# Patient Record
Sex: FEMALE | Race: WHITE | NOT HISPANIC OR LATINO | Employment: FULL TIME | URBAN - NONMETROPOLITAN AREA
[De-identification: names, ages, dates, MRNs, and addresses within clinical notes are randomized per-mention and may not be internally consistent; named-entity substitution may affect disease eponyms.]

---

## 2017-02-08 ENCOUNTER — COMMUNICATION - GICH (OUTPATIENT)
Dept: FAMILY MEDICINE | Facility: OTHER | Age: 37
End: 2017-02-08

## 2017-02-08 DIAGNOSIS — Z30.9 ENCOUNTER FOR CONTRACEPTIVE MANAGEMENT: ICD-10-CM

## 2017-03-05 ENCOUNTER — COMMUNICATION - GICH (OUTPATIENT)
Dept: FAMILY MEDICINE | Facility: OTHER | Age: 37
End: 2017-03-05

## 2017-05-22 ENCOUNTER — OFFICE VISIT - GICH (OUTPATIENT)
Dept: FAMILY MEDICINE | Facility: OTHER | Age: 37
End: 2017-05-22

## 2017-05-22 ENCOUNTER — HISTORY (OUTPATIENT)
Dept: FAMILY MEDICINE | Facility: OTHER | Age: 37
End: 2017-05-22

## 2017-05-22 DIAGNOSIS — L71.0 PERIORAL DERMATITIS: ICD-10-CM

## 2017-05-22 DIAGNOSIS — Z30.09 ENCOUNTER FOR OTHER GENERAL COUNSELING AND ADVICE ON CONTRACEPTION: ICD-10-CM

## 2017-05-22 DIAGNOSIS — L81.1 CHLOASMA: ICD-10-CM

## 2017-06-05 ENCOUNTER — AMBULATORY - GICH (OUTPATIENT)
Dept: SCHEDULING | Facility: OTHER | Age: 37
End: 2017-06-05

## 2017-06-08 ENCOUNTER — AMBULATORY - GICH (OUTPATIENT)
Dept: SCHEDULING | Facility: OTHER | Age: 37
End: 2017-06-08

## 2018-01-03 NOTE — TELEPHONE ENCOUNTER
Patient Information     Patient Name MRN Christine Patel [id number] Female 1980      Telephone Encounter by Erika Bales RN at 3/6/2017  2:16 PM     Author:  Erika Bales RN Service:  (none) Author Type:  (none)     Filed:  3/6/2017  2:32 PM Encounter Date:  3/5/2017 Status:  Signed     :  Erika Bales RN (NURS- Registered Nurse)            Hormones    Office visit in the past 12 months or per provider note.    Last visit with OCHOA MACIAS was on: 2016 in [facility]  Next visit with OCHOA MACIAS is on: No future appointment listed with this provider  Next visit with Family Practice is on: No future appointment listed in this department    Max refill for 12 months from last office visit or per provider note.    Patient's PCP is Austin Kerr MD. Last office visit: 2013.    Patient is due for medication management appointment. Limited refill provided at this time and letter sent for reminder to patient. Prescription refilled per RN Medication Refill Policy.................... Erika Bales ....................  3/6/2017   2:17 PM

## 2018-01-03 NOTE — TELEPHONE ENCOUNTER
Patient Information     Patient Name MRN Christine Patel [id number] Female 1980      Telephone Encounter by Andreina Dunn RN at 2017  9:31 AM     Author:  Andreina Dunn RN Service:  (none) Author Type:  NURS- Registered Nurse     Filed:  2017  9:33 AM Encounter Date:  2017 Status:  Signed     :  Andreina Dunn RN (NURS- Registered Nurse)            Refill request inappropriate. Too soon.Filled 3/29/16 84 x 3 refills. Pharmacy alerted.Unable to complete prescription refill per RN Medication Refill Policy.................... Andreina Dunn RN ....................  2017   9:33 AM    2016  Ordered by: OCHOA MACIAS  Medication:norgestrel-ethinyl estradiol, 0.3-30 mg-mcg, (LO-OVRAL) 0.3-30              mg-mcg tablet    Qty:84 tablet   Ref:3  Start:3/29/2016   End:              Route:Oral                Class:eRx    Sig:Take 1 tablet by mouth once daily.    Pharmacy:[facility] [id number] [place] [address]

## 2018-01-05 NOTE — NURSING NOTE
Patient Information     Patient Name MRN Sex Christine Cheung [id number] Female 1980      Nursing Note by Ventura Santos LPN at 2017  8:45 AM     Author:  Ventura Santos LPN  Service:  (none) Author Type:  NURS- Licensed Practical Nurse     Filed:  2017  8:48 AM  Encounter Date:  2017 Status:  Addendum     :  Ventura Santos LPN (NURS- Licensed Practical Nurse)        Related Notes: Original Note by Ventura Santos LPN (NURS- Licensed Practical Nurse) filed at 2017  8:45 AM            Patient presents to the clinic for questions and options regarding birth control. Patient has been having issues on breaking out and wants to talk about that as well.  Ventura Santos LPN ..............2017 8:45 AM

## 2018-01-05 NOTE — PROGRESS NOTES
Patient Information     Patient Name MRN Christine Patel [id number] Female 1980      Progress Notes by Baylee Lyle MD at 2017  8:45 AM     Author:  Baylee Lyle MD Service:  (none) Author Type:  Physician     Filed:  2017 12:56 PM Encounter Date:  2017 Status:  Signed     :  Baylee Lyle MD (Physician)            SUBJECTIVE:    Christine Moncada is a 37 y.o. female who presents for birth control discussion.  Since her last baby was born, she has a lot of skin issues.  Has red, blothy, itchy area.  Started near the left side of her mouth.  Gets bumpy and itchy, then gets peely.  She also has some in the left nasolabial fold area and now some right under her right lateral lower eyelid.  Also has some redness near her nose.  Mom has rosacea and wonders if this is the cause of this area.  This seems different from the other rash.  They don't seem like pimples.  These are just bumps and are painful and irritated.      Since her last son was born about a year and a half ago, she has also noted some hyperpigmentation of the area of her upper lip.  She has done some reading and knows that this is called melasma. She is wondering if the oral contraceptives she takes have been contributing to this.  She didn't notice this during her pregnancy, only in the months following.    She has had a difficult time tolerating oral contraceptives.  She has done well with Depo-Provera in the past. However, they are thinking they want to have one more child. She did have trouble restarting her periods after having been on Depo-Provera for a prolonged period of time in the past. She is currently on low overall. She had been on a higher dosage oral birth control pill the past.  She is now on lo ovral. In the past, she had problems feeling nauseous with the higher dose of birth-control pills. Now, she's been developing the same feeling of nausea in the mornings with the lo ovral as  well.    HPI  I personally reviewed medications/allergies/history listed below:     Allergies      Allergen   Reactions     Amoxicillin  Rash     Pollen Extracts  Runny Nose     Sneezy, itchy eyes    ,   Family History       Problem   Relation Age of Onset     Other  Mother       due to brother's large size     ,   Current Outpatient Prescriptions on File Prior to Visit       Medication  Sig Dispense Refill     ACETAMINOPHEN (TYLENOL EX STR RAPID RELEASE ORAL) Take 650 mg by mouth.       ibuprofen (ADVIL; MOTRIN) 600 mg tablet Take 1 tablet by mouth every 6 hours if needed for Pain (This medication is first choice for mild pain.). Maximum of 3200 mg in 24 hours.  0     No current facility-administered medications on file prior to visit.    ,   Past Medical History:     Diagnosis  Date     Depression     remote hx      Hx of migraines     abdominal, quiescent      Labral tear of the left hip 2014      (normal spontaneous vaginal delivery)     , VAVD x2      SCOLIOSIS, MILD     in childhood     ,   Patient Active Problem List     Diagnosis  Code     CONTRACEPTIVE MANAGEMENT Z30.09     OTHER MALAISE AND FATIGUE R53.81, R53.83     DEPRESSION, HX OF Z86.59     Dyspareunia N94.1     Labral tear of the left hip M25.9     Positive GBS test B95.1     Normal labor O80, Z37.9    and   Past Surgical History:      Procedure  Laterality Date     left hip labral repair       Social History     Social History        Marital status:       Spouse name: Reyes     Number of children:  2     Years of education:  N/A     Occupational History        [employer]     Social History Main Topics         Smoking status:   Passive Smoke Exposure - Never Smoker     Smokeless tobacco:   Never Used      Comment: In childhood home      Alcohol use   No     Drug use:   No     Sexual activity:   Yes     Partners:  Male     Other Topics  Concern     Not on file      Social History Narrative      ". in August 2005.  Masters Degree from the [employer].  .  Her  his a .    They have 2 sons.     - Reyes    Son - Balwinder    Son - Manpreet            REVIEW OF SYSTEMS:  Review of Systems   All other systems reviewed and are negative.      OBJECTIVE:  /80  Pulse 68  Ht 1.651 m (5' 5\")  Wt 74.4 kg (164 lb)  LMP 04/22/2017  Breastfeeding? No  BMI 27.29 kg/m2    EXAM:   Physical Exam   Constitutional: She is well-developed, well-nourished, and in no distress.   Eyes: Pupils are equal, round, and reactive to light.   Skin:   Result reddened appearance of her upper cheeks with some small papules within this area. There are other clusters of papules around her mouth, chin, nasal labial area. There is also a smaller cluster underneath the lateral side of her right eye. There are also some scattered erythematous papules on her forehead. There are no pustules within knees.    Hyperpigmentation of the area above her upper lip.   Psychiatric: Affect normal.   PHQ Depression Screen  Date of PHQ exam: 05/22/17  Over the last 2 weeks, how often have you been bothered by any of the following problems?  1. Little interest or pleasure in doing things: 0 - Not at all  2. Feeling down, depressed, or hopeless: 0 - Not at all                                           ASSESSMENT/PLAN:    ICD-10-CM    1. Perioral dermatitis L71.0 metroNIDAZOLE (METROCREAM) 0.75 % cream      AMB CONSULT TO DERMATOLOGY   2. Melasma L81.1 AMB CONSULT TO DERMATOLOGY   3. CONTRACEPTIVE MANAGEMENT Z30.09 levonorgestrel-ethinyl estrad, 0.1mg-20mcg, (AVIANE) 0.1-20 mg-mcg tablet        Plan:    1.  I think she has 2 things going on here. I think she does have some rosacea developing over her cheeks. She feels that this is different than the other rash she has. I think the other rash is probably perioral dermatitis. She states that she has tried metronidazole " cream in the past which she didn't really think was all that effective. We discussed that usually the next step would be an oral antibiotic, which she does not want to do at this time. We'll have her try the metronidazole 0.75% cream again at this time. Also will refer to dermatology so that if the metronidazole cream is not effective they might be able to discuss other options for treatment.  2. We'll try switching oral antibiotic to a lower estrogen dosage to see if this will help reduce the melasma she has at all. She already is trying to avoid sun exposure. Further discussion with dermatology as well.  3. As above, will decrease the dosage of her oral contraceptive and switched to Aviane.  Follow-up as needed.  Baylee Lyle MD

## 2018-01-27 VITALS
WEIGHT: 164 LBS | HEIGHT: 65 IN | DIASTOLIC BLOOD PRESSURE: 80 MMHG | HEART RATE: 68 BPM | BODY MASS INDEX: 27.32 KG/M2 | SYSTOLIC BLOOD PRESSURE: 122 MMHG

## 2018-01-31 ENCOUNTER — DOCUMENTATION ONLY (OUTPATIENT)
Dept: FAMILY MEDICINE | Facility: OTHER | Age: 38
End: 2018-01-31

## 2018-01-31 PROBLEM — Z86.59 PERSONAL HISTORY OF OTHER MENTAL DISORDER: Status: ACTIVE | Noted: 2018-01-31

## 2018-01-31 RX ORDER — ACETAMINOPHEN 500 MG
650 TABLET ORAL
COMMUNITY
End: 2018-06-05

## 2018-01-31 RX ORDER — LEVONORGESTREL/ETHIN.ESTRADIOL 0.1-0.02MG
1 TABLET ORAL DAILY
COMMUNITY
Start: 2017-05-22 | End: 2018-03-07

## 2018-01-31 RX ORDER — IBUPROFEN 600 MG/1
600 TABLET, FILM COATED ORAL EVERY 6 HOURS PRN
COMMUNITY
Start: 2016-02-08 | End: 2018-03-07

## 2018-03-07 ENCOUNTER — OFFICE VISIT (OUTPATIENT)
Dept: FAMILY MEDICINE | Facility: OTHER | Age: 38
End: 2018-03-07
Attending: FAMILY MEDICINE
Payer: COMMERCIAL

## 2018-03-07 VITALS
HEART RATE: 72 BPM | SYSTOLIC BLOOD PRESSURE: 100 MMHG | WEIGHT: 157.8 LBS | DIASTOLIC BLOOD PRESSURE: 70 MMHG | TEMPERATURE: 97.2 F | BODY MASS INDEX: 26.26 KG/M2

## 2018-03-07 DIAGNOSIS — N91.2 AMENORRHEA: Primary | ICD-10-CM

## 2018-03-07 LAB
B-HCG SERPL-ACNC: NORMAL IU/L
HCG UR QL: POSITIVE

## 2018-03-07 PROCEDURE — 81025 URINE PREGNANCY TEST: CPT | Performed by: NURSE PRACTITIONER

## 2018-03-07 PROCEDURE — 99213 OFFICE O/P EST LOW 20 MIN: CPT | Performed by: NURSE PRACTITIONER

## 2018-03-07 PROCEDURE — 84702 CHORIONIC GONADOTROPIN TEST: CPT | Performed by: NURSE PRACTITIONER

## 2018-03-07 PROCEDURE — 36415 COLL VENOUS BLD VENIPUNCTURE: CPT | Performed by: NURSE PRACTITIONER

## 2018-03-07 ASSESSMENT — PAIN SCALES - GENERAL: PAINLEVEL: NO PAIN (0)

## 2018-03-07 NOTE — NURSING NOTE
Patient is here today and would like a pregnancy test, she thinks she may be about 5 weeks along.Tessy Laurent LPN......................3/7/2018 3:53 PM

## 2018-03-07 NOTE — MR AVS SNAPSHOT
"              After Visit Summary   3/7/2018    Damari Moncada    MRN: [id number]           Patient Information     Date Of Birth          1980        Visit Information        Provider Department      3/7/2018 3:45 PM Loulou Nelson APRN CNP [facility]        Today's Diagnoses     Amenorrhea    -  1       Follow-ups after your visit        Additional Services     OB/GYN REFERRAL       Dr Trinh                  Future tests that were ordered for you today     Open Future Orders        Priority Expected Expires Ordered    US Pelvic Complete with Transvaginal Routine  3/7/2019 3/7/2018    OB/GYN REFERRAL Routine  3/29/2018 3/7/2018            Who to contact     If you have questions or need follow up information about today's clinic visit or your schedule please contact [facility] directly at [phone].  Normal or non-critical lab and imaging results will be communicated to you by Orthogemhart, letter or phone within 4 business days after the clinic has received the results. If you do not hear from us within 7 days, please contact the clinic through Orthogemhart or phone. If you have a critical or abnormal lab result, we will notify you by phone as soon as possible.  Submit refill requests through Leapfactor or call your pharmacy and they will forward the refill request to us. Please allow 3 business days for your refill to be completed.          Additional Information About Your Visit        MyChart Information     Leapfactor lets you send messages to your doctor, view your test results, renew your prescriptions, schedule appointments and more. To sign up, go to [email or web address] . Click on \"Log in\" on the left side of the screen, which will take you to the Welcome page. Then click on \"Sign up Now\" on the right side of the page.     You will be asked to enter the access code listed below, as well as some personal information. Please follow the directions to create your " username and password.     Your access code is: [email or web address]  Expires: 2018  4:58 PM     Your access code will  in 90 days. If you need help or a new code, please call your [place] clinic or [phone].        Care EveryWhere ID     This is your Care EveryWhere ID. This could be used by other organizations to access your [place] medical records  [id number]        Your Vitals Were     Pulse Temperature Last Period Breastfeeding? BMI (Body Mass Index)       72 97.2  F (36.2  C) (Temporal) 2018 (LMP Unknown) No 26.26 kg/m2        Blood Pressure from Last 3 Encounters:   18 100/70   17 122/80   16 110/76    Weight from Last 3 Encounters:   18 71.6 kg (157 lb 12.8 oz)   17 74.4 kg (164 lb)   16 86.1 kg (189 lb 12.8 oz)              We Performed the Following     HCG Qual, Urine - CSC and Range ([id number])     HCG quantitative pregnancy          Today's Medication Changes          These changes are accurate as of 3/7/18  4:58 PM.  If you have any questions, ask your nurse or doctor.               Start taking these medicines.        Dose/Directions    Prenatal Vitamins 0.8 MG Tabs   Used for:  Amenorrhea   Started by:  Loulou Nelson APRN CNP        1 tab daily   Quantity:  90 tablet   Refills:  3            Where to get your medicines      Some of these will need a paper prescription and others can be bought over the counter.  Ask your nurse if you have questions.     Bring a paper prescription for each of these medications     Prenatal Vitamins 0.8 MG Tabs                Primary Care Provider Office Phone # Fax #    Baylee Fadia Lyle [phone] [address] [facility] [address]        Equal Access to Services     [facility] EMMA : Fabian medelo Sopaulo, waaxda luqadaha, qaybta kaalmada ghadada, gaurang carbajal. So [facility] [phone].    ATENCIÓN: Si habla español, tiene a judge disposición  servicios gratuitos de asistencia lingüística. Cory snider [phone].    We comply with applicable federal civil rights laws and [place] laws. We do not discriminate on the basis of race, color, national origin, age, disability, sex, sexual orientation, or gender identity.            Thank you!     Thank you for choosing [facility]  for your care. Our goal is always to provide you with excellent care. Hearing back from our patients is one way we can continue to improve our services. Please take a few minutes to complete the written survey that you may receive in the mail after your visit with us. Thank you!             Your Updated Medication List - Protect others around you: Learn how to safely use, store and throw away your medicines at www.disposemymeds.org.          This list is accurate as of 3/7/18  4:58 PM.  Always use your most recent med list.                   Brand Name Dispense Instructions for use Diagnosis    acetaminophen 500 MG tablet    TYLENOL     Take 650 mg by mouth        metroNIDAZOLE 0.75 % cream    METROCREAM          Prenatal Vitamins 0.8 MG Tabs     90 tablet    1 tab daily    Amenorrhea

## 2018-03-08 ASSESSMENT — PATIENT HEALTH QUESTIONNAIRE - PHQ9: SUM OF ALL RESPONSES TO PHQ QUESTIONS 1-9: 0

## 2018-03-08 NOTE — PROGRESS NOTES
"SUBJECTIVE:   Damari Moncada is a 38 year old female presenting with a chief complaint of   Chief Complaint   Patient presents with     Pregnancy Test     would like a pregnancy test   Patient had a positive home pregnancy test--- has been taking oral contraceptives and reports has never missed a dose--  Reports previous pregnancy on oral contraceptives.  Feels had a\" breakthrough period\" before 18 which was her last menstrual period date, reports has had previous monthly menstrual periods on oral contraceptive.  Feels she may be about 5 or 6 weeks duration but not really sure.  Wants to see Dr. Trinh for pregnancy, had complications with her last delivery.  Reports overall she has been feeling well, denies any nausea, vomiting, fevers or congestion  No tobacco use--wants to start prenatal vitamin            Review of Systems   Genitourinary: Positive for menstrual problem.   All other systems reviewed and are negative.      Past Medical History:   Diagnosis Date     Encounter for full-term uncomplicated delivery     , VAVD x2     Joint disorder     2014     Major depressive disorder, single episode     remote hx     Other idiopathic scoliosis, site unspecified     in childhood     Personal history of other diseases of the nervous system and sense organs     2001,abdominal, quiescent     Family History   Problem Relation Age of Onset     Other - See Comments Mother       due to brother's large size     Current Outpatient Prescriptions   Medication Sig Dispense Refill     Prenatal Multivit-Min-Fe-FA (PRENATAL VITAMINS) 0.8 MG TABS 1 tab daily 90 tablet 3     acetaminophen (TYLENOL) 500 MG tablet Take 650 mg by mouth       metroNIDAZOLE (METROCREAM) 0.75 % cream        Social History   Substance Use Topics     Smoking status: Passive Smoke Exposure - Never Smoker     Smokeless tobacco: Never Used      Comment: Quit smoking: In childhood home     Alcohol use No       OBJECTIVE  /70  " Pulse 72  Temp 97.2  F (36.2  C) (Temporal)  Wt 157 lb 12.8 oz (71.6 kg)  LMP 01/28/2018 (LMP Unknown)  Breastfeeding? No  BMI 26.26 kg/m2    Physical Exam   Constitutional: She is oriented to person, place, and time. She appears well-developed and well-nourished.   Cardiovascular: Normal rate.    Pulmonary/Chest: Effort normal.   Musculoskeletal: Normal range of motion.   Neurological: She is alert and oriented to person, place, and time.   Skin: Skin is warm and dry.   Psychiatric: She has a normal mood and affect. Her behavior is normal. Judgment and thought content normal.   Nursing note and vitals reviewed.      Labs:  Results for orders placed or performed in visit on 03/07/18 (from the past 24 hour(s))   HCG Qual, Urine - CSC and Range ([id number])   Result Value Ref Range    HCG Qual Urine Positive (A) NEG^Negative   HCG quantitative pregnancy   Result Value Ref Range    HCG Quantitative Serum 68518 IU/L           ASSESSMENT:      ICD-10-CM    1. Amenorrhea N91.2 HCG Qual, Urine - CSC and Range ([id number])     HCG quantitative pregnancy     US Pelvic Complete with Transvaginal     OB/GYN REFERRAL     Prenatal Multivit-Min-Fe-FA (PRENATAL VITAMINS) 0.8 MG TABS   Possibly 5 weeks gestation ?    Pelvic ultrasound scheduled    PLAN: Prenatal consultation with Dr. Trinh after ultrasound and labs completed      Followup: Ultrasound and gynecology consultation as scheduled

## 2018-03-19 ENCOUNTER — HOSPITAL ENCOUNTER (OUTPATIENT)
Dept: ULTRASOUND IMAGING | Facility: OTHER | Age: 38
Discharge: HOME | End: 2018-03-19
Attending: NURSE PRACTITIONER | Admitting: NURSE PRACTITIONER
Payer: COMMERCIAL

## 2018-03-19 DIAGNOSIS — N91.2 AMENORRHEA: ICD-10-CM

## 2018-03-19 PROCEDURE — 76801 OB US < 14 WKS SINGLE FETUS: CPT

## 2018-04-06 ENCOUNTER — PRENATAL OFFICE VISIT (OUTPATIENT)
Dept: OBGYN | Facility: OTHER | Age: 38
End: 2018-04-06
Attending: OBSTETRICS & GYNECOLOGY
Payer: COMMERCIAL

## 2018-04-06 VITALS
SYSTOLIC BLOOD PRESSURE: 112 MMHG | BODY MASS INDEX: 26.65 KG/M2 | DIASTOLIC BLOOD PRESSURE: 76 MMHG | HEIGHT: 66 IN | WEIGHT: 165.8 LBS | HEART RATE: 86 BPM

## 2018-04-06 DIAGNOSIS — I49.9 CARDIAC ARRHYTHMIA, UNSPECIFIED CARDIAC ARRHYTHMIA TYPE: ICD-10-CM

## 2018-04-06 DIAGNOSIS — Z34.91 NORMAL PREGNANCY IN FIRST TRIMESTER: Primary | ICD-10-CM

## 2018-04-06 DIAGNOSIS — O09.521 ELDERLY MULTIGRAVIDA IN FIRST TRIMESTER: ICD-10-CM

## 2018-04-06 LAB
ALBUMIN UR-MCNC: NEGATIVE MG/DL
ANION GAP SERPL CALCULATED.3IONS-SCNC: 7 MMOL/L (ref 3–14)
APPEARANCE UR: CLEAR
BASOPHILS # BLD AUTO: 0 10E9/L (ref 0–0.2)
BASOPHILS NFR BLD AUTO: 0.4 %
BILIRUB UR QL STRIP: NEGATIVE
BUN SERPL-MCNC: 15 MG/DL (ref 7–25)
C TRACH DNA SPEC QL PROBE+SIG AMP: NOT DETECTED
CALCIUM SERPL-MCNC: 9 MG/DL (ref 8.6–10.3)
CHLORIDE SERPL-SCNC: 101 MMOL/L (ref 98–107)
CO2 SERPL-SCNC: 26 MMOL/L (ref 21–31)
COLOR UR AUTO: YELLOW
CREAT SERPL-MCNC: 0.68 MG/DL (ref 0.6–1.2)
DIFFERENTIAL METHOD BLD: NORMAL
EOSINOPHIL # BLD AUTO: 0 10E9/L (ref 0–0.7)
EOSINOPHIL NFR BLD AUTO: 0.5 %
ERYTHROCYTE [DISTWIDTH] IN BLOOD BY AUTOMATED COUNT: 12.7 % (ref 10–15)
GFR SERPL CREATININE-BSD FRML MDRD: >90 ML/MIN/1.7M2
GLUCOSE SERPL-MCNC: 74 MG/DL (ref 70–105)
GLUCOSE UR STRIP-MCNC: NEGATIVE MG/DL
HCT VFR BLD AUTO: 37.4 % (ref 35–47)
HGB BLD-MCNC: 12.6 G/DL (ref 11.7–15.7)
HGB UR QL STRIP: NEGATIVE
IMM GRANULOCYTES # BLD: 0 10E9/L (ref 0–0.4)
IMM GRANULOCYTES NFR BLD: 0.2 %
KETONES UR STRIP-MCNC: ABNORMAL MG/DL
LEUKOCYTE ESTERASE UR QL STRIP: NEGATIVE
LYMPHOCYTES # BLD AUTO: 2.1 10E9/L (ref 0.8–5.3)
LYMPHOCYTES NFR BLD AUTO: 25.1 %
MAGNESIUM SERPL-MCNC: 2 MG/DL (ref 1.9–2.7)
MCH RBC QN AUTO: 29.6 PG (ref 26.5–33)
MCHC RBC AUTO-ENTMCNC: 33.7 G/DL (ref 31.5–36.5)
MCV RBC AUTO: 88 FL (ref 78–100)
MONOCYTES # BLD AUTO: 0.5 10E9/L (ref 0–1.3)
MONOCYTES NFR BLD AUTO: 6.5 %
N GONORRHOEA DNA SPEC QL PROBE+SIG AMP: NOT DETECTED
NEUTROPHILS # BLD AUTO: 5.5 10E9/L (ref 1.6–8.3)
NEUTROPHILS NFR BLD AUTO: 67.3 %
NITRATE UR QL: NEGATIVE
PH UR STRIP: 6.5 PH (ref 5–7)
PLATELET # BLD AUTO: 302 10E9/L (ref 150–450)
POTASSIUM SERPL-SCNC: 3.5 MMOL/L (ref 3.5–5.1)
RBC # BLD AUTO: 4.25 10E12/L (ref 3.8–5.2)
SODIUM SERPL-SCNC: 134 MMOL/L (ref 134–144)
SOURCE: ABNORMAL
SP GR UR STRIP: 1.01 (ref 1–1.03)
SPECIMEN SOURCE: NORMAL
TSH SERPL DL<=0.05 MIU/L-ACNC: 0.6 IU/ML (ref 0.34–5.6)
UROBILINOGEN UR STRIP-ACNC: 0.2 EU/DL (ref 0.2–1)
WBC # BLD AUTO: 8.2 10E9/L (ref 4–11)

## 2018-04-06 PROCEDURE — 81003 URINALYSIS AUTO W/O SCOPE: CPT | Performed by: OBSTETRICS & GYNECOLOGY

## 2018-04-06 PROCEDURE — 36415 COLL VENOUS BLD VENIPUNCTURE: CPT | Performed by: OBSTETRICS & GYNECOLOGY

## 2018-04-06 PROCEDURE — 83735 ASSAY OF MAGNESIUM: CPT | Performed by: OBSTETRICS & GYNECOLOGY

## 2018-04-06 PROCEDURE — 85025 COMPLETE CBC W/AUTO DIFF WBC: CPT | Performed by: OBSTETRICS & GYNECOLOGY

## 2018-04-06 PROCEDURE — 99207 ZZC OB VISIT-NO CHARGE - GICH ONLY: CPT | Performed by: OBSTETRICS & GYNECOLOGY

## 2018-04-06 PROCEDURE — 87389 HIV-1 AG W/HIV-1&-2 AB AG IA: CPT | Performed by: OBSTETRICS & GYNECOLOGY

## 2018-04-06 PROCEDURE — 93000 ELECTROCARDIOGRAM COMPLETE: CPT | Performed by: INTERNAL MEDICINE

## 2018-04-06 PROCEDURE — 86901 BLOOD TYPING SEROLOGIC RH(D): CPT | Performed by: OBSTETRICS & GYNECOLOGY

## 2018-04-06 PROCEDURE — 86762 RUBELLA ANTIBODY: CPT | Performed by: OBSTETRICS & GYNECOLOGY

## 2018-04-06 PROCEDURE — 86900 BLOOD TYPING SEROLOGIC ABO: CPT | Performed by: OBSTETRICS & GYNECOLOGY

## 2018-04-06 PROCEDURE — 87591 N.GONORRHOEAE DNA AMP PROB: CPT | Performed by: OBSTETRICS & GYNECOLOGY

## 2018-04-06 PROCEDURE — 87086 URINE CULTURE/COLONY COUNT: CPT | Performed by: OBSTETRICS & GYNECOLOGY

## 2018-04-06 PROCEDURE — 80048 BASIC METABOLIC PNL TOTAL CA: CPT | Performed by: OBSTETRICS & GYNECOLOGY

## 2018-04-06 PROCEDURE — 86850 RBC ANTIBODY SCREEN: CPT | Performed by: OBSTETRICS & GYNECOLOGY

## 2018-04-06 PROCEDURE — 86780 TREPONEMA PALLIDUM: CPT | Performed by: OBSTETRICS & GYNECOLOGY

## 2018-04-06 PROCEDURE — 87491 CHLMYD TRACH DNA AMP PROBE: CPT | Performed by: OBSTETRICS & GYNECOLOGY

## 2018-04-06 PROCEDURE — 87340 HEPATITIS B SURFACE AG IA: CPT | Performed by: OBSTETRICS & GYNECOLOGY

## 2018-04-06 PROCEDURE — 84443 ASSAY THYROID STIM HORMONE: CPT | Performed by: OBSTETRICS & GYNECOLOGY

## 2018-04-06 ASSESSMENT — PAIN SCALES - GENERAL: PAINLEVEL: NO PAIN (0)

## 2018-04-06 NOTE — PROGRESS NOTES
CC: New OB visit  HPI:  Damari Moncada is  at Unknown based on No LMP recorded (lmp unknown). Patient is pregnant./first trimester US 3/19/18 at 8 weeks 0 days with EDC of 10/29/18.  She notes issues of nausea, sore breasts, fatigue. She exercises regularly. She has history of labrum surgery in her hip without hardware. She has history of a seemingly benign arrhythmia.    Obstetric History       T2      L2     SAB0   TAB0   Ectopic0   Multiple0   Live Births0       # Outcome Date GA Lbr Yao/2nd Weight Sex Delivery Anes PTL Lv   1 Current                 STI: (denies HSV, Hep C, Hep B, HIV, Syphilis, Chlamydia, Gonorrhea)  Last pap smear: Normal and up to date.  Chickenpox history: has had the chicken pox.  Past Medical History:   Diagnosis Date     Encounter for full-term uncomplicated delivery     , VAVD x2     Joint disorder     2014     Major depressive disorder, single episode     remote hx     Other idiopathic scoliosis, site unspecified     in childhood     Personal history of other diseases of the nervous system and sense organs     ,abdominal, quiescent      has a past surgical history that includes other surgical history.    Social History   Substance Use Topics     Smoking status: Passive Smoke Exposure - Never Smoker     Smokeless tobacco: Never Used      Comment: Quit smoking: In childhood home     Alcohol use No     Family History   Problem Relation Age of Onset     Other - See Comments Mother       due to brother's large size         Current Outpatient Prescriptions   Medication     Prenatal Multivit-Min-Fe-FA (PRENATAL VITAMINS) 0.8 MG TABS     acetaminophen (TYLENOL) 500 MG tablet     metroNIDAZOLE (METROCREAM) 0.75 % cream     No current facility-administered medications for this visit.      Allergies   Allergen Reactions     Pollen Extract      Other reaction(s): Runny Nose  Sneezy, itchy eyes     Amoxicillin Rash     Immunization History  "  Administered Date(s) Administered     W4s3-19 Novel Flu 11/05/2009           REVIEW OF SYSTEMS  General: negative  ENT: negative  Resp: No shortness of breath, dyspnea on exertion, cough, or hemoptysis  GI: nausea  : negative  Musculoskeletal: negative  Psychiatric: feeling anxious, hormonal  Hematologic: negative, postpartum hemorrhage    EXAM: /76 (BP Location: Right arm, Patient Position: Chair, Cuff Size: Adult Regular)  Pulse 86  Ht 1.676 m (5' 6\")  Wt 75.2 kg (165 lb 12.8 oz)  LMP  (LMP Unknown)  Breastfeeding? No  BMI 26.76 kg/m2  Gen: NAD  CV: rhythm is irregularly irregular, slow rate, about 60  Resp: CTA Bilaterally  Breasts: normal without suspicious masses, skin changes or axillary nodes.  Abdomen: NT, ND  Pelvic exam: normal vagina and vulva, normal cervix without lesions or tenderness, exam chaperoned by nurse. G/C collected.  Extremities: No TTP, no deformity  Neuro: CN II-XII intact grossly, moves all extremities  Psych: normal affect and mentation.  Bedside US shows a viable IUP at 10w4d with normal heart rate, fluid, normal ovaries and uterus.    I/P    (O09.521) Elderly multigravida in first trimester  Comment:   Plan: CBC with platelets differential- OB PANEL, ABO         and Rh- OB PANEL, Anti Treponema- OB PANEL,         Antibody screen red cell- OB PANEL, Hepatitis B        surface antigen OB PANEL, Rubella Antibody IgG         Quantitative- OB PANEL, Urine Culture Aerobic         Bacterial- OB PANEL, *UA reflex to Microscopic-        OB PANEL, GC/Chlamydia by PCR - HI,GH, HIV         Antigen Antibody Combo            (I49.9) Cardiac arrhythmia, unspecified cardiac arrhythmia type  Comment:   Plan: EKG 12-lead, tracing only, TSH GH, Basic         metabolic panel  (Ca, Cl, CO2, Creat, Gluc, K,         Na, BUN)          Discussed safety, nutrition, screening for cystic fibrosis, spina bifida, spinal muscular atrophy, quad screen, cffDNA screening as appropriate.  F/U scheduled, " discussed call schedule rotation with [facility] and Dr. Sanchez, general surgery.  Return visit in 1 month, pending EKG.    Pregnancy risk factors include: AMA, History of PPH, vacuum delivery x 2, cardiac dysrhythmia.    Bertrand Johnson MD FACOG  4:06 PM 4/6/2018

## 2018-04-06 NOTE — MR AVS SNAPSHOT
"              After Visit Summary   4/6/2018    Damari Moncada    MRN: [id number]           Patient Information     Date Of Birth          1980        Visit Information        Provider Department      4/6/2018 3:45 PM Bertrand Johnson MD [facility]        Today's Diagnoses     Normal pregnancy in first trimester    -  1    Elderly multigravida in first trimester        Cardiac arrhythmia, unspecified cardiac arrhythmia type          Care Instructions    Unisom at night, vitamin B6 25 mg three times a day for nausea and sleep.  Prenatal vitamins, extra vitamin D.            Follow-ups after your visit        Follow-up notes from your care team     Return in about 4 weeks (around 5/4/2018).      Who to contact     If you have questions or need follow up information about today's clinic visit or your schedule please contact [facility] directly at [phone].  Normal or non-critical lab and imaging results will be communicated to you by Simplisthart, letter or phone within 4 business days after the clinic has received the results. If you do not hear from us within 7 days, please contact the clinic through Simplisthart or phone. If you have a critical or abnormal lab result, we will notify you by phone as soon as possible.  Submit refill requests through [employer] or call your pharmacy and they will forward the refill request to us. Please allow 3 business days for your refill to be completed.          Additional Information About Your Visit        MyChart Information     [employer] lets you send messages to your doctor, view your test results, renew your prescriptions, schedule appointments and more. To sign up, go to [email or web address] . Click on \"Log in\" on the left side of the screen, which will take you to the Welcome page. Then click on \"Sign up Now\" on the right side of the page.     You will be asked to enter the access code listed below, as well as some personal " "information. Please follow the directions to create your username and password.     Your access code is: [email or web address]  Expires: 2018  5:58 PM     Your access code will  in 90 days. If you need help or a new code, please call your [place] clinic or [phone].        Care EveryWhere ID     This is your Care EveryWhere ID. This could be used by other organizations to access your [place] medical records  [id number]        Your Vitals Were     Pulse Height Last Period Breastfeeding? BMI (Body Mass Index)       86 1.676 m (5' 6\") (LMP Unknown) No 26.76 kg/m2        Blood Pressure from Last 3 Encounters:   18 112/76   18 100/70   17 122/80    Weight from Last 3 Encounters:   18 75.2 kg (165 lb 12.8 oz)   18 71.6 kg (157 lb 12.8 oz)   17 74.4 kg (164 lb)              We Performed the Following     *UA reflex to Microscopic- OB PANEL     ABO and Rh- OB PANEL     Anti Treponema- OB PANEL     Antibody screen red cell- OB PANEL     Basic metabolic panel  (Ca, Cl, CO2, Creat, Gluc, K, Na, BUN)     CBC with platelets differential- OB PANEL     EKG 12-lead, tracing only     GC/Chlamydia by PCR - HI,GH     Hepatitis B surface antigen OB PANEL     HIV Antigen Antibody Combo     Magnesium     Rubella Antibody IgG Quantitative- OB PANEL     TSH GH     Urine Culture Aerobic Bacterial- OB PANEL        Primary Care Provider Office Phone # Fax #    Sxcy Fadia Lyle [phone]       [address] [facility] [address]        Equal Access to Services     [facility] : Hadii milton Warner, waaxda luqadaha, qaybta kaalgaurang greene. So [facility] [phone].    ATENCIÓN: Si habla español, tiene a judge disposición servicios gratuitos de asistencia lingüística. Llame al [phone].    We comply with applicable federal civil rights laws and [place] laws. We do not discriminate on the basis of race, color, " national origin, age, disability, sex, sexual orientation, or gender identity.            Thank you!     Thank you for choosing [facility] AND \[facility]\""  for your care. Our goal is always to provide you with excellent care. Hearing back from our patients is one way we can continue to improve our services. Please take a few minutes to complete the written survey that you may receive in the mail after your visit with us. Thank you!             Your Updated Medication List - Protect others around you: Learn how to safely use, store and throw away your medicines at www.disposemymeds.org.          This list is accurate as of 4/6/18  5:02 PM.  Always use your most recent med list.                   Brand Name Dispense Instructions for use Diagnosis    acetaminophen 500 MG tablet    TYLENOL     Take 650 mg by mouth        metroNIDAZOLE 0.75 % cream    METROCREAM          Prenatal Vitamins 0.8 MG Tabs     90 tablet    1 tab daily    Amenorrhea

## 2018-04-06 NOTE — PATIENT INSTRUCTIONS
Unisom at night, vitamin B6 25 mg three times a day for nausea and sleep.  Prenatal vitamins, extra vitamin D.

## 2018-04-07 LAB
ABO + RH BLD: NORMAL
ABO + RH BLD: NORMAL
BLD GP AB SCN SERPL QL: NORMAL
SPECIMEN EXP DATE BLD: NORMAL

## 2018-04-08 LAB
BACTERIA SPEC CULT: NORMAL
SPECIMEN SOURCE: NORMAL

## 2018-04-10 ENCOUNTER — TELEPHONE (OUTPATIENT)
Dept: OBGYN | Facility: OTHER | Age: 38
End: 2018-04-10

## 2018-04-10 LAB
HBV SURFACE AG SERPL QL IA: NONREACTIVE
HIV 1+2 AB+HIV1 P24 AG SERPL QL IA: NONREACTIVE
RUBV IGG SERPL IA-ACNC: 83 IU/ML
T PALLIDUM IGG+IGM SER QL: NEGATIVE

## 2018-05-08 ENCOUNTER — PRENATAL OFFICE VISIT (OUTPATIENT)
Dept: OBGYN | Facility: OTHER | Age: 38
End: 2018-05-08
Attending: OBSTETRICS & GYNECOLOGY
Payer: COMMERCIAL

## 2018-05-08 VITALS
SYSTOLIC BLOOD PRESSURE: 110 MMHG | DIASTOLIC BLOOD PRESSURE: 80 MMHG | HEART RATE: 80 BPM | BODY MASS INDEX: 27.43 KG/M2 | HEIGHT: 66 IN | WEIGHT: 170.7 LBS

## 2018-05-08 DIAGNOSIS — O09.522 ELDERLY MULTIGRAVIDA IN SECOND TRIMESTER: Primary | ICD-10-CM

## 2018-05-08 PROCEDURE — 99207 ZZC OB VISIT-NO CHARGE - GICH ONLY: CPT | Performed by: OBSTETRICS & GYNECOLOGY

## 2018-05-08 ASSESSMENT — PAIN SCALES - GENERAL: PAINLEVEL: NO PAIN (0)

## 2018-05-08 ASSESSMENT — ANXIETY QUESTIONNAIRES
2. NOT BEING ABLE TO STOP OR CONTROL WORRYING: NOT AT ALL
1. FEELING NERVOUS, ANXIOUS, OR ON EDGE: NOT AT ALL

## 2018-05-08 NOTE — MR AVS SNAPSHOT
After Visit Summary   5/8/2018    Christine Moncada    MRN: [id number]           Patient Information     Date Of Birth          1980        Visit Information        Provider Department      5/8/2018 3:45 PM Bertrand Johnson MD [facility]        Today's Diagnoses     Elderly multigravida in second trimester    -  1       Follow-ups after your visit        Your next 10 appointments already scheduled     Jun 05, 2018  1:00 PM CDT   (Arrive by 12:45 PM)   US OB > 14 WEEKS COMPLETE SINGLE with GHUS2   [facility]    [address] [facility] [id number] [phone]           Please bring a list of your medicines (including vitamins, minerals and over-the-counter drugs). Also, tell your doctor about any allergies you may have. Wear comfortable clothes and leave your valuables at home.  If you re less than 20 weeks drink four 8-ounce glasses of fluid an hour before your exam. If you need to empty your bladder before your exam, try to release only a little urine. Then, drink another glass of fluid.  You may have up to two family members in the exam room. If you bring a small child, an adult must be there to care for him or her.  Please call the Imaging Department at your exam site with any questions.            Jun 05, 2018  2:00 PM CDT   ESTABLISHED PRENATAL with Bertrand Johnson MD   [facility]    [address] [facility] [id number] [phone]              Future tests that were ordered for you today     Open Future Orders        Priority Expected Expires Ordered    US OB > 14 Weeks Complete Single Routine  5/8/2019 5/8/2018            Who to contact     If you have questions or need follow up information about today's clinic visit or your schedule please contact [facility] directly at [phone].  Normal or non-critical  "lab and imaging results will be communicated to you by MyChart, letter or phone within 4 business days after the clinic has received the results. If you do not hear from us within 7 days, please contact the clinic through [employer] or phone. If you have a critical or abnormal lab result, we will notify you by phone as soon as possible.  Submit refill requests through [employer] or call your pharmacy and they will forward the refill request to us. Please allow 3 business days for your refill to be completed.          Additional Information About Your Visit        [employer] Information     [employer] lets you send messages to your doctor, view your test results, renew your prescriptions, schedule appointments and more. To sign up, go to [email or web address] . Click on \"Log in\" on the left side of the screen, which will take you to the Welcome page. Then click on \"Sign up Now\" on the right side of the page.     You will be asked to enter the access code listed below, as well as some personal information. Please follow the directions to create your username and password.     Your access code is: [email or web address]  Expires: 2018  5:58 PM     Your access code will  in 90 days. If you need help or a new code, please call your [place] clinic or [phone].        Care EveryWhere ID     This is your Care EveryWhere ID. This could be used by other organizations to access your [place] medical records  [id number]        Your Vitals Were     Pulse Height Last Period BMI (Body Mass Index)          80 1.676 m (5' 6\") (LMP Unknown) 27.55 kg/m2         Blood Pressure from Last 3 Encounters:   18 110/80   18 112/76   18 100/70    Weight from Last 3 Encounters:   18 77.4 kg (170 lb 11.2 oz)   18 75.2 kg (165 lb 12.8 oz)   18 71.6 kg (157 lb 12.8 oz)               Primary Care Provider Office Phone # Fax #    Baylee Fadia Lyle [phone]       [address]  [place] [facility] [place] [address]        Equal Access to Services     [facility] : Hadii aad ku hadjosephsoha Ravinderali, wadonstaci dudleynellyha, lillyramses chiangraegaurang aviles. So [facility] [phone].    ATENCIÓN: Si habla español, tiene a judge disposición servicios gratuitos de asistencia lingüística. Llame al [phone].    We comply with applicable federal civil rights laws and [place] laws. We do not discriminate on the basis of race, color, national origin, age, disability, sex, sexual orientation, or gender identity.            Thank you!     Thank you for choosing [facility] AND [facility]  for your care. Our goal is always to provide you with excellent care. Hearing back from our patients is one way we can continue to improve our services. Please take a few minutes to complete the written survey that you may receive in the mail after your visit with us. Thank you!             Your Updated Medication List - Protect others around you: Learn how to safely use, store and throw away your medicines at www.disposemymeds.org.          This list is accurate as of 5/8/18  4:29 PM.  Always use your most recent med list.                   Brand Name Dispense Instructions for use Diagnosis    acetaminophen 500 MG tablet    TYLENOL     Take 650 mg by mouth        metroNIDAZOLE 0.75 % cream    METROCREAM          Prenatal Vitamins 0.8 MG Tabs     90 tablet    1 tab daily    Amenorrhea

## 2018-05-08 NOTE — PROGRESS NOTES
"  CC: Recheck OB visit at 15w1d    HPI: Christine Moncada presents for a routine OB visit now at 15w1d  She has nausea but is gaining weight.  Denies cramping, bleeding.    Obstetric History       T2      L2     SAB1   TAB0   Ectopic0   Multiple0   Live Births2       # Outcome Date GA Lbr Yao/2nd Weight Sex Delivery Anes PTL Lv   4 Current            3 SAB            2 Term     M Vag-Vacuum   ANGELI   1 Term     M Vag-Vacuum   ANGELI      Complications: Excessive Vaginal Bleeding        Current Outpatient Prescriptions   Medication     acetaminophen (TYLENOL) 500 MG tablet     metroNIDAZOLE (METROCREAM) 0.75 % cream     Prenatal Multivit-Min-Fe-FA (PRENATAL VITAMINS) 0.8 MG TABS     No current facility-administered medications for this visit.      O: /80 (BP Location: Right arm, Patient Position: Sitting, Cuff Size: Adult Large)  Pulse 80  Ht 1.676 m (5' 6\")  Wt 77.4 kg (170 lb 11.2 oz)  LMP  (LMP Unknown)  BMI 27.55 kg/m2  Body mass index is 27.55 kg/(m^2).  See OB flow sheet  EXAM:  NAD      Results for orders placed or performed in visit on 18   CBC with platelets differential- OB PANEL   Result Value Ref Range    WBC 8.2 4.0 - 11.0 10e9/L    RBC Count 4.25 3.8 - 5.2 10e12/L    Hemoglobin 12.6 11.7 - 15.7 g/dL    Hematocrit 37.4 35.0 - 47.0 %    MCV 88 78 - 100 fl    MCH 29.6 26.5 - 33.0 pg    MCHC 33.7 31.5 - 36.5 g/dL    RDW 12.7 10.0 - 15.0 %    Platelet Count 302 150 - 450 10e9/L    Diff Method Automated Method     % Neutrophils 67.3 %    % Lymphocytes 25.1 %    % Monocytes 6.5 %    % Eosinophils 0.5 %    % Basophils 0.4 %    % Immature Granulocytes 0.2 %    Absolute Neutrophil 5.5 1.6 - 8.3 10e9/L    Absolute Lymphocytes 2.1 0.8 - 5.3 10e9/L    Absolute Monocytes 0.5 0.0 - 1.3 10e9/L    Absolute Eosinophils 0.0 0.0 - 0.7 10e9/L    Absolute Basophils 0.0 0.0 - 0.2 10e9/L    Abs Immature Granulocytes 0.0 0 - 0.4 10e9/L   Anti Treponema- OB PANEL   Result Value Ref Range    Treponema " pallidum Antibody Negative NEG^Negative   Hepatitis B surface antigen OB PANEL   Result Value Ref Range    Hep B Surface Agn Nonreactive NR^Nonreactive   Rubella Antibody IgG Quantitative- OB PANEL   Result Value Ref Range    Rubella Antibody IgG Quantitative 83 IU/mL   *UA reflex to Microscopic- OB PANEL   Result Value Ref Range    Color Urine Yellow     Appearance Urine Clear     Glucose Urine Negative NEG^Negative mg/dL    Bilirubin Urine Negative NEG^Negative    Ketones Urine Trace (A) NEG^Negative mg/dL    Specific Gravity Urine 1.015 1.003 - 1.035    Blood Urine Negative NEG^Negative    pH Urine 6.5 5.0 - 7.0 pH    Protein Albumin Urine Negative NEG^Negative mg/dL    Urobilinogen Urine 0.2 0.2 - 1.0 EU/dL    Nitrite Urine Negative NEG^Negative    Leukocyte Esterase Urine Negative NEG^Negative    Source Midstream Urine    HIV Antigen Antibody Combo   Result Value Ref Range    HIV Antigen Antibody Combo Nonreactive NR^Nonreactive       TSH GH   Result Value Ref Range    Thyrotropin 0.60 0.34 - 5.60 IU/mL   Basic metabolic panel  (Ca, Cl, CO2, Creat, Gluc, K, Na, BUN)   Result Value Ref Range    Sodium 134 134 - 144 mmol/L    Potassium 3.5 3.5 - 5.1 mmol/L    Chloride 101 98 - 107 mmol/L    Carbon Dioxide 26 21 - 31 mmol/L    Anion Gap 7 3 - 14 mmol/L    Glucose 74 70 - 105 mg/dL    Urea Nitrogen 15 7 - 25 mg/dL    Creatinine 0.68 0.60 - 1.20 mg/dL    GFR Estimate >90 >60 mL/min/1.7m2    GFR Estimate If Black >90 >60 mL/min/1.7m2    Calcium 9.0 8.6 - 10.3 mg/dL   Magnesium   Result Value Ref Range    Magnesium 2.0 1.9 - 2.7 mg/dL   EKG 12-lead, tracing only    Narrative    Normal EKG with a rate of 75.  Compared to previous tracing dated 4/18/2006, bradycardia has resolved.  QTc prolongation has normalized.  Edin Vang MD   ABO and Rh- OB PANEL   Result Value Ref Range    ABO O     RH(D) Pos     Specimen Expires 04/09/2018    Antibody screen red cell- OB PANEL   Result Value Ref Range    Antibody Screen Neg     Urine Culture Aerobic Bacterial- OB PANEL   Result Value Ref Range    Specimen Description Midstream Urine     Culture Micro       10,000 to 50,000 colonies/mL  mixed urogenital libia  No further identification or sensitivity done     GC/Chlamydia by PCR - HI,GH   Result Value Ref Range    Specimen Source Cervical     Neisseria gonorrhoreae PCR Not Detected NDET^Not Detected    Chlamydia Trachomatis PCR Not Detected NDET^Not Detected       A/P: (O09.522) Elderly multigravida in second trimester  (primary encounter diagnosis)  Comment:   Plan: Declines screening for Down's Syndrome    Recheck in 4 weeks    Problem List:   Pregnancy risk factors include: AMA, History of PPH, vacuum delivery x 2, cardiac dysrhythmia.    Bertrand Johnson MD FACOG  3:56 PM 5/8/2018

## 2018-05-08 NOTE — NURSING NOTE
Patient presents to the clinic for her OB visit. She is 15w1d.  Ventura Santos ..............5/8/2018 3:54 PM

## 2018-06-05 ENCOUNTER — PRENATAL OFFICE VISIT (OUTPATIENT)
Dept: OBGYN | Facility: OTHER | Age: 38
End: 2018-06-05
Attending: OBSTETRICS & GYNECOLOGY
Payer: COMMERCIAL

## 2018-06-05 ENCOUNTER — HOSPITAL ENCOUNTER (OUTPATIENT)
Dept: ULTRASOUND IMAGING | Facility: OTHER | Age: 38
Discharge: HOME | End: 2018-06-05
Attending: OBSTETRICS & GYNECOLOGY | Admitting: OBSTETRICS & GYNECOLOGY
Payer: COMMERCIAL

## 2018-06-05 VITALS
BODY MASS INDEX: 28.12 KG/M2 | WEIGHT: 174.2 LBS | SYSTOLIC BLOOD PRESSURE: 106 MMHG | HEART RATE: 88 BPM | DIASTOLIC BLOOD PRESSURE: 68 MMHG

## 2018-06-05 DIAGNOSIS — O09.522 ELDERLY MULTIGRAVIDA IN SECOND TRIMESTER: ICD-10-CM

## 2018-06-05 DIAGNOSIS — Z3A.19 19 WEEKS GESTATION OF PREGNANCY: Primary | ICD-10-CM

## 2018-06-05 PROCEDURE — 76805 OB US >/= 14 WKS SNGL FETUS: CPT

## 2018-06-05 PROCEDURE — 99207 ZZC OB VISIT-NO CHARGE - GICH ONLY: CPT | Performed by: OBSTETRICS & GYNECOLOGY

## 2018-06-05 ASSESSMENT — PAIN SCALES - GENERAL: PAINLEVEL: NO PAIN (0)

## 2018-06-05 NOTE — MR AVS SNAPSHOT
"              After Visit Summary   6/5/2018    Christine Moncada    MRN: [id number]           Patient Information     Date Of Birth          1980        Visit Information        Provider Department      6/5/2018 2:00 PM Bertrand Johnson MD [facility]         Follow-ups after your visit        Your next 10 appointments already scheduled     Jul 03, 2018 10:45 AM CDT   ESTABLISHED PRENATAL with Bertrand Johnson MD   [facility] ([facility]    [address]  [place] [address] [phone]              Who to contact     If you have questions or need follow up information about today's clinic visit or your schedule please contact [facility] directly at [phone].  Normal or non-critical lab and imaging results will be communicated to you by OmniV[employer]art, letter or phone within 4 business days after the clinic has received the results. If you do not hear from us within 7 days, please contact the clinic through MyChart or phone. If you have a critical or abnormal lab result, we will notify you by phone as soon as possible.  Submit refill requests through [employer] or call your pharmacy and they will forward the refill request to us. Please allow 3 business days for your refill to be completed.          Additional Information About Your Visit        MyChart Information     [employer] lets you send messages to your doctor, view your test results, renew your prescriptions, schedule appointments and more. To sign up, go to [email or web address] . Click on \"Log in\" on the left side of the screen, which will take you to the Welcome page. Then click on \"Sign up Now\" on the right side of the page.     You will be asked to enter the access code listed below, as well as some personal information. Please follow the directions to create your username and password.     Your access code is: [email or web address]  Expires: 6/5/2018  5:58 PM   "   Your access code will  in 90 days. If you need help or a new code, please call your [place] clinic or [phone].        Care EveryWhere ID     This is your Care EveryWhere ID. This could be used by other organizations to access your [place] medical records  [id number]        Your Vitals Were     Pulse Last Period Breastfeeding? BMI (Body Mass Index)          88 (LMP Unknown) No 28.12 kg/m2         Blood Pressure from Last 3 Encounters:   18 106/68   18 110/80   18 112/76    Weight from Last 3 Encounters:   18 79 kg (174 lb 3.2 oz)   18 77.4 kg (170 lb 11.2 oz)   18 75.2 kg (165 lb 12.8 oz)              Today, you had the following     No orders found for display         Today's Medication Changes          These changes are accurate as of 18  2:39 PM.  If you have any questions, ask your nurse or doctor.               Stop taking these medicines if you haven't already. Please contact your care team if you have questions.     acetaminophen 500 MG tablet   Commonly known as:  TYLENOL   Stopped by:  Bertrand Johnson MD           metroNIDAZOLE 0.75 % cream   Commonly known as:  METROCREAM   Stopped by:  Bertrand Johnson MD                    Primary Care Provider Office Phone # Fax #    Baylee Fadia MD [place] [phone]       [address] [facility] [address]        Equal Access to Services     [facility]: Hadii milton medelo Timmy, waaxda luqadaha, qaybta kaalmada marivel, gaurang carbajal. So [facility] [phone].    ATENCIÓN: Si habla español, tiene a judge disposición servicios gratuitos de asistencia lingüística. Llame al [phone].    We comply with applicable federal civil rights laws and [place] laws. We do not discriminate on the basis of race, color, national origin, age, disability, sex, sexual orientation, or gender identity.            Thank you!     Thank you for choosing [facility]  AND HOSPITAL  for your care. Our goal is always to provide you with excellent care. Hearing back from our patients is one way we can continue to improve our services. Please take a few minutes to complete the written survey that you may receive in the mail after your visit with us. Thank you!             Your Updated Medication List - Protect others around you: Learn how to safely use, store and throw away your medicines at www.disposemymeds.org.          This list is accurate as of 6/5/18  2:39 PM.  Always use your most recent med list.                   Brand Name Dispense Instructions for use Diagnosis    Prenatal Vitamins 0.8 MG Tabs     90 tablet    1 tab daily    Amenorrhea

## 2018-06-05 NOTE — PROGRESS NOTES
CC: Recheck OB visit at 19w1d    HPI: Christine Moncada presents for a routine OB visit now at 19w1d  She has no concerns. Denies cramping, bleeding, normal fetal movement    Obstetric History       T2      L2     SAB1   TAB0   Ectopic0   Multiple0   Live Births2       # Outcome Date GA Lbr Yao/2nd Weight Sex Delivery Anes PTL Lv   4 Current            3 SAB            2 Term     M Vag-Vacuum   ANGELI   1 Term     M Vag-Vacuum   ANGELI      Complications: Excessive Vaginal Bleeding        Current Outpatient Prescriptions   Medication     Prenatal Multivit-Min-Fe-FA (PRENATAL VITAMINS) 0.8 MG TABS     No current facility-administered medications for this visit.          O: /68 (BP Location: Right arm)  Pulse 88  Wt 79 kg (174 lb 3.2 oz)  LMP  (LMP Unknown)  Breastfeeding? No  BMI 28.12 kg/m2  Body mass index is 28.12 kg/(m^2).  See OB flow sheet  EXAM:  NAD      Results for orders placed or performed in visit on 18   CBC with platelets differential- OB PANEL   Result Value Ref Range    WBC 8.2 4.0 - 11.0 10e9/L    RBC Count 4.25 3.8 - 5.2 10e12/L    Hemoglobin 12.6 11.7 - 15.7 g/dL    Hematocrit 37.4 35.0 - 47.0 %    MCV 88 78 - 100 fl    MCH 29.6 26.5 - 33.0 pg    MCHC 33.7 31.5 - 36.5 g/dL    RDW 12.7 10.0 - 15.0 %    Platelet Count 302 150 - 450 10e9/L    Diff Method Automated Method     % Neutrophils 67.3 %    % Lymphocytes 25.1 %    % Monocytes 6.5 %    % Eosinophils 0.5 %    % Basophils 0.4 %    % Immature Granulocytes 0.2 %    Absolute Neutrophil 5.5 1.6 - 8.3 10e9/L    Absolute Lymphocytes 2.1 0.8 - 5.3 10e9/L    Absolute Monocytes 0.5 0.0 - 1.3 10e9/L    Absolute Eosinophils 0.0 0.0 - 0.7 10e9/L    Absolute Basophils 0.0 0.0 - 0.2 10e9/L    Abs Immature Granulocytes 0.0 0 - 0.4 10e9/L   Anti Treponema- OB PANEL   Result Value Ref Range    Treponema pallidum Antibody Negative NEG^Negative   Hepatitis B surface antigen OB PANEL   Result Value Ref Range    Hep B Surface Agn  Nonreactive NR^Nonreactive   Rubella Antibody IgG Quantitative- OB PANEL   Result Value Ref Range    Rubella Antibody IgG Quantitative 83 IU/mL   *UA reflex to Microscopic- OB PANEL   Result Value Ref Range    Color Urine Yellow     Appearance Urine Clear     Glucose Urine Negative NEG^Negative mg/dL    Bilirubin Urine Negative NEG^Negative    Ketones Urine Trace (A) NEG^Negative mg/dL    Specific Gravity Urine 1.015 1.003 - 1.035    Blood Urine Negative NEG^Negative    pH Urine 6.5 5.0 - 7.0 pH    Protein Albumin Urine Negative NEG^Negative mg/dL    Urobilinogen Urine 0.2 0.2 - 1.0 EU/dL    Nitrite Urine Negative NEG^Negative    Leukocyte Esterase Urine Negative NEG^Negative    Source Midstream Urine    HIV Antigen Antibody Combo   Result Value Ref Range    HIV Antigen Antibody Combo Nonreactive NR^Nonreactive       TSH GH   Result Value Ref Range    Thyrotropin 0.60 0.34 - 5.60 IU/mL   Basic metabolic panel  (Ca, Cl, CO2, Creat, Gluc, K, Na, BUN)   Result Value Ref Range    Sodium 134 134 - 144 mmol/L    Potassium 3.5 3.5 - 5.1 mmol/L    Chloride 101 98 - 107 mmol/L    Carbon Dioxide 26 21 - 31 mmol/L    Anion Gap 7 3 - 14 mmol/L    Glucose 74 70 - 105 mg/dL    Urea Nitrogen 15 7 - 25 mg/dL    Creatinine 0.68 0.60 - 1.20 mg/dL    GFR Estimate >90 >60 mL/min/1.7m2    GFR Estimate If Black >90 >60 mL/min/1.7m2    Calcium 9.0 8.6 - 10.3 mg/dL   Magnesium   Result Value Ref Range    Magnesium 2.0 1.9 - 2.7 mg/dL   EKG 12-lead, tracing only    Narrative    Normal EKG with a rate of 75.  Compared to previous tracing dated 4/18/2006, bradycardia has resolved.  QTc prolongation has normalized.  Edin Vang MD   ABO and Rh- OB PANEL   Result Value Ref Range    ABO O     RH(D) Pos     Specimen Expires 04/09/2018    Antibody screen red cell- OB PANEL   Result Value Ref Range    Antibody Screen Neg    Urine Culture Aerobic Bacterial- OB PANEL   Result Value Ref Range    Specimen Description Midstream Urine     Culture  Micro       10,000 to 50,000 colonies/mL  mixed urogenital libia  No further identification or sensitivity done     GC/Chlamydia by PCR - HI,GH   Result Value Ref Range    Specimen Source Cervical     Neisseria gonorrhoreae PCR Not Detected NDET^Not Detected    Chlamydia Trachomatis PCR Not Detected NDET^Not Detected       A/P: (Z3A.19) 19 weeks gestation of pregnancy  (primary encounter diagnosis)  Comment:   Plan:         Recheck in 4 weeks    Problem List:     AMA- declines aneuploidy testing.  History of PPH, vacuum delivery x 2,   cardiac dysrhythmia- stable    Bertrand Johnson MD FACOG  2:24 PM 6/5/2018

## 2018-07-03 ENCOUNTER — PRENATAL OFFICE VISIT (OUTPATIENT)
Dept: OBGYN | Facility: OTHER | Age: 38
End: 2018-07-03
Attending: OBSTETRICS & GYNECOLOGY
Payer: COMMERCIAL

## 2018-07-03 VITALS
SYSTOLIC BLOOD PRESSURE: 112 MMHG | HEART RATE: 82 BPM | BODY MASS INDEX: 28.91 KG/M2 | DIASTOLIC BLOOD PRESSURE: 78 MMHG | WEIGHT: 179.1 LBS

## 2018-07-03 DIAGNOSIS — Z3A.23 23 WEEKS GESTATION OF PREGNANCY: Primary | ICD-10-CM

## 2018-07-03 DIAGNOSIS — O26.842 UTERINE SIZE-DATE DISCREPANCY IN SECOND TRIMESTER: ICD-10-CM

## 2018-07-03 PROCEDURE — 99207 ZZC OB VISIT-NO CHARGE - GICH ONLY: CPT | Performed by: OBSTETRICS & GYNECOLOGY

## 2018-07-03 ASSESSMENT — PAIN SCALES - GENERAL: PAINLEVEL: NO PAIN (0)

## 2018-07-03 NOTE — PROGRESS NOTES
CC: Recheck OB visit at 23w1d    HPI: Christine Moncada presents for a routine OB visit now at 23w1d  She has no concerns. Denies cramping, bleeding, normal fetal movement    Obstetric History       T2      L2     SAB1   TAB0   Ectopic0   Multiple0   Live Births2       # Outcome Date GA Lbr Yao/2nd Weight Sex Delivery Anes PTL Lv   4 Current            3 SAB            2 Term     M Vag-Vacuum   ANGELI   1 Term     M Vag-Vacuum   ANGELI      Complications: Excessive Vaginal Bleeding        Current Outpatient Prescriptions   Medication     Prenatal Multivit-Min-Fe-FA (PRENATAL VITAMINS) 0.8 MG TABS     No current facility-administered medications for this visit.          O: /78 (BP Location: Right arm)  Pulse 82  Wt 81.2 kg (179 lb 1.6 oz)  LMP  (LMP Unknown)  BMI 28.91 kg/m2  Body mass index is 28.91 kg/(m^2).  See OB flow sheet  EXAM:  NAD      Results for orders placed or performed during the hospital encounter of 18   US OB > 14 Weeks Complete Single    Narrative    OB ULTRASOUND REPORT     Clinical:  38 years  pregnant female  at 20 weeks gestation, anatomy  screening evaluation.    Gestation:  1    Presentation: Breech    Lie:  Oblique    Cardiac Activity:  144 bpm    Placenta: Posterior    Previa:  No Previa    Cervix:  3.3 cm in length    JENNY:  11.0 cm    Measurements:    BPD:  18 weeks 6 days    HC:  19 weeks 3 days    AC:  20 weeks 2 days    FL:  19 weeks 6 days    Estimated Fetal Weight:  322 grams    US age:  19 weeks 5 days    Gestational Age by LMP:  19 weeks 1 days    US EDC (Current Study):  10/25/2018   Estimated fetal weight is at the 57th percentile.        Structural Survey:    Head:  Unremarkable    Spine:  Unremarkable    Stomach:  Unremarkable    Kidneys:  Unremarkable    Bladder:  Unremarkable    3 Vessel Cord:  Unremarkable    Cord Insertion:  Unremarkable    4 Chamber Heart:  Unremarkable    Ant. Abd Wall:  Unremarkable    Diaphragm:  Unremarkable             Impression    Impression: Single living intrauterine gestation with appropriate  fetal growth. No anatomic abnormalities are identified.    MALINDA MURILLO MD       A/P: AMA    Recheck in 4 weeks      Problem List:   AMA- declines aneuploidy testing. (32 weeks NST's)  History of PPH, vacuum delivery x 2,   cardiac dysrhythmia- stable  Size>dates    Bertrand Johnson MD FACOG  10:44 AM 7/3/2018

## 2018-07-03 NOTE — MR AVS SNAPSHOT
After Visit Summary   7/3/2018    Christine Moncada    MRN: [id number]           Patient Information     Date Of Birth          1980        Visit Information        Provider Department      7/3/2018 10:45 AM Bertrand Johnson MD [facility]        Today's Diagnoses     23 weeks gestation of pregnancy    -  1    Uterine size-date discrepancy in second trimester           Follow-ups after your visit        Follow-up notes from your care team     Return in about 4 weeks (around 7/31/2018).      Your next 10 appointments already scheduled     Jul 31, 2018  8:15 AM CDT   (Arrive by 8:00 AM)   US OB LIMITED ONE OR MORE FETUSES with GHUS1   [facility] ([facility]    [address]  [place] [id number] [phone]           Please bring a list of your medicines (including vitamins, minerals and over-the-counter drugs). Also, tell your doctor about any allergies you may have. Wear comfortable clothes and leave your valuables at home.  If you re less than 20 weeks drink four 8-ounce glasses of fluid an hour before your exam. If you need to empty your bladder before your exam, try to release only a little urine. Then, drink another glass of fluid.  You may have up to two family members in the exam room. If you bring a small child, an adult must be there to care for him or her.  Please call the Imaging Department at your exam site with any questions.            Jul 31, 2018  9:15 AM CDT   ESTABLISHED PRENATAL with Bertrand Johnson MD   [facility] ([facility])    [address] [facility] [id number] [phone]              Future tests that were ordered for you today     Open Future Orders        Priority Expected Expires Ordered    Glucose tolerance, gest screen, 1 hour Routine 7/31/2018 7/3/2019 7/3/2018    Hemoglobin Routine  7/3/2019 7/3/2018    Treponema Abs w Reflex to  "RPR and Titer Routine  7/3/2019 7/3/2018    US OB Limited One Or More Fetuses Routine 2018 7/3/2019 7/3/2018            Who to contact     If you have questions or need follow up information about today's clinic visit or your schedule please contact [facility] directly at [phone].  Normal or non-critical lab and imaging results will be communicated to you by [employer], letter or phone within 4 business days after the clinic has received the results. If you do not hear from us within 7 days, please contact the clinic through [employer] or phone. If you have a critical or abnormal lab result, we will notify you by phone as soon as possible.  Submit refill requests through [employer] or call your pharmacy and they will forward the refill request to us. Please allow 3 business days for your refill to be completed.          Additional Information About Your Visit        Pump [employer] Information     [employer] lets you send messages to your doctor, view your test results, renew your prescriptions, schedule appointments and more. To sign up, go to [email or web address] . Click on \"Log in\" on the left side of the screen, which will take you to the Welcome page. Then click on \"Sign up Now\" on the right side of the page.     You will be asked to enter the access code listed below, as well as some personal information. Please follow the directions to create your username and password.     Your access code is: [email or web address]  Expires: 10/1/2018 10:59 AM     Your access code will  in 90 days. If you need help or a new code, please call your [place] clinic or [phone].        Care EveryWhere ID     This is your Care EveryWhere ID. This could be used by other organizations to access your [place] medical records  [id number]        Your Vitals Were     Pulse Last Period BMI (Body Mass Index)             82 (LMP Unknown) 28.91 kg/m2          Blood Pressure from Last 3 Encounters:   18 112/78 "   06/05/18 106/68   05/08/18 110/80    Weight from Last 3 Encounters:   07/03/18 81.2 kg (179 lb 1.6 oz)   06/05/18 79 kg (174 lb 3.2 oz)   05/08/18 77.4 kg (170 lb 11.2 oz)               Primary Care Provider Office Phone # Fax #    Baylee Fadia Lyle [phone]       [address] [facility] [address]        Equal Access to Services     JOSELO CARTER : Hadii aad ku hadasho Soomaali, waaxda luqadaha, qaybta kaalmada adeegyada, waxblanca cardona hayabdoulaye childs . So [facility] [phone].    ATENCIÓN: Si habla español, tiene a judge disposición servicios gratuitos de asistencia lingüística. Llame al [phone].    We comply with applicable federal civil rights laws and [place] laws. We do not discriminate on the basis of race, color, national origin, age, disability, sex, sexual orientation, or gender identity.            Thank you!     Thank you for choosing [facility]  for your care. Our goal is always to provide you with excellent care. Hearing back from our patients is one way we can continue to improve our services. Please take a few minutes to complete the written survey that you may receive in the mail after your visit with us. Thank you!             Your Updated Medication List - Protect others around you: Learn how to safely use, store and throw away your medicines at www.disposemymeds.org.          This list is accurate as of 7/3/18 11:06 AM.  Always use your most recent med list.                   Brand Name Dispense Instructions for use Diagnosis    Prenatal Vitamins 0.8 MG Tabs     90 tablet    1 tab daily    Amenorrhea

## 2018-07-23 NOTE — PROGRESS NOTES
Patient Information     Patient Name  Christine Moncada MRN  [id number] Sex  Female   1980      Letter by Baylee Lyle MD at      Author:  Baylee Lyle MD Service:  (none) Author Type:  (none)    Filed:   Encounter Date:  3/5/2017 Status:  (Other)           Christine Moncada  [address]  [facility] [address]          2017    Dear Ms. Moncada:    A LIMITED refill of CRYSELLE 0.3-30 mg-mcg tablet has been called into your pharmacy.    Additional refills require an annual physical appointment with Austin Kerr MD. Please call the clinic at [phone] to schedule your appointment.    Thank you,    The Refill Nurse  [facility]

## 2018-07-31 ENCOUNTER — PRENATAL OFFICE VISIT (OUTPATIENT)
Dept: OBGYN | Facility: OTHER | Age: 38
End: 2018-07-31
Attending: OBSTETRICS & GYNECOLOGY
Payer: COMMERCIAL

## 2018-07-31 ENCOUNTER — HOSPITAL ENCOUNTER (OUTPATIENT)
Dept: ULTRASOUND IMAGING | Facility: OTHER | Age: 38
Discharge: HOME | End: 2018-07-31
Attending: OBSTETRICS & GYNECOLOGY | Admitting: OBSTETRICS & GYNECOLOGY
Payer: COMMERCIAL

## 2018-07-31 VITALS
DIASTOLIC BLOOD PRESSURE: 72 MMHG | SYSTOLIC BLOOD PRESSURE: 112 MMHG | BODY MASS INDEX: 29.86 KG/M2 | HEART RATE: 88 BPM | WEIGHT: 185 LBS

## 2018-07-31 DIAGNOSIS — R73.02 GLUCOSE INTOLERANCE (IMPAIRED GLUCOSE TOLERANCE): Primary | ICD-10-CM

## 2018-07-31 DIAGNOSIS — O09.522 ELDERLY MULTIGRAVIDA IN SECOND TRIMESTER: ICD-10-CM

## 2018-07-31 DIAGNOSIS — Z3A.27 27 WEEKS GESTATION OF PREGNANCY: Primary | ICD-10-CM

## 2018-07-31 DIAGNOSIS — R73.02 GLUCOSE INTOLERANCE (IMPAIRED GLUCOSE TOLERANCE): ICD-10-CM

## 2018-07-31 DIAGNOSIS — O26.842 UTERINE SIZE-DATE DISCREPANCY IN SECOND TRIMESTER: ICD-10-CM

## 2018-07-31 LAB
GLUCOSE 1H P 50 G GLC PO SERPL-MCNC: 158 MG/DL (ref 60–129)
HGB BLD-MCNC: 12.5 G/DL (ref 11.7–15.7)

## 2018-07-31 PROCEDURE — 76816 OB US FOLLOW-UP PER FETUS: CPT

## 2018-07-31 PROCEDURE — 99207 ZZC OB VISIT-NO CHARGE - GICH ONLY: CPT | Mod: 25 | Performed by: OBSTETRICS & GYNECOLOGY

## 2018-07-31 PROCEDURE — 85018 HEMOGLOBIN: CPT | Performed by: OBSTETRICS & GYNECOLOGY

## 2018-07-31 PROCEDURE — 86780 TREPONEMA PALLIDUM: CPT | Performed by: OBSTETRICS & GYNECOLOGY

## 2018-07-31 PROCEDURE — 82950 GLUCOSE TEST: CPT | Performed by: OBSTETRICS & GYNECOLOGY

## 2018-07-31 PROCEDURE — 90471 IMMUNIZATION ADMIN: CPT | Performed by: OBSTETRICS & GYNECOLOGY

## 2018-07-31 PROCEDURE — 90715 TDAP VACCINE 7 YRS/> IM: CPT | Performed by: OBSTETRICS & GYNECOLOGY

## 2018-07-31 PROCEDURE — 36415 COLL VENOUS BLD VENIPUNCTURE: CPT | Performed by: OBSTETRICS & GYNECOLOGY

## 2018-07-31 ASSESSMENT — PAIN SCALES - GENERAL: PAINLEVEL: NO PAIN (0)

## 2018-07-31 NOTE — MR AVS SNAPSHOT
"              After Visit Summary   7/31/2018    Christine Moncada    MRN: [id number]           Patient Information     Date Of Birth          1980        Visit Information        Provider Department      7/31/2018 9:15 AM Bertrand Johnson MD [facility]        Today's Diagnoses     27 weeks gestation of pregnancy    -  1    Elderly multigravida in second trimester           Follow-ups after your visit        Future tests that were ordered for you today     Open Future Orders        Priority Expected Expires Ordered    Treponema Ab w Reflex to RPR and Titer Routine  7/31/2019 7/31/2018    Hemoglobin Routine  7/31/2019 7/31/2018    Glucose tolerance, gest screen, 1 hour Routine  7/31/2019 7/31/2018    US OB Single Follow Up Repeat Routine 7/31/2018 7/3/2019 7/3/2018            Who to contact     If you have questions or need follow up information about today's clinic visit or your schedule please contact [facility] directly at [phone].  Normal or non-critical lab and imaging results will be communicated to you by [employer], letter or phone within 4 business days after the clinic has received the results. If you do not hear from us within 7 days, please contact the clinic through [employer] or phone. If you have a critical or abnormal lab result, we will notify you by phone as soon as possible.  Submit refill requests through [employer]IRSIS or call your pharmacy and they will forward the refill request to us. Please allow 3 business days for your refill to be completed.          Additional Information About Your Visit        [email or web address] Information     AIRSIS lets you send messages to your doctor, view your test results, renew your prescriptions, schedule appointments and more. To sign up, go to [email or web address] . Click on \"Log in\" on the left side of the screen, which will take you to the Welcome page. Then click on \"Sign up Now\" on the right side of the page.     You will " be asked to enter the access code listed below, as well as some personal information. Please follow the directions to create your username and password.     Your access code is: [email or web address]  Expires: 10/1/2018 10:59 AM     Your access code will  in 90 days. If you need help or a new code, please call your [place] clinic or [phone].        Care EveryWhere ID     This is your Care EveryWhere ID. This could be used by other organizations to access your [place] medical records  [id number]        Your Vitals Were     Pulse Last Period BMI (Body Mass Index)             88 (LMP Unknown) 29.86 kg/m2          Blood Pressure from Last 3 Encounters:   18 112/72   18 112/78   18 106/68    Weight from Last 3 Encounters:   18 83.9 kg (185 lb)   18 81.2 kg (179 lb 1.6 oz)   18 79 kg (174 lb 3.2 oz)              We Performed the Following     TDAP VACCINE (BOOSTRIX)        Primary Care Provider Office Phone # Fax #    Baylee Fadia Lyle [phone] [address] [facility] [address]        Equal Access to Services     JOSELO [facility] : Hadii aad ku hadasho Sonickali, waaxda luqadaha, qaybta kaalmada adeegyada, gaurang childs . So [facility] [phone].    ATENCIÓN: Si habla español, tiene a judge disposición servicios gratuitos de asistencia lingüística. Llame al [phone].    We comply with applicable federal civil rights laws and [place] laws. We do not discriminate on the basis of race, color, national origin, age, disability, sex, sexual orientation, or gender identity.            Thank you!     Thank you for choosing [facility] AND [facility]  for your care. Our goal is always to provide you with excellent care. Hearing back from our patients is one way we can continue to improve our services. Please take a few minutes to complete the written survey that you may receive in the mail after your visit with us.  Thank you!             Your Updated Medication List - Protect others around you: Learn how to safely use, store and throw away your medicines at www.disposemymeds.org.          This list is accurate as of 7/31/18  9:37 AM.  Always use your most recent med list.                   Brand Name Dispense Instructions for use Diagnosis    Prenatal Vitamins 0.8 MG Tabs     90 tablet    1 tab daily    Amenorrhea

## 2018-07-31 NOTE — NURSING NOTE
Given TDap immunization.  See immunization record.  Given GlucoCrush 50 gram po 7-31-18 at 0919.  Pt escorted to lab by this RN for one hour follow up GTT to be drawn.  Jennifer Evans RN on 7/31/2018 at 9:29 AM

## 2018-07-31 NOTE — NURSING NOTE
Patient presents for routine OB care currently at 27w1d. Patient was offered the breastfeeding booklet,[employer] flyer, Operational Delivery consent for review. 2 Babystep coupons given.     Dulce Maria Wakefield LPN............. 7/31/2018 8:52 AM

## 2018-07-31 NOTE — PROGRESS NOTES
CC: Recheck OB visit at 27w1d    HPI: Christine Moncada presents for a routine OB visit now at 27w1d  She has no concerns. Denies cramping, bleeding, normal fetal movement    Obstetric History       T2      L2     SAB1   TAB0   Ectopic0   Multiple0   Live Births2       # Outcome Date GA Lbr Yao/2nd Weight Sex Delivery Anes PTL Lv   4 Current            3 SAB            2 Term     M Vag-Vacuum   ANGELI   1 Term     M Vag-Vacuum   ANGELI      Complications: Excessive Vaginal Bleeding        Current Outpatient Prescriptions   Medication     Prenatal Multivit-Min-Fe-FA (PRENATAL VITAMINS) 0.8 MG TABS     No current facility-administered medications for this visit.          O: /72 (BP Location: Right arm)  Pulse 88  Wt 83.9 kg (185 lb)  LMP  (LMP Unknown)  BMI 29.86 kg/m2  Body mass index is 29.86 kg/(m^2).  See OB flow sheet  EXAM:  NAD      Results for orders placed or performed during the hospital encounter of 18   US OB Single Follow Up Repeat    Narrative    OB ULTRASOUND REPORT     Clinical: , ; Uterine size-date discrepancy in second trimester.  Gestation:  1  Presentation: Cephalic  Lie:  Longitudinal  Cardiac Activity:  Regular  BPM:  136  Movement:  Yes  Placenta: Posterior  ndGndrndanddndend:nd nd2nd Previa:  No Previa  Cervix:  4.7 cm in length  Amniotic Fluid: Normal  JENNY:  18.1 cm    Measurements:    BPD:  27 weeks 3 days  HC:  27 weeks 5 days  AC:  29 weeks 3 days  FL:  27 weeks 6 days    Estimated Fetal Weight:  1235 grams  HC/AC:  1.01  US age (current):  28 weeks 1 days  Gestational age:  27 weeks 1 days  US EDC (preferred):  10/29/   %WT for EGA (preferred dating):  77 %    Mild prominence of the renal pelvices is within normal limits for  gestational age, measuring 5 mm on the right and 4 mm on the left.      Impression    IMPRESSION: Single viable intrauterine pregnancy demonstrating  appropriate interval growth.    LAUREN CHINCHILLA MD       A/P:  27w1d gestation  GCT, tdap, et al  today  O pos BT    Recheck in 4 weeks    Problem List:   AMA- declines aneuploidy testing. (32 weeks NST's)  History of PPH, vacuum delivery x 2,   cardiac dysrhythmia- stable  Size>dates    Bertrand Johnson MD FACOG  9:04 AM 7/31/2018

## 2018-08-01 LAB — T PALLIDUM AB SER QL: NONREACTIVE

## 2018-08-07 DIAGNOSIS — R73.02 GLUCOSE INTOLERANCE (IMPAIRED GLUCOSE TOLERANCE): ICD-10-CM

## 2018-08-07 LAB
GLUCOSE 1H P 100 G GLC PO SERPL-MCNC: 172 MG/DL (ref 60–179)
GLUCOSE 2H P 100 G GLC PO SERPL-MCNC: 103 MG/DL (ref 60–154)
GLUCOSE 3H P 100 G GLC PO SERPL-MCNC: 49 MG/DL (ref 60–139)
GLUCOSE P FAST SERPL-MCNC: 88 MG/DL (ref 60–94)

## 2018-08-07 PROCEDURE — 82952 GTT-ADDED SAMPLES: CPT | Performed by: OBSTETRICS & GYNECOLOGY

## 2018-08-07 PROCEDURE — 36415 COLL VENOUS BLD VENIPUNCTURE: CPT | Performed by: OBSTETRICS & GYNECOLOGY

## 2018-08-07 PROCEDURE — 82951 GLUCOSE TOLERANCE TEST (GTT): CPT | Performed by: OBSTETRICS & GYNECOLOGY

## 2018-09-06 ENCOUNTER — PRENATAL OFFICE VISIT (OUTPATIENT)
Dept: OBGYN | Facility: OTHER | Age: 38
End: 2018-09-06
Attending: SURGERY
Payer: COMMERCIAL

## 2018-09-06 VITALS
DIASTOLIC BLOOD PRESSURE: 84 MMHG | WEIGHT: 190.3 LBS | SYSTOLIC BLOOD PRESSURE: 122 MMHG | BODY MASS INDEX: 30.72 KG/M2 | HEART RATE: 80 BPM

## 2018-09-06 DIAGNOSIS — O09.523 ELDERLY MULTIGRAVIDA IN THIRD TRIMESTER: Primary | ICD-10-CM

## 2018-09-06 PROCEDURE — 99207 ZZC OB VISIT-NO CHARGE - GICH ONLY: CPT | Performed by: OBSTETRICS & GYNECOLOGY

## 2018-09-06 ASSESSMENT — PAIN SCALES - GENERAL: PAINLEVEL: NO PAIN (0)

## 2018-09-06 NOTE — PROGRESS NOTES
CC: Recheck OB visit at 32w3d    HPI: Christine Moncada presents for a routine OB visit now at 32w3d  She has no concerns. Denies cramping, bleeding, normal fetal movement    Obstetric History       T2      L2     SAB1   TAB0   Ectopic0   Multiple0   Live Births2       # Outcome Date GA Lbr Yao/2nd Weight Sex Delivery Anes PTL Lv   4 Current            3 SAB            2 Term     M Vag-Vacuum   ANGELI   1 Term     M Vag-Vacuum   ANGELI      Complications: Excessive Vaginal Bleeding        Current Outpatient Prescriptions   Medication     Prenatal Multivit-Min-Fe-FA (PRENATAL VITAMINS) 0.8 MG TABS     No current facility-administered medications for this visit.          O: /84 (BP Location: Right arm)  Pulse 80  Wt 86.3 kg (190 lb 4.8 oz)  LMP  (LMP Unknown)  BMI 30.72 kg/m2  Body mass index is 30.72 kg/(m^2).  See OB flow sheet  EXAM:  NAD  FH 33.5      NST doucmentation:    Indication: AMA  Duration: 7326-9565  32w3d  FHR baseline: 130  Accelerations: y  Decelerations: n  Contractions: y    Category 1, reassuring for GA while not technically reactive    Results for orders placed or performed in visit on 18   Glucose tolerance, gest std 100 gm, 3 hr   Result Value Ref Range    Glucose Fasting 100 Grams 88 60 - 94 mg/dL    Glucose 1 Hour 100 Grams 172 60 - 179 mg/dL    Glucose 2 Hour 100 Grams 103 60 - 154 mg/dL    Glucose 3 Hour 100 Grams 49 (L) 60 - 139 mg/dL       A/P: (O09.523) Elderly multigravida in third trimester  (primary encounter diagnosis)    Recheck in 1 week    Problem List:   AMA- declines aneuploidy testing. (32 weeks NST's)  History of PPH, vacuum delivery x 2,   cardiac dysrhythmia- stable  Size>dates    Bertrand Johnson MD FACOG  3:40 PM 2018

## 2018-09-06 NOTE — MR AVS SNAPSHOT
After Visit Summary   9/6/2018    Christine Moncada    MRN: [id number]           Patient Information     Date Of Birth          1980        Visit Information        Provider Department      9/6/2018 3:30 PM Bertrand Johnson MD [facility]        Today's Diagnoses     Elderly multigravida in third trimester    -  1       Follow-ups after your visit        Your next 10 appointments already scheduled     Sep 14, 2018  2:30 PM CDT   ESTABLISHED PRENATAL with Tonny Sanchez MD   [facility] ([facility])    [address]  [place] [address]   [phone]            Sep 18, 2018  3:30 PM CDT   ESTABLISHED PRENATAL with Bertrand Johnson MD   [facility] ([facility])    [address]  [place] [address]   [phone]            Sep 28, 2018  3:30 PM CDT   ESTABLISHED PRENATAL with Tonny Sanchez MD   [facility] ([facility])    [address]   [place] [address]   [phone]            Oct 02, 2018  3:45 PM CDT   ESTABLISHED PRENATAL with Bertrand Johnson MD   [facility] ([facility])    [address]  [place] [address]   [phone]            Oct 08, 2018  3:15 PM CDT   ESTABLISHED PRENATAL with Bertrand Johnson MD   [facility] ([facility])    [address]   [place] [address]   [phone]            Oct 18, 2018 10:45 AM CDT   ESTABLISHED PRENATAL with Bertrand Johnson MD   [facility] ([facility])    [address]   [place] [address]   [phone]            Oct 23, 2018  3:15 PM CDT   ESTABLISHED PRENATAL with Bertrand Johnson MD   [facility] ([facility])    [address]   [place]  "[id number] [phone]            Oct 29, 2018  2:45 PM CDT   ESTABLISHED PRENATAL with Bertrand Johnson MD   [facility] ([facility])    [address]  [place] [address] [phone]              Who to contact     If you have questions or need follow up information about today's clinic visit or your schedule please contact [facility] directly at [phone].  Normal or non-critical lab and imaging results will be communicated to you by MyChart, letter or phone within 4 business days after the clinic has received the results. If you do not hear from us within 7 days, please contact the clinic through [employer] or phone. If you have a critical or abnormal lab result, we will notify you by phone as soon as possible.  Submit refill requests through [employer] or call your pharmacy and they will forward the refill request to us. Please allow 3 business days for your refill to be completed.          Additional Information About Your Visit        [employer] Information     [employer] lets you send messages to your doctor, view your test results, renew your prescriptions, schedule appointments and more. To sign up, go to [email or web address] . Click on \"Log in\" on the left side of the screen, which will take you to the Welcome page. Then click on \"Sign up Now\" on the right side of the page.     You will be asked to enter the access code listed below, as well as some personal information. Please follow the directions to create your username and password.     Your access code is: [email or web address]  Expires: 2018  3:53 PM     Your access code will  in 90 days. If you need help or a new code, please call your [place] clinic or [phone].        Care EveryWhere ID     This is your Care EveryWhere ID. This could be used by other organizations to access your [place] medical records  [id number]        Your Vitals Were     Pulse Last Period " BMI (Body Mass Index)             80 (LMP Unknown) 30.72 kg/m2          Blood Pressure from Last 3 Encounters:   09/06/18 122/84   07/31/18 112/72   07/03/18 112/78    Weight from Last 3 Encounters:   09/06/18 86.3 kg (190 lb 4.8 oz)   07/31/18 83.9 kg (185 lb)   07/03/18 81.2 kg (179 lb 1.6 oz)              Today, you had the following     No orders found for display       Primary Care Provider Office Phone # Fax #    Baylee Fadia Lyle [phone] [address] [facility] [address]        Equal Access to Services     ISAAC CARTER : Fabian Warner, joseph jones, mati orta, gaurang carbajal. So [facility] [phone].    ATENCIÓN: Si habla español, tiene a judge disposición servicios gratuitos de asistencia lingüística. Llame al [phone].    We comply with applicable federal civil rights laws and [place] laws. We do not discriminate on the basis of race, color, national origin, age, disability, sex, sexual orientation, or gender identity.            Thank you!     Thank you for choosing [facility] AND [facility]  for your care. Our goal is always to provide you with excellent care. Hearing back from our patients is one way we can continue to improve our services. Please take a few minutes to complete the written survey that you may receive in the mail after your visit with us. Thank you!             Your Updated Medication List - Protect others around you: Learn how to safely use, store and throw away your medicines at www.disposemymeds.org.          This list is accurate as of 9/6/18  4:32 PM.  Always use your most recent med list.                   Brand Name Dispense Instructions for use Diagnosis    Prenatal Vitamins 0.8 MG Tabs     90 tablet    1 tab daily    Amenorrhea

## 2018-09-14 ENCOUNTER — PRENATAL OFFICE VISIT (OUTPATIENT)
Dept: OBGYN | Facility: OTHER | Age: 38
End: 2018-09-14
Attending: OBSTETRICS & GYNECOLOGY
Payer: COMMERCIAL

## 2018-09-14 VITALS
HEART RATE: 68 BPM | BODY MASS INDEX: 30.99 KG/M2 | WEIGHT: 192 LBS | SYSTOLIC BLOOD PRESSURE: 118 MMHG | DIASTOLIC BLOOD PRESSURE: 60 MMHG

## 2018-09-14 DIAGNOSIS — Z3A.33 33 WEEKS GESTATION OF PREGNANCY: Primary | ICD-10-CM

## 2018-09-14 DIAGNOSIS — O09.529 AMA (ADVANCED MATERNAL AGE) MULTIGRAVIDA 35+, UNSPECIFIED TRIMESTER: ICD-10-CM

## 2018-09-14 PROCEDURE — 99207 ZZC OB VISIT-NO CHARGE - GICH ONLY: CPT | Performed by: OBSTETRICS & GYNECOLOGY

## 2018-09-14 PROCEDURE — 59025 FETAL NON-STRESS TEST: CPT | Performed by: OBSTETRICS & GYNECOLOGY

## 2018-09-14 ASSESSMENT — PAIN SCALES - GENERAL: PAINLEVEL: NO PAIN (0)

## 2018-09-14 NOTE — NURSING NOTE
"Chief Complaint   Patient presents with     Prenatal Care     33W4D        Stress test started       Initial LMP  (LMP Unknown) Estimated body mass index is 30.72 kg/(m^2) as calculated from the following:    Height as of 5/8/18: 1.676 m (5' 6\").    Weight as of 9/6/18: 86.3 kg (190 lb 4.8 oz).  Medication Reconciliation: complete    Sasha Gustafson LPN  "

## 2018-09-14 NOTE — MR AVS SNAPSHOT
After Visit Summary   9/14/2018    Christine Moncada    MRN: [id number]           Patient Information     Date Of Birth          1980        Visit Information        Provider Department      9/14/2018 2:30 PM Tonny Sanchez MD [facility]        Today's Diagnoses     33 weeks gestation of pregnancy    -  1    AMA (advanced maternal age) multigravida 35+, unspecified trimester           Follow-ups after your visit        Your next 10 appointments already scheduled     Sep 18, 2018  3:30 PM CDT   ESTABLISHED PRENATAL with Bertrand Johnson MD   [facility]    [address]  [place] [address]   [phone]            Sep 28, 2018  3:30 PM CDT   ESTABLISHED PRENATAL with Tonny Sanchez MD   [facility])    [address]  [place] [address]   [phone]            Oct 02, 2018  3:45 PM CDT   ESTABLISHED PRENATAL with Bertrand Johnson MD   [facility]    [address]  [place] [address]   [phone]            Oct 08, 2018  3:15 PM CDT   ESTABLISHED PRENATAL with Bertrand Johnson MD   [facility]    [address]  [place] [address]   [phone]            Oct 18, 2018 10:45 AM CDT   ESTABLISHED PRENATAL with Bertrand Johnson MD   [facility]    [address]  [place] [address]   [phone]            Oct 23, 2018  3:15 PM CDT   ESTABLISHED PRENATAL with Bertrand Johnson MD   [facility] ([facility])    [address]  [place] [address]   [phone]            Oct 29, 2018  2:45 PM CDT   ESTABLISHED PRENATAL with Bertrand Johnson MD   [facility] ([facility])    [address]  [place] [address] [phone]              Who to contact     If you have questions or need follow up information about today's clinic visit or your schedule please contact [facility] directly at [phone].  Normal or non-critical lab and imaging results will be communicated to you by MyChart, letter or phone within 4 business days after the clinic has received the results. If you do not hear from us within 7 days, please contact the clinic through [employer] or phone. If you have a critical or abnormal lab result, we will notify you by phone as soon as possible.  Submit refill requests through [employer] or call your pharmacy and they will forward the refill request to us. Please allow 3 business days for your refill to be completed.          Additional Information About Your Visit        [employer] Information     [employer] lets you send messages to your doctor, view your test results, renew your prescriptions, schedule appointments and more. To sign up, go to [email or web address] . Click on \"Log in\" on the left side of the screen, which will take you to the Welcome page. Then click on \"Sign up Now\" on the right side of the page.     You will be asked to enter the access code listed below, as well as some personal information. Please follow the directions to create your username and password.     Your access code is: [email or web address]  Expires: 2018  3:53 PM     Your access code will  in 90 days. If you need help or a new code, please call your [place] clinic or [phone].        Care EveryWhere ID     This is your Care EveryWhere ID. This could be used by other organizations to access your [place] medical records  [id number]        Your Vitals Were     Pulse Last Period BMI (Body Mass Index)             68 (LMP Unknown) 30.99 kg/m2          Blood Pressure from Last 3 Encounters:   18 118/60   18 122/84 "   07/31/18 112/72    Weight from Last 3 Encounters:   09/14/18 87.1 kg (192 lb)   09/06/18 86.3 kg (190 lb 4.8 oz)   07/31/18 83.9 kg (185 lb)              We Performed the Following     FETAL NON-STRESS TEST     FETAL NON-STRESS TEST        Primary Care Provider Office Phone # Fax #    Baylee Fadia Lyle [phone]       [address] [facility] [address]        Equal Access to Services     JOSELO CARTER : Hadii aad ku hadasho Soomaali, waaxda luqadaha, qaybta kaalmada adeegyada, waxay kashifin hayabdoulaye childs . So [facility] [phone].    ATENCIÓN: Si habla español, tiene a judge disposición servicios gratuitos de asistencia lingüística. Llame al [phone].    We comply with applicable federal civil rights laws and [place] laws. We do not discriminate on the basis of race, color, national origin, age, disability, sex, sexual orientation, or gender identity.            Thank you!     Thank you for choosing [facility]  for your care. Our goal is always to provide you with excellent care. Hearing back from our patients is one way we can continue to improve our services. Please take a few minutes to complete the written survey that you may receive in the mail after your visit with us. Thank you!             Your Updated Medication List - Protect others around you: Learn how to safely use, store and throw away your medicines at www.disposemymeds.org.          This list is accurate as of 9/14/18  2:59 PM.  Always use your most recent med list.                   Brand Name Dispense Instructions for use Diagnosis    Prenatal Vitamins 0.8 MG Tabs     90 tablet    1 tab daily    Amenorrhea

## 2018-09-17 NOTE — PROGRESS NOTES
NST Performed  Indication: Advanced maternal age  Duration of NST (in minutes): 20  Baseline FHR: 140s  Accelerations (Reactive by gestational age criterion): yes  Decelerations: no  Category: I

## 2018-09-18 ENCOUNTER — PRENATAL OFFICE VISIT (OUTPATIENT)
Dept: OBGYN | Facility: OTHER | Age: 38
End: 2018-09-18
Attending: SURGERY
Payer: COMMERCIAL

## 2018-09-18 VITALS
SYSTOLIC BLOOD PRESSURE: 118 MMHG | BODY MASS INDEX: 31.07 KG/M2 | WEIGHT: 192.5 LBS | HEART RATE: 72 BPM | RESPIRATION RATE: 20 BRPM | DIASTOLIC BLOOD PRESSURE: 78 MMHG | TEMPERATURE: 98.1 F

## 2018-09-18 DIAGNOSIS — Z3A.34 34 WEEKS GESTATION OF PREGNANCY: ICD-10-CM

## 2018-09-18 DIAGNOSIS — O09.523 ELDERLY MULTIGRAVIDA IN THIRD TRIMESTER: Primary | ICD-10-CM

## 2018-09-18 PROCEDURE — 99207 ZZC OB VISIT-NO CHARGE - GICH ONLY: CPT | Performed by: OBSTETRICS & GYNECOLOGY

## 2018-09-18 PROCEDURE — 59025 FETAL NON-STRESS TEST: CPT | Performed by: OBSTETRICS & GYNECOLOGY

## 2018-09-18 ASSESSMENT — PAIN SCALES - GENERAL: PAINLEVEL: NO PAIN (0)

## 2018-09-18 NOTE — NURSING NOTE
"Patient presents today for her prenatal check-up. She is currently 34w1d.  Alana Vega LPN  9/18/2018  3:38 PM           Chief Complaint   Patient presents with     Prenatal Care     34w1d       Initial /78 (BP Location: Right arm, Patient Position: Sitting, Cuff Size: Adult Large)  Pulse 72  Temp 98.1  F (36.7  C) (Tympanic)  Resp 20  Wt 87.3 kg (192 lb 8 oz)  LMP  (LMP Unknown)  BMI 31.07 kg/m2 Estimated body mass index is 31.07 kg/(m^2) as calculated from the following:    Height as of 5/8/18: 1.676 m (5' 6\").    Weight as of this encounter: 87.3 kg (192 lb 8 oz).  Medication Reconciliation: complete    Alana Vega LPN    "

## 2018-09-18 NOTE — MR AVS SNAPSHOT
After Visit Summary   9/18/2018    Christine Moncada    MRN: [id number]           Patient Information     Date Of Birth          1980        Visit Information        Provider Department      9/18/2018 3:30 PM Tonny Sanchez MD [facility]        Today's Diagnoses     Elderly multigravida in third trimester    -  1    34 weeks gestation of pregnancy           Follow-ups after your visit        Your next 10 appointments already scheduled     Sep 28, 2018  3:30 PM CDT   [facility]    [address]  [place] [address]   [phone]            Oct 02, 2018  3:45 PM CDT   ESTABLISHED PRENATAL with [facility]   [facility]    [address]  [place] [address]   [phone]            Oct 08, 2018  3:15 PM CDT   ESTABLISHED PRENATAL with [facility] MD   [facility]    [address]  [facility] [address]   [phone]            Oct 18, 2018 10:45 AM CDT   ESTABLISHED PRENATAL with [facility]   [facility]    [address]  [place] [address]   [phone]            Oct 23, 2018  3:15 PM CDT   ESTABLISHED PRENATAL [facility]    [address]  [facility] [address]   [phone]            Oct 29, 2018  2:45 PM CDT   ESTABLISHED PRENATAL with [facility]   [facility]    [address]  [facility] [address]   [phone]              Who to contact     If you have questions or need follow up information about today's clinic visit or your schedule please contact [facility] directly at [phone].  Normal or non-critical lab and imaging results will be communicated to you by MyChart, letter or phone within 4 business days after the clinic has received the results. If you do not hear from us within 7 days, please contact the clinic through [employer] or phone. If you have a critical or abnormal lab result, we will notify you by phone as soon as possible.  Submit refill requests through [employer] or call your pharmacy and they will forward the refill request to us. Please allow 3 business days for your refill to be completed.          Additional Information About Your Visit        [employer] Information     [employer] lets you send messages to your doctor, view your test results, renew your prescriptions, schedule appointments and more. To sign up, go to [email or web address] . Click on \"Log in\" on the left side of the screen, which will take you to the Welcome page. Then click on \"Sign up Now\" on the right side of the page.     You will be asked to enter the access code listed below, as well as some personal information. Please follow the directions to create your username and password.     Your access code is: [email or web address]  Expires: 2018  3:53 PM     Your access code will  in 90 days. If you need help or a new code, please call your [place] clinic or [phone].        Care EveryWhere ID     This is your Care EveryWhere ID. This could be used by other organizations to access your [place] medical records  [id number]        Your Vitals Were     Pulse Temperature Respirations Last Period BMI (Body Mass Index)       72 98.1  F (36.7  C) (Tympanic) 20 (LMP Unknown) 31.07 kg/m2        Blood Pressure from Last 3 Encounters:   18 118/78   18 118/60   18 122/84    Weight from Last 3 Encounters:   18 87.3 kg (192 lb 8 oz)   18 87.1 kg (192 lb)   18 86.3 kg (190 lb 4.8 oz)              We Performed the Following     FETAL " NON-STRESS TEST        Primary Care Provider Office Phone # Fax #    Baylee Fadia Lyle [phone] [address]   [facility] [address]        Equal Access to Services     ISAAC CARTER : Hadii aad ku hadjosephsoha Sonickali, waaxda luqadaha, qaybta kaalmada adealessandrayada, gaurang kashifin hayaamichael umañakarripayton carbajal. So [facility] [phone].    ATENCIÓN: Si habla español, tiene a judge disposición servicios gratuitos de asistencia lingüística. Llame al [phone].    We comply with applicable federal civil rights laws and [place] laws. We do not discriminate on the basis of race, color, national origin, age, disability, sex, sexual orientation, or gender identity.            Thank you!     Thank you for choosing [facility]  for your care. Our goal is always to provide you with excellent care. Hearing back from our patients is one way we can continue to improve our services. Please take a few minutes to complete the written survey that you may receive in the mail after your visit with us. Thank you!             Your Updated Medication List - Protect others around you: Learn how to safely use, store and throw away your medicines at www.disposemymeds.org.          This list is accurate as of 9/18/18  4:27 PM.  Always use your most recent med list.                   Brand Name Dispense Instructions for use Diagnosis    Prenatal Vitamins 0.8 MG Tabs     90 tablet    1 tab daily    Amenorrhea

## 2018-09-18 NOTE — PROGRESS NOTES
Doing well. No concerns.    NST Performed Today:  Indication: AMA  Duration of NST (in minutes): 15  Baseline FHR: 120s  Accelerations (Reactive by gestational age criterion): yes  Decelerations: no  Category: I

## 2018-09-28 ENCOUNTER — PRENATAL OFFICE VISIT (OUTPATIENT)
Dept: OBGYN | Facility: OTHER | Age: 38
End: 2018-09-28
Attending: OBSTETRICS & GYNECOLOGY
Payer: COMMERCIAL

## 2018-09-28 VITALS
WEIGHT: 192 LBS | DIASTOLIC BLOOD PRESSURE: 68 MMHG | HEART RATE: 72 BPM | BODY MASS INDEX: 30.99 KG/M2 | SYSTOLIC BLOOD PRESSURE: 110 MMHG

## 2018-09-28 DIAGNOSIS — Z3A.35 35 WEEKS GESTATION OF PREGNANCY: Primary | ICD-10-CM

## 2018-09-28 PROCEDURE — 59025 FETAL NON-STRESS TEST: CPT | Performed by: OBSTETRICS & GYNECOLOGY

## 2018-09-28 PROCEDURE — 99207 ZZC OB VISIT-NO CHARGE - GICH ONLY: CPT | Performed by: OBSTETRICS & GYNECOLOGY

## 2018-09-28 PROCEDURE — 87081 CULTURE SCREEN ONLY: CPT | Performed by: OBSTETRICS & GYNECOLOGY

## 2018-09-28 ASSESSMENT — PAIN SCALES - GENERAL: PAINLEVEL: NO PAIN (0)

## 2018-09-28 NOTE — MR AVS SNAPSHOT
After Visit Summary   9/28/2018    Christine Moncada    MRN: [id number]           Patient Information     Date Of Birth          1980        Visit Information        Provider Department      9/28/2018 3:30 PM Tonny Sanchez MD [facility]        Today's Diagnoses     35 weeks gestation of pregnancy    -  1       Follow-ups after your visit        Your next 10 appointments already scheduled     Oct 02, 2018  3:45 PM CDT   ESTABLISHED PRENATAL with Bertrand Johnson MD   [facility]    [address]  [place] [address]   [phone]            Oct 08, 2018  3:15 PM CDT   ESTABLISHED PRENATAL with Bertrand Johnson MD   [facility]    [address]  [place] [address]   [phone]            Oct 18, 2018 10:45 AM CDT   ESTABLISHED PRENATAL with Bertrand Johnson MD   [facility]    [address]  [place] [address]   [phone]            Oct 23, 2018  3:15 PM CDT   ESTABLISHED PRENATAL with Bertrand Johnson MD   [facility] ([facility])    [address]  [place] [address]   [phone]            Oct 29, 2018  2:45 PM CDT   ESTABLISHED PRENATAL with Bertrand Johnson MD   [facility]    [address]  [place] [address]   [phone]              Who to contact     If you have questions or need follow up information about today's clinic visit or your schedule please contact [facility] directly at [phone].  Normal or non-critical lab and imaging results will be communicated to you by MyChart, letter or phone within 4 business days after the clinic has received the results. If you do not hear from us within 7 days, please  "contact the clinic through [employer] or phone. If you have a critical or abnormal lab result, we will notify you by phone as soon as possible.  Submit refill requests through [employer] or call your pharmacy and they will forward the refill request to us. Please allow 3 business days for your refill to be completed.          Additional Information About Your Visit        [employer] Information     [employer] lets you send messages to your doctor, view your test results, renew your prescriptions, schedule appointments and more. To sign up, go to [email or web address] . Click on \"Log in\" on the left side of the screen, which will take you to the Welcome page. Then click on \"Sign up Now\" on the right side of the page.     You will be asked to enter the access code listed below, as well as some personal information. Please follow the directions to create your username and password.     Your access code is: [email or web address]  Expires: 2018  3:53 PM     Your access code will  in 90 days. If you need help or a new code, please call your [place] clinic or [phone].        Care EveryWhere ID     This is your Care EveryWhere ID. This could be used by other organizations to access your [place] medical records  [id number]        Your Vitals Were     Pulse Last Period BMI (Body Mass Index)             72 (LMP Unknown) 30.99 kg/m2          Blood Pressure from Last 3 Encounters:   18 110/68   18 118/78   18 118/60    Weight from Last 3 Encounters:   18 87.1 kg (192 lb)   18 87.3 kg (192 lb 8 oz)   18 87.1 kg (192 lb)              We Performed the Following     FETAL NON-STRESS TEST        Primary Care Provider Office Phone # Fax #    Baylee Fadia Lyle [phone] [email or web address] [facility] [address]        Equal Access to Services     ISAAC CARTER AH: Fabian Warner, wahenri jones, mati orta, gaurang richter " krishna childs ah. So [facility] [phone].    ATENCIÓN: Si habla alexis, tiene a judge disposición servicios gratuitos de asistencia lingüística. Cory al [phone].    We comply with applicable federal civil rights laws and [place] laws. We do not discriminate on the basis of race, color, national origin, age, disability, sex, sexual orientation, or gender identity.            Thank you!     Thank you for choosing [facility]  for your care. Our goal is always to provide you with excellent care. Hearing back from our patients is one way we can continue to improve our services. Please take a few minutes to complete the written survey that you may receive in the mail after your visit with us. Thank you!             Your Updated Medication List - Protect others around you: Learn how to safely use, store and throw away your medicines at www.disposemymeds.org.          This list is accurate as of 9/28/18  3:51 PM.  Always use your most recent med list.                   Brand Name Dispense Instructions for use Diagnosis    Prenatal Vitamins 0.8 MG Tabs     90 tablet    1 tab daily    Amenorrhea

## 2018-09-28 NOTE — NURSING NOTE
"Chief Complaint   Patient presents with     Prenatal Care     35W4D      Did have some regular contractions last week but that has passed and is back to feeling normal.     Initial LMP  (LMP Unknown) Estimated body mass index is 31.07 kg/(m^2) as calculated from the following:    Height as of 5/8/18: 1.676 m (5' 6\").    Weight as of 9/18/18: 87.3 kg (192 lb 8 oz).  Medication Reconciliation: complete    Sasha Gustafson LPN  "

## 2018-09-28 NOTE — PROGRESS NOTES
Doing well  Had a few ctx earlier this week - non-reoccurring  GBS obtained (though previously positive)    NST Performed Today:  Indication: AMA  Duration of NST (in minutes): 20  Baseline FHR: 130s  Accelerations (Reactive by gestational age criterion): yes, infant very active during  Decelerations: none  Category: I

## 2018-09-30 LAB
BACTERIA SPEC CULT: NORMAL
SPECIMEN SOURCE: NORMAL

## 2018-10-02 ENCOUNTER — PRENATAL OFFICE VISIT (OUTPATIENT)
Dept: OBGYN | Facility: OTHER | Age: 38
End: 2018-10-02
Attending: SURGERY
Payer: COMMERCIAL

## 2018-10-02 VITALS
BODY MASS INDEX: 31.94 KG/M2 | HEART RATE: 74 BPM | DIASTOLIC BLOOD PRESSURE: 82 MMHG | SYSTOLIC BLOOD PRESSURE: 114 MMHG | WEIGHT: 197.9 LBS

## 2018-10-02 DIAGNOSIS — Z3A.36 36 WEEKS GESTATION OF PREGNANCY: ICD-10-CM

## 2018-10-02 DIAGNOSIS — Z23 FLU VACCINE NEED: Primary | ICD-10-CM

## 2018-10-02 PROCEDURE — 90686 IIV4 VACC NO PRSV 0.5 ML IM: CPT | Performed by: OBSTETRICS & GYNECOLOGY

## 2018-10-02 PROCEDURE — 90471 IMMUNIZATION ADMIN: CPT | Performed by: OBSTETRICS & GYNECOLOGY

## 2018-10-02 PROCEDURE — 99207 ZZC OB VISIT-NO CHARGE - GICH ONLY: CPT | Performed by: OBSTETRICS & GYNECOLOGY

## 2018-10-02 ASSESSMENT — PAIN SCALES - GENERAL: PAINLEVEL: NO PAIN (0)

## 2018-10-02 NOTE — PROGRESS NOTES
CC: Recheck OB visit at 36w1d    HPI: Christine Moncada presents for a routine OB visit now at 36w1d  She has no concerns. Denies cramping, bleeding, normal fetal movement. GBS neg but history of pos. Contractions have subsided.    Obstetric History       T2      L2     SAB1   TAB0   Ectopic0   Multiple0   Live Births2       # Outcome Date GA Lbr Yao/2nd Weight Sex Delivery Anes PTL Lv   4 Current            3 SAB            2 Term     M Vag-Vacuum   ANGELI   1 Term     M Vag-Vacuum   ANGELI      Complications: Excessive Vaginal Bleeding        Current Outpatient Prescriptions   Medication     Prenatal Multivit-Min-Fe-FA (PRENATAL VITAMINS) 0.8 MG TABS     No current facility-administered medications for this visit.          O: /82 (BP Location: Right arm)  Pulse 74  Wt 89.8 kg (197 lb 14.4 oz)  LMP  (LMP Unknown)  BMI 31.94 kg/m2  Body mass index is 31.94 kg/(m^2).  See OB flow sheet  EXAM:  NAD    NST doucmentation:    Indication: AMA  Duration: 0729-9151  36w1d  FHR baseline: 120  Accelerations: y  Decelerations: n  Contractions: y    Category 1        Results for orders placed or performed in visit on 18   Rectal/Vag Group B Strep Culture   Result Value Ref Range    Specimen Description Vaginal Rectal     Culture Micro No Group B Streptococcus isolated        A/P: 36w1d gesation    Problem List:   AMA- declines aneuploidy testing. (32 weeks NST's)  History of PPH, vacuum delivery x 2,   cardiac dysrhythmia- stable  Size>dates, 77th%ile 18  Hx of pos GBS, neg this pregnancy, patient prefers not to treat per CDC guidelines.    Bertrand Johnson MD FACOG  4:11 PM 10/2/2018

## 2018-10-02 NOTE — MR AVS SNAPSHOT
After Visit Summary   10/2/2018    Christine Moncada    MRN: [id number]           Patient Information     Date Of Birth          1980        Visit Information        Provider Department      10/2/2018 3:45 PM Bertrand Johnson MD [facility]        Today's Diagnoses     Flu vaccine need    -  1    36 weeks gestation of pregnancy           Follow-ups after your visit        Your next 10 appointments already scheduled     Oct 08, 2018  3:15 PM CDT   ESTABLISHED PRENATAL with Bertrand Johnson MD   [facility]    [address]  [facility] [address]   [phone]            Oct 18, 2018 10:45 AM CDT   ESTABLISHED PRENATAL with Bertrand Johnson MD   [facility] ([facility])    [address]  [place] [address]   [phone]            Oct 23, 2018  3:15 PM CDT   ESTABLISHED PRENATAL with Bertrand Johnson MD   [facility]    [address]  [facility] [address]   [phone]            Oct 29, 2018  2:45 PM CDT   ESTABLISHED PRENATAL with Bertrand Johnson MD   [facility]    [address]  [facility] [address]   [phone]              Who to contact     If you have questions or need follow up information about today's clinic visit or your schedule please contact [facility] directly at [phone].  Normal or non-critical lab and imaging results will be communicated to you by MyChart, letter or phone within 4 business days after the clinic has received the results. If you do not hear from us within 7 days, please contact the clinic through MyChart or phone. If you have a critical or abnormal lab result, we will notify you by phone as soon as possible.  Submit refill requests through [employer] or call your  "pharmacy and they will forward the refill request to us. Please allow 3 business days for your refill to be completed.          Additional Information About Your Visit        MyChart Information     [employer] lets you send messages to your doctor, view your test results, renew your prescriptions, schedule appointments and more. To sign up, go to [email or web address] . Click on \"Log in\" on the left side of the screen, which will take you to the Welcome page. Then click on \"Sign up Now\" on the right side of the page.     You will be asked to enter the access code listed below, as well as some personal information. Please follow the directions to create your username and password.     Your access code is: [email or web address]  Expires: 2018  3:53 PM     Your access code will  in 90 days. If you need help or a new code, please call your [place] clinic or [phone].        Care EveryWhere ID     This is your Care EveryWhere ID. This could be used by other organizations to access your [place] medical records  [id number]        Your Vitals Were     Pulse Last Period BMI (Body Mass Index)             74 (LMP Unknown) 31.94 kg/m2          Blood Pressure from Last 3 Encounters:   10/02/18 114/82   18 110/68   18 118/78    Weight from Last 3 Encounters:   10/02/18 89.8 kg (197 lb 14.4 oz)   18 87.1 kg (192 lb)   18 87.3 kg (192 lb 8 oz)              We Performed the Following     GH IMM-  HC FLU VAC PRESRV FREE QUAD SPLIT VIR 3+YRS IM        Primary Care Provider Office Phone # Fax #    Baylee Fadia Lyle [phone] [address]  [facility] [address]        Equal Access to Services     ISAAC CARTER : Fabian Warner, joseph jones, qaybta kaalmagaurang aviles. So [facility] [phone].    ATENCIÓN: Si habla español, tiene a judge disposición servicios gratuitos de asistencia lingüística. Llame al " [phone].    We comply with applicable federal civil rights laws and [place] laws. We do not discriminate on the basis of race, color, national origin, age, disability, sex, sexual orientation, or gender identity.            Thank you!     Thank you for choosing [facility] AND [facility]  for your care. Our goal is always to provide you with excellent care. Hearing back from our patients is one way we can continue to improve our services. Please take a few minutes to complete the written survey that you may receive in the mail after your visit with us. Thank you!             Your Updated Medication List - Protect others around you: Learn how to safely use, store and throw away your medicines at www.disposemymeds.org.          This list is accurate as of 10/2/18  4:23 PM.  Always use your most recent med list.                   Brand Name Dispense Instructions for use Diagnosis    Prenatal Vitamins 0.8 MG Tabs     90 tablet    1 tab daily    Amenorrhea

## 2018-10-02 NOTE — NURSING NOTE
"Chief Complaint   Patient presents with     Prenatal Care     36w1d       Initial /82 (BP Location: Right arm)  Pulse 74  Wt 89.8 kg (197 lb 14.4 oz)  LMP  (LMP Unknown)  BMI 31.94 kg/m2 Estimated body mass index is 31.94 kg/(m^2) as calculated from the following:    Height as of 5/8/18: 1.676 m (5' 6\").    Weight as of this encounter: 89.8 kg (197 lb 14.4 oz).  Medication Reconciliation: complete    Jerri Wakefield LPNLRUBIO............. 10/2/2018 3:52 PM     "

## 2018-10-08 ENCOUNTER — PRENATAL OFFICE VISIT (OUTPATIENT)
Dept: OBGYN | Facility: OTHER | Age: 38
End: 2018-10-08
Attending: OBSTETRICS & GYNECOLOGY
Payer: COMMERCIAL

## 2018-10-08 VITALS
SYSTOLIC BLOOD PRESSURE: 110 MMHG | BODY MASS INDEX: 31.73 KG/M2 | DIASTOLIC BLOOD PRESSURE: 76 MMHG | WEIGHT: 196.56 LBS | HEART RATE: 72 BPM

## 2018-10-08 DIAGNOSIS — O09.523 ELDERLY MULTIGRAVIDA IN THIRD TRIMESTER: Primary | ICD-10-CM

## 2018-10-08 PROCEDURE — 59025 FETAL NON-STRESS TEST: CPT | Performed by: OBSTETRICS & GYNECOLOGY

## 2018-10-08 PROCEDURE — 99207 ZZC OB VISIT-NO CHARGE - GICH ONLY: CPT | Performed by: OBSTETRICS & GYNECOLOGY

## 2018-10-08 ASSESSMENT — PAIN SCALES - GENERAL: PAINLEVEL: NO PAIN (0)

## 2018-10-08 NOTE — PROGRESS NOTES
CC: Recheck OB visit at 37w0d    HPI: Christine Moncada presents for a routine OB visit now at 37w0d  She has no concerns. Denies cramping, bleeding, normal fetal movement.     Obstetric History       T2      L2     SAB1   TAB0   Ectopic0   Multiple0   Live Births2       # Outcome Date GA Lbr Yao/2nd Weight Sex Delivery Anes PTL Lv   4 Current            3 SAB            2 Term     M Vag-Vacuum   ANGELI   1 Term     M Vag-Vacuum   ANGELI      Complications: Excessive Vaginal Bleeding        Current Outpatient Prescriptions   Medication     Prenatal Multivit-Min-Fe-FA (PRENATAL VITAMINS) 0.8 MG TABS     No current facility-administered medications for this visit.          O: /76 (BP Location: Right arm, Patient Position: Sitting, Cuff Size: Adult Large)  Pulse 72  Wt 89.2 kg (196 lb 9 oz)  LMP  (LMP Unknown)  BMI 31.73 kg/m2  Body mass index is 31.73 kg/(m^2).  See OB flow sheet  EXAM:  NAD    NST doucmentation:    Indication: (O09.523) Elderly multigravida in third trimester  (primary encounter diagnosis)  Comment:   Plan: FETAL NON-STRESS TEST            Duration: 3651-5069  37w0d  FHR baseline: 125  Accelerations: y  Decelerations: n  Contractions: n    Category 1        Results for orders placed or performed in visit on 18   Rectal/Vag Group B Strep Culture   Result Value Ref Range    Specimen Description Vaginal Rectal     Culture Micro No Group B Streptococcus isolated        A/P: (O09.523) Elderly multigravida in third trimester  (primary encounter diagnosis)  Comment:   Plan: FETAL NON-STRESS TEST              Recheck in 1 week    Problem List:   AMA- declines aneuploidy testing. (32 weeks NST's)  History of PPH, vacuum delivery x 2,   cardiac dysrhythmia- stable  Size>dates, 77th%ile 18  Hx of pos GBS, neg this pregnancy, patient prefers not to treat per CDC guidelines.    Bertrand Johnson MD FACOG  3:39 PM 10/8/2018

## 2018-10-08 NOTE — NURSING NOTE
Patient presents today for her prenatal check-up. She is currently 37w0d.  Alana Vega LPN  10/8/2018  3:19 PM

## 2018-10-08 NOTE — MR AVS SNAPSHOT
After Visit Summary   10/8/2018    Christine Moncada    MRN: [id number]           Patient Information     Date Of Birth          1980        Visit Information        Provider Department      10/8/2018 3:15 PM Bertrand Johnson MD [facility]        Today's Diagnoses     Elderly multigravida in third trimester    -  1       Follow-ups after your visit        Your next 10 appointments already scheduled     Oct 18, 2018 10:45 AM CDT   ESTABLISHED PRENATAL with Bertrand Johnson MD   [facility] ([facility]    [address]  [place] [address]   [phone]            Oct 23, 2018  3:15 PM CDT   ESTABLISHED PRENATAL with Bertrand Johnson MD   [facility] ([facility])    [address]  [place] [address]   [phone]            Oct 29, 2018  2:45 PM CDT   ESTABLISHED PRENATAL with Bertrand Johnson MD   [facility] ([facility])    [address]  [place] [address]   [phone]              Who to contact     If you have questions or need follow up information about today's clinic visit or your schedule please contact [facility] directly at [phone].  Normal or non-critical lab and imaging results will be communicated to you by AOLhart, letter or phone within 4 business days after the clinic has received the results. If you do not hear from us within 7 days, please contact the clinic through PPT Reasearcht or phone. If you have a critical or abnormal lab result, we will notify you by phone as soon as possible.  Submit refill requests through [employer] or call your pharmacy and they will forward the refill request to us. Please allow 3 business days for your refill to be completed.          Additional Information About Your Visit        AOLharAppMyDay Information     [employer] lets you send messages to your  "doctor, view your test results, renew your prescriptions, schedule appointments and more. To sign up, go to [email or web address] . Click on \"Log in\" on the left side of the screen, which will take you to the Welcome page. Then click on \"Sign up Now\" on the right side of the page.     You will be asked to enter the access code listed below, as well as some personal information. Please follow the directions to create your username and password.     Your access code is: [email or web address]  Expires: 2018  3:53 PM     Your access code will  in 90 days. If you need help or a new code, please call your [place] clinic or [phone].        Care EveryWhere ID     This is your Care EveryWhere ID. This could be used by other organizations to access your [place] medical records  [id number]        Your Vitals Were     Pulse Last Period BMI (Body Mass Index)             72 (LMP Unknown) 31.73 kg/m2          Blood Pressure from Last 3 Encounters:   10/08/18 110/76   10/02/18 114/82   18 110/68    Weight from Last 3 Encounters:   10/08/18 89.2 kg (196 lb 9 oz)   10/02/18 89.8 kg (197 lb 14.4 oz)   18 87.1 kg (192 lb)              We Performed the Following     FETAL NON-STRESS TEST        Primary Care Provider Office Phone # Fax #    Baylee Fadia Lyle [phone]       [address] [facility] [address]        Equal Access to Services     [facility] : Hadii milton delvalle Sopaulo, waaxda luqadaha, qaybta kaalmastaci orta, gaurang childs . So [facility] [phone].    ATENCIÓN: Si habla español, tiene a judge disposición servicios gratuitos de asistencia lingüística. Llame al [phone].    We comply with applicable federal civil rights laws and [place] laws. We do not discriminate on the basis of race, color, national origin, age, disability, sex, sexual orientation, or gender identity.            Thank you!     Thank you for choosing [facility] [place]  for your care. Our goal is always to provide you with excellent care. Hearing back from our patients is one way we can continue to improve our services. Please take a few minutes to complete the written survey that you may receive in the mail after your visit with us. Thank you!             Your Updated Medication List - Protect others around you: Learn how to safely use, store and throw away your medicines at www.disposemymeds.org.          This list is accurate as of 10/8/18  3:44 PM.  Always use your most recent med list.                   Brand Name Dispense Instructions for use Diagnosis    Prenatal Vitamins 0.8 MG Tabs     90 tablet    1 tab daily    Amenorrhea

## 2018-10-18 ENCOUNTER — PRENATAL OFFICE VISIT (OUTPATIENT)
Dept: OBGYN | Facility: OTHER | Age: 38
End: 2018-10-18
Attending: OBSTETRICS & GYNECOLOGY
Payer: COMMERCIAL

## 2018-10-18 VITALS
BODY MASS INDEX: 31.65 KG/M2 | WEIGHT: 196.06 LBS | SYSTOLIC BLOOD PRESSURE: 94 MMHG | HEART RATE: 60 BPM | DIASTOLIC BLOOD PRESSURE: 64 MMHG

## 2018-10-18 DIAGNOSIS — O09.523 ELDERLY MULTIGRAVIDA IN THIRD TRIMESTER: Primary | ICD-10-CM

## 2018-10-18 PROCEDURE — 99207 ZZC OB VISIT-NO CHARGE - GICH ONLY: CPT | Performed by: OBSTETRICS & GYNECOLOGY

## 2018-10-18 PROCEDURE — 59025 FETAL NON-STRESS TEST: CPT | Performed by: OBSTETRICS & GYNECOLOGY

## 2018-10-18 ASSESSMENT — PAIN SCALES - GENERAL: PAINLEVEL: NO PAIN (0)

## 2018-10-18 NOTE — MR AVS SNAPSHOT
"              After Visit Summary   10/18/2018    Christine Moncada    MRN: [id number]           Patient Information     Date Of Birth          1980        Visit Information        Provider Department      10/18/2018 10:45 AM Bertrand Johnson MD [facility]        Today's Diagnoses     Elderly multigravida in third trimester    -  1       Follow-ups after your visit        Your next 10 appointments already scheduled     Oct 23, 2018  3:15 PM CDT   ESTABLISHED PRENATAL with Bertrand Johnson MD   [facility]    [address]  [place] [address]   [phone]            Oct 29, 2018  2:45 PM CDT   ESTABLISHED PRENATAL with Bertrand Johnson MD   [facility] ([facility])    [address]  [place] [address] [phone]              Who to contact     If you have questions or need follow up information about today's clinic visit or your schedule please contact [facility] directly at [phone].  Normal or non-critical lab and imaging results will be communicated to you by MalÃ³ Clinichart, letter or phone within 4 business days after the clinic has received the results. If you do not hear from us within 7 days, please contact the clinic through [employer]t or phone. If you have a critical or abnormal lab result, we will notify you by phone as soon as possible.  Submit refill requests through Rif[employer]i or call your pharmacy and they will forward the refill request to us. Please allow 3 business days for your refill to be completed.          Additional Information About Your Visit        MalÃ³ ClinicharAdvanced-Tec Information     Rif[employer]i lets you send messages to your doctor, view your test results, renew your prescriptions, schedule appointments and more. To sign up, go to [email or web address] . Click on \"Log in\" on the left side of the screen, which will take you to the Welcome " "page. Then click on \"Sign up Now\" on the right side of the page.     You will be asked to enter the access code listed below, as well as some personal information. Please follow the directions to create your username and password.     Your access code is: [email or web address]  Expires: 2018  3:53 PM     Your access code will  in 90 days. If you need help or a new code, please call your [place] clinic or [phone].        Care EveryWhere ID     This is your Care EveryWhere ID. This could be used by other organizations to access your [place] medical records  [id number]        Your Vitals Were     Pulse Last Period BMI (Body Mass Index)             60 (LMP Unknown) 31.65 kg/m2          Blood Pressure from Last 3 Encounters:   10/18/18 94/64   10/08/18 110/76   10/02/18 114/82    Weight from Last 3 Encounters:   10/18/18 88.9 kg (196 lb 1 oz)   10/08/18 89.2 kg (196 lb 9 oz)   10/02/18 89.8 kg (197 lb 14.4 oz)              We Performed the Following     FETAL NON-STRESS TEST        Primary Care Provider Office Phone # Fax #    Baylee Fadia MD [place] [phone] [address] [facility] [address]        Equal Access to Services     JOSELO CARTER : Hadii milton rocha hadasho Soomaali, waaxda luqadaha, qaybta kaalmada marivel, gaurang carbajal. So [facility] [phone].    ATENCIÓN: Si habla español, tiene a judge disposición servicios gratuitos de asistencia lingüística. Llame al [phone].    We comply with applicable federal civil rights laws and [place] laws. We do not discriminate on the basis of race, color, national origin, age, disability, sex, sexual orientation, or gender identity.            Thank you!     Thank you for choosing [facility]  for your care. Our goal is always to provide you with excellent care. Hearing back from our patients is one way we can continue to improve our services. Please take a few minutes to complete " the written survey that you may receive in the mail after your visit with us. Thank you!             Your Updated Medication List - Protect others around you: Learn how to safely use, store and throw away your medicines at www.disposemymeds.org.          This list is accurate as of 10/18/18 11:33 AM.  Always use your most recent med list.                   Brand Name Dispense Instructions for use Diagnosis    Prenatal Vitamins 0.8 MG Tabs     90 tablet    1 tab daily    Amenorrhea

## 2018-10-18 NOTE — PROGRESS NOTES
CC: Recheck OB visit at 38w3d    HPI: Christine Moncada presents for a routine OB visit now at 38w3d  She has no concerns. Denies cramping, bleeding, normal fetal movement. Felt presyncopal with sudden position change a few times this week. It passed spontaneously with rest. No concerns today.    Obstetric History       T2      L2     SAB1   TAB0   Ectopic0   Multiple0   Live Births2       # Outcome Date GA Lbr Yao/2nd Weight Sex Delivery Anes PTL Lv   4 Current            3 SAB            2 Term     M Vag-Vacuum   ANGELI   1 Term     M Vag-Vacuum   ANGELI      Complications: Excessive Vaginal Bleeding        Current Outpatient Prescriptions   Medication     Prenatal Multivit-Min-Fe-FA (PRENATAL VITAMINS) 0.8 MG TABS     No current facility-administered medications for this visit.          O: BP 94/64 (BP Location: Right arm, Patient Position: Sitting, Cuff Size: Adult Regular)  Pulse 60  Wt 88.9 kg (196 lb 1 oz)  LMP  (LMP Unknown)  BMI 31.65 kg/m2  Body mass index is 31.65 kg/(m^2).  See OB flow sheet  EXAM:  NAD    NST doucmentation:    Indication: AMA  Duration:0487-2831  38w3d  FHR baseline: 120  Accelerations: y  Decelerations: n  Contractions: n    Category 1    Bertrand Johnson MD FACOG  11:30 AM 10/18/2018         Results for orders placed or performed in visit on 18   Rectal/Vag Group B Strep Culture   Result Value Ref Range    Specimen Description Vaginal Rectal     Culture Micro No Group B Streptococcus isolated        A/P: 38w3d    Recheck in 1 weeks    Problem List:   AMA- declines aneuploidy testing. (32 weeks NST's)  History of PPH, vacuum delivery x 2,   cardiac dysrhythmia- stable  Size>dates, 77th%ile 18  Hx of pos GBS, neg this pregnancy, patient prefers not to treat per CDC guidelines.    Bertrand Johnson MD FACOG  11:30 AM 10/18/2018

## 2018-10-18 NOTE — NURSING NOTE
Patient presents today for her prenatal check-up. She is currently 38w3d.  Alana Vega LPN  10/18/2018  10:53 AM

## 2018-10-23 ENCOUNTER — PRENATAL OFFICE VISIT (OUTPATIENT)
Dept: OBGYN | Facility: OTHER | Age: 38
End: 2018-10-23
Attending: OBSTETRICS & GYNECOLOGY
Payer: COMMERCIAL

## 2018-10-23 VITALS
SYSTOLIC BLOOD PRESSURE: 110 MMHG | DIASTOLIC BLOOD PRESSURE: 74 MMHG | BODY MASS INDEX: 32.14 KG/M2 | WEIGHT: 199.13 LBS | HEART RATE: 68 BPM

## 2018-10-23 DIAGNOSIS — O09.523 ELDERLY MULTIGRAVIDA IN THIRD TRIMESTER: Primary | ICD-10-CM

## 2018-10-23 PROCEDURE — 59025 FETAL NON-STRESS TEST: CPT | Performed by: OBSTETRICS & GYNECOLOGY

## 2018-10-23 PROCEDURE — 99207 ZZC OB VISIT-NO CHARGE - GICH ONLY: CPT | Performed by: OBSTETRICS & GYNECOLOGY

## 2018-10-23 ASSESSMENT — PAIN SCALES - GENERAL: PAINLEVEL: NO PAIN (0)

## 2018-10-23 NOTE — PROGRESS NOTES
CC: Recheck OB visit at 39w1d    HPI: Christine Moncada presents for a routine OB visit now at 39w1d  She has no concerns. Denies cramping, bleeding, normal fetal movement.    Obstetric History       T2      L2     SAB1   TAB0   Ectopic0   Multiple0   Live Births2       # Outcome Date GA Lbr Yao/2nd Weight Sex Delivery Anes PTL Lv   4 Current            3 SAB            2 Term     M Vag-Vacuum   ANGELI   1 Term     M Vag-Vacuum   ANGELI      Complications: Excessive Vaginal Bleeding        Current Outpatient Prescriptions   Medication     Prenatal Multivit-Min-Fe-FA (PRENATAL VITAMINS) 0.8 MG TABS     No current facility-administered medications for this visit.          O: /74 (BP Location: Right arm, Patient Position: Sitting, Cuff Size: Adult Regular)  Pulse 68  Wt 90.3 kg (199 lb 2 oz)  LMP  (LMP Unknown)  BMI 32.14 kg/m2  Body mass index is 32.14 kg/(m^2).  See OB flow sheet  EXAM:  NAD  Declines pelvic exam.    NST doucmentation:    Indication: (O09.523) Elderly multigravida in third trimester  (primary encounter diagnosis)  Comment:   Plan: FETAL NON-STRESS TEST            Duration: 7215-5069  39w1d    FHR baseline: 115 bpm  Accelerations: y  Decelerations: n  Contractions: n    Category 1    Results for orders placed or performed in visit on 18   Rectal/Vag Group B Strep Culture   Result Value Ref Range    Specimen Description Vaginal Rectal     Culture Micro No Group B Streptococcus isolated        A/P: (O09.523) Elderly multigravida in third trimester  (primary encounter diagnosis)  Comment:   Plan: FETAL NON-STRESS TEST              Recheck in 1 week    Discussed induction of labor versus watchful waiting and known risks of fetal demise with advanced maternal age weight in pregnancy.  We will continue  testing until she goes into labor or 41 weeks gestation, at that time she would be willing to undergo induction of labor.    Problem list:  AMA- declines aneuploidy  testing. (32 weeks NST's)  History of PPH, vacuum delivery x 2,   cardiac dysrhythmia- stable  Size>dates, 77th%ile 7/31/18  Hx of pos GBS, neg this pregnancy, patient prefers not to treat per CDC guidelines.    Bertrand Johnson MD FACOG  4:38 PM 10/23/2018

## 2018-10-23 NOTE — MR AVS SNAPSHOT
"              After Visit Summary   10/23/2018    Christine Moncada    MRN: [id number]           Patient Information     Date Of Birth          1980        Visit Information        Provider Department      10/23/2018 3:15 PM Bertrand Johnson MD [facility]        Today's Diagnoses     Elderly multigravida in third trimester    -  1       Follow-ups after your visit        Your next 10 appointments already scheduled     Oct 29, 2018  2:45 PM CDT   ESTABL[facility] PRENATAL with Bertrand Johnson MD   [facility]    [address]  [place] [address] [phone]            Nov 05, 2018  2:30 PM CST   ESTABLISHED PRENATAL with Bertrand Johnson MD   [facility]    [address]  [place] [id number] [phone]              Who to contact     If you have questions or need follow up information about today's clinic visit or your schedule please contact [facility] directly at [phone].  Normal or non-critical lab and imaging results will be communicated to you by Qwalyticshart, letter or phone within 4 business days after the clinic has received the results. If you do not hear from us within 7 days, please contact the clinic through Rock-It Cargot or phone. If you have a critical or abnormal lab result, we will notify you by phone as soon as possible.  Submit refill requests through Findery or call your pharmacy and they will forward the refill request to us. Please allow 3 business days for your refill to be completed.          Additional Information About Your Visit        QwalyticsharBiometryCloud Information     Findery lets you send messages to your doctor, view your test results, renew your prescriptions, schedule appointments and more. To sign up, go to [email or web address] . Click on \"Log in\" on the left side of the screen, which will take you to the Welcome " "page. Then click on \"Sign up Now\" on the right side of the page.     You will be asked to enter the access code listed below, as well as some personal information. Please follow the directions to create your username and password.     Your access code is: [email or web address]  Expires: 2018  3:53 PM     Your access code will  in 90 days. If you need help or a new code, please call your [place] clinic or [phone].        Care EveryWhere ID     This is your Care EveryWhere ID. This could be used by other organizations to access your [place] medical records  [id number]        Your Vitals Were     Pulse Last Period BMI (Body Mass Index)             68 (LMP Unknown) 32.14 kg/m2          Blood Pressure from Last 3 Encounters:   10/23/18 110/74   10/18/18 94/64   10/08/18 110/76    Weight from Last 3 Encounters:   10/23/18 90.3 kg (199 lb 2 oz)   10/18/18 88.9 kg (196 lb 1 oz)   10/08/18 89.2 kg (196 lb 9 oz)              Today, you had the following     No orders found for display       Primary Care Provider Office Phone # Fax #    Baylee Fadia MD [place] [phone]       [address] [facility] [address]        Equal Access to Services     [facility] : Hadii milton rocha hadasho Soomaali, waaxda luqadaha, qaybta kaalmada marivel, gaurang carbajal. So [facility] [phone].    ATENCIÓN: Si habla español, tiene a judge disposición servicios gratuitos de asistencia lingüística. Llame al [phone].    We comply with applicable federal civil rights laws and [place] laws. We do not discriminate on the basis of race, color, national origin, age, disability, sex, sexual orientation, or gender identity.            Thank you!     Thank you for choosing [facility] AND [facility]  for your care. Our goal is always to provide you with excellent care. Hearing back from our patients is one way we can continue to improve our services. Please take a few minutes to " complete the written survey that you may receive in the mail after your visit with us. Thank you!             Your Updated Medication List - Protect others around you: Learn how to safely use, store and throw away your medicines at www.disposemymeds.org.          This list is accurate as of 10/23/18  3:50 PM.  Always use your most recent med list.                   Brand Name Dispense Instructions for use Diagnosis    Prenatal Vitamins 0.8 MG Tabs     90 tablet    1 tab daily    Amenorrhea

## 2018-10-23 NOTE — NURSING NOTE
Patient presents today for her prenatal check-up. She is currently 39w1d.  Alana Vega LPN  10/23/2018  3:21 PM

## 2018-10-29 ENCOUNTER — PRENATAL OFFICE VISIT (OUTPATIENT)
Dept: OBGYN | Facility: OTHER | Age: 38
End: 2018-10-29
Attending: OBSTETRICS & GYNECOLOGY
Payer: COMMERCIAL

## 2018-10-29 VITALS
WEIGHT: 200.13 LBS | HEART RATE: 84 BPM | BODY MASS INDEX: 32.3 KG/M2 | DIASTOLIC BLOOD PRESSURE: 78 MMHG | SYSTOLIC BLOOD PRESSURE: 114 MMHG

## 2018-10-29 DIAGNOSIS — O09.523 ELDERLY MULTIGRAVIDA IN THIRD TRIMESTER: Primary | ICD-10-CM

## 2018-10-29 PROCEDURE — 99207 ZZC OB VISIT-NO CHARGE - GICH ONLY: CPT | Performed by: OBSTETRICS & GYNECOLOGY

## 2018-10-29 PROCEDURE — 59025 FETAL NON-STRESS TEST: CPT | Performed by: OBSTETRICS & GYNECOLOGY

## 2018-10-29 ASSESSMENT — PAIN SCALES - GENERAL: PAINLEVEL: NO PAIN (0)

## 2018-10-29 NOTE — MR AVS SNAPSHOT
"              After Visit Summary   10/29/2018    Christine Moncada    MRN: [id number]           Patient Information     Date Of Birth          1980        Visit Information        Provider Department      10/29/2018 2:45 PM Bertrand Johnson MD [facility]        Today's Diagnoses     Elderly multigravida in third trimester    -  1       Follow-ups after your visit        Your next 10 appointments already scheduled     Nov 05, 2018  2:30 PM CST   ESTABLISHED PRENATAL with Bertrand Johnson MD   [facility]    [address]  [place] [address] [phone]              Who to contact     If you have questions or need follow up information about today's clinic visit or your schedule please contact [facility] directly at [phone].  Normal or non-critical lab and imaging results will be communicated to you by Triplejump Grouphart, letter or phone within 4 business days after the clinic has received the results. If you do not hear from us within 7 days, please contact the clinic through Triplejump Grouphart or phone. If you have a critical or abnormal lab result, we will notify you by phone as soon as possible.  Submit refill requests through [email or web address] or call your pharmacy and they will forward the refill request to us. Please allow 3 business days for your refill to be completed.          Additional Information About Your Visit        MyChart Information     [email or web address] lets you send messages to your doctor, view your test results, renew your prescriptions, schedule appointments and more. To sign up, go to [email or web address] . Click on \"Log in\" on the left side of the screen, which will take you to the Welcome page. Then click on \"Sign up Now\" on the right side of the page.     You will be asked to enter the access code listed below, as well as some personal information. Please follow the directions to create your username and " password.     Your access code is: [email or web address]  Expires: 2018  3:53 PM     Your access code will  in 90 days. If you need help or a new code, please call your [place] clinic or [phone].        Care EveryWhere ID     This is your Care EveryWhere ID. This could be used by other organizations to access your [place] medical records  [id number]        Your Vitals Were     Pulse Last Period BMI (Body Mass Index)             84 (LMP Unknown) 32.3 kg/m2          Blood Pressure from Last 3 Encounters:   10/29/18 114/78   10/23/18 110/74   10/18/18 94/64    Weight from Last 3 Encounters:   10/29/18 90.8 kg (200 lb 2 oz)   10/23/18 90.3 kg (199 lb 2 oz)   10/18/18 88.9 kg (196 lb 1 oz)              We Performed the Following     FETAL NON-STRESS TEST        Primary Care Provider Office Phone # Fax #    Baylee Fadia Lyle [phone]       [address] [facility] [address]        Equal Access to Services     [facility]: Hadii milton ku hadasho Soomaali, waaxda luqadaha, qaybta kaalmada adestacy, gaurang childs . So [facility] [phone].    ATENCIÓN: Si habla español, tiene a judge disposición servicios gratuitos de asistencia lingüística. Llame al [phone].    We comply with applicable federal civil rights laws and [place] laws. We do not discriminate on the basis of race, color, national origin, age, disability, sex, sexual orientation, or gender identity.            Thank you!     Thank you for choosing [facility] AND [facility]  for your care. Our goal is always to provide you with excellent care. Hearing back from our patients is one way we can continue to improve our services. Please take a few minutes to complete the written survey that you may receive in the mail after your visit with us. Thank you!             Your Updated Medication List - Protect others around you: Learn how to safely use, store and throw away your medicines at  www.disposemymeds.org.          This list is accurate as of 10/29/18  5:07 PM.  Always use your most recent med list.                   Brand Name Dispense Instructions for use Diagnosis    Prenatal Vitamins 0.8 MG Tabs     90 tablet    1 tab daily    Amenorrhea

## 2018-10-29 NOTE — PROGRESS NOTES
CC: Recheck OB visit at 40w0d    HPI: Christine Moncada presents for a routine OB visit now at 40w0d  She has no concerns. Denies cramping, bleeding, normal fetal movement.    Obstetric History       T2      L2     SAB1   TAB0   Ectopic0   Multiple0   Live Births2       # Outcome Date GA Lbr Yao/2nd Weight Sex Delivery Anes PTL Lv   4 Current            3 SAB            2 Term     M Vag-Vacuum   ANGELI   1 Term     M Vag-Vacuum   ANGELI      Complications: Excessive Vaginal Bleeding        Current Outpatient Prescriptions   Medication     Prenatal Multivit-Min-Fe-FA (PRENATAL VITAMINS) 0.8 MG TABS     No current facility-administered medications for this visit.          O: /78 (BP Location: Right arm, Patient Position: Sitting, Cuff Size: Adult Regular)  Pulse 84  Wt 90.8 kg (200 lb 2 oz)  LMP  (LMP Unknown)  BMI 32.3 kg/m2  Body mass index is 32.3 kg/(m^2).  See OB flow sheet  EXAM:  NAD    NST doucmentation:    Indication: (O09.523) Elderly multigravida in third trimester  (primary encounter diagnosis)  Comment:   Plan: FETAL NON-STRESS TEST            Duration:30 min  40w0d  FHR baseline:120  Accelerations: y  Decelerations: n  Contractions: n    Category 1    Bedside US shows normal AFV and fetal movement  EFW 8lbs 12 oz on biometry.      Results for orders placed or performed in visit on 18   Rectal/Vag Group B Strep Culture   Result Value Ref Range    Specimen Description Vaginal Rectal     Culture Micro No Group B Streptococcus isolated        A/P: (O09.523) Elderly multigravida in third trimester  (primary encounter diagnosis)  Comment:         Recheck in 1 week  Induction at 41 weeks if no labor.    Problem List:   AMA- declines aneuploidy testing. (32 weeks NST's)  History of PPH, vacuum delivery x 2,   cardiac dysrhythmia- stable  Size>dates, 77th%ile 18  Hx of pos GBS, neg this pregnancy, patient prefers not to treat per CDC guidelines.    Bertrand Johnson MD FACOG  3:11 PM  10/29/2018

## 2018-10-29 NOTE — NURSING NOTE
Patient presents today for her prenatal check-up. She is currently 40w0d..  Alana Vega LPN  10/29/2018  2:35 PM

## 2018-11-05 ENCOUNTER — PRENATAL OFFICE VISIT (OUTPATIENT)
Dept: OBGYN | Facility: OTHER | Age: 38
End: 2018-11-05
Attending: OBSTETRICS & GYNECOLOGY
Payer: COMMERCIAL

## 2018-11-05 VITALS
WEIGHT: 200 LBS | BODY MASS INDEX: 32.28 KG/M2 | HEART RATE: 78 BPM | SYSTOLIC BLOOD PRESSURE: 122 MMHG | DIASTOLIC BLOOD PRESSURE: 80 MMHG

## 2018-11-05 DIAGNOSIS — O09.523 ELDERLY MULTIGRAVIDA IN THIRD TRIMESTER: ICD-10-CM

## 2018-11-05 DIAGNOSIS — O48.0 41 WEEKS GESTATION OF PREGNANCY: Primary | ICD-10-CM

## 2018-11-05 DIAGNOSIS — Z3A.41 41 WEEKS GESTATION OF PREGNANCY: Primary | ICD-10-CM

## 2018-11-05 PROCEDURE — 59025 FETAL NON-STRESS TEST: CPT | Performed by: OBSTETRICS & GYNECOLOGY

## 2018-11-05 PROCEDURE — 99207 ZZC OB VISIT-NO CHARGE - GICH ONLY: CPT | Performed by: OBSTETRICS & GYNECOLOGY

## 2018-11-05 ASSESSMENT — PAIN SCALES - GENERAL: PAINLEVEL: NO PAIN (0)

## 2018-11-05 NOTE — PROGRESS NOTES
CC: Recheck OB visit at 41w0d    HPI: Christine Moncada presents for a routine OB visit now at 41w0d  She has no concerns. Denies cramping, bleeding, normal fetal movement    Obstetric History       T2      L2     SAB1   TAB0   Ectopic0   Multiple0   Live Births2       # Outcome Date GA Lbr Yao/2nd Weight Sex Delivery Anes PTL Lv   4 Current            3 SAB            2 Term     M Vag-Vacuum   ANGELI   1 Term     M Vag-Vacuum   ANGELI      Complications: Excessive Vaginal Bleeding        Current Outpatient Prescriptions   Medication     Prenatal Multivit-Min-Fe-FA (PRENATAL VITAMINS) 0.8 MG TABS     No current facility-administered medications for this visit.          O: /80 (BP Location: Right arm)  Pulse 78  Wt 90.7 kg (200 lb)  LMP  (LMP Unknown)  BMI 32.28 kg/m2  Body mass index is 32.28 kg/(m^2).  See OB flow sheet  EXAM:  NAD  Cx 3-4/80/0  Normal JENNY    NST doucmentation:    Indication: Post term  Duration: 41w0d  FHR baseline: 130  Accelerations: y  Decelerations: n  Contractions: n    Category 1        Results for orders placed or performed in visit on 18   Rectal/Vag Group B Strep Culture   Result Value Ref Range    Specimen Description Vaginal Rectal     Culture Micro No Group B Streptococcus isolated        A/P: 41w0d gestation    Induction of labor this week.    Problem List:   AMA- declines aneuploidy testing. (32 weeks NST's)  History of PPH, vacuum delivery x 2,   cardiac dysrhythmia- stable  Size>dates, 77th%ile 18  Hx of pos GBS, neg this pregnancy, patient prefers not to treat per CDC guidelines.    Bertrand Johnson MD FACOG  4:33 PM 2018

## 2018-11-05 NOTE — MR AVS SNAPSHOT
"              After Visit Summary   2018    Christine Moncada    MRN: [id number]           Patient Information     Date Of Birth          1980        Visit Information        Provider Department      2018 2:30 PM Bertrand Johnson MD [facility]        Today's Diagnoses     41 weeks gestation of pregnancy    -  1    Elderly multigravida in third trimester           Follow-ups after your visit        Who to contact     If you have questions or need follow up information about today's clinic visit or your schedule please contact [facility] [place] [facility] directly at [phone].  Normal or non-critical lab and imaging results will be communicated to you by Rapamycin Holdingshart, letter or phone within 4 business days after the clinic has received the results. If you do not hear from us within 7 days, please contact the clinic through FastPayt or phone. If you have a critical or abnormal lab result, we will notify you by phone as soon as possible.  Submit refill requests through [employer] or call your pharmacy and they will forward the refill request to us. Please allow 3 business days for your refill to be completed.          Additional Information About Your Visit        MyChart Information     [employer] lets you send messages to your doctor, view your test results, renew your prescriptions, schedule appointments and more. To sign up, go to [email or web address] [facility] [email or web address] . Click on \"Log in\" on the left side of the screen, which will take you to the Welcome page. Then click on \"Sign up Now\" on the right side of the page.     You will be asked to enter the access code listed below, as well as some personal information. Please follow the directions to create your username and password.     Your access code is: [email or web address]  Expires: 2018  2:53 PM     Your access code will  in 90 days. If you need help or a new code, please call your [place] clinic or [phone].        Care EveryWhere ID  "    This is your Care EveryWhere ID. This could be used by other organizations to access your [place] medical records  [id number]        Your Vitals Were     Pulse Last Period BMI (Body Mass Index)             78 (LMP Unknown) 32.28 kg/m2          Blood Pressure from Last 3 Encounters:   11/05/18 122/80   10/29/18 114/78   10/23/18 110/74    Weight from Last 3 Encounters:   11/05/18 90.7 kg (200 lb)   10/29/18 90.8 kg (200 lb 2 oz)   10/23/18 90.3 kg (199 lb 2 oz)              We Performed the Following     FETAL NON-STRESS TEST        Primary Care Provider Office Phone # Fax #    Baylee Fadia Lyle [phone]       [address] [facility] [address]        Equal Access to Services     ISAAC CARTER : Hadii milton delvalle Sopaulo, waaxda luqadaha, qaybta kaalmastaci orta, gaurang childs . So [facility] [phone].    ATENCIÓN: Si habla español, tiene a judge disposición servicios gratuitos de asistencia lingüística. Cory al [phone].    We comply with applicable federal civil rights laws and [place] laws. We do not discriminate on the basis of race, color, national origin, age, disability, sex, sexual orientation, or gender identity.            Thank you!     Thank you for choosing [facility] AND [facility]  for your care. Our goal is always to provide you with excellent care. Hearing back from our patients is one way we can continue to improve our services. Please take a few minutes to complete the written survey that you may receive in the mail after your visit with us. Thank you!             Your Updated Medication List - Protect others around you: Learn how to safely use, store and throw away your medicines at www.disposemymeds.org.          This list is accurate as of 11/5/18  4:37 PM.  Always use your most recent med list.                   Brand Name Dispense Instructions for use Diagnosis    Prenatal Vitamins 0.8 MG Tabs     90 tablet     1 tab daily    Amenorrhea

## 2018-11-07 ENCOUNTER — ANESTHESIA EVENT (OUTPATIENT)
Dept: OBGYN | Facility: OTHER | Age: 38
End: 2018-11-07
Payer: COMMERCIAL

## 2018-11-07 ENCOUNTER — ANESTHESIA (OUTPATIENT)
Dept: OBGYN | Facility: OTHER | Age: 38
End: 2018-11-07
Payer: COMMERCIAL

## 2018-11-07 ENCOUNTER — HOSPITAL ENCOUNTER (INPATIENT)
Facility: OTHER | Age: 38
LOS: 2 days | Discharge: HOME | End: 2018-11-09
Attending: OBSTETRICS & GYNECOLOGY | Admitting: OBSTETRICS & GYNECOLOGY
Payer: COMMERCIAL

## 2018-11-07 PROBLEM — Z34.90 ENCOUNTER FOR PLANNED INDUCTION OF LABOR: Status: ACTIVE | Noted: 2018-11-07

## 2018-11-07 LAB
ABO + RH BLD: NORMAL
ABO + RH BLD: NORMAL
SPECIMEN EXP DATE BLD: NORMAL

## 2018-11-07 PROCEDURE — 72200001 ZZH LABOR CARE VAGINAL DELIVERY SINGLE

## 2018-11-07 PROCEDURE — 37000011 ZZH ANESTHESIA WARD SERVICE

## 2018-11-07 PROCEDURE — 59400 OBSTETRICAL CARE: CPT | Performed by: NURSE ANESTHETIST, CERTIFIED REGISTERED

## 2018-11-07 PROCEDURE — 59400 OBSTETRICAL CARE: CPT | Performed by: OBSTETRICS & GYNECOLOGY

## 2018-11-07 PROCEDURE — 40000275 ZZH STATISTIC RCP TIME EA 10 MIN

## 2018-11-07 PROCEDURE — 86900 BLOOD TYPING SEROLOGIC ABO: CPT | Performed by: OBSTETRICS & GYNECOLOGY

## 2018-11-07 PROCEDURE — 25000132 ZZH RX MED GY IP 250 OP 250 PS 637: Performed by: NURSE ANESTHETIST, CERTIFIED REGISTERED

## 2018-11-07 PROCEDURE — 0KQM0ZZ REPAIR PERINEUM MUSCLE, OPEN APPROACH: ICD-10-PCS | Performed by: OBSTETRICS & GYNECOLOGY

## 2018-11-07 PROCEDURE — 12000027 ZZH R&B OB

## 2018-11-07 PROCEDURE — 25000128 H RX IP 250 OP 636: Performed by: NURSE ANESTHETIST, CERTIFIED REGISTERED

## 2018-11-07 PROCEDURE — 3E033VJ INTRODUCTION OF OTHER HORMONE INTO PERIPHERAL VEIN, PERCUTANEOUS APPROACH: ICD-10-PCS | Performed by: OBSTETRICS & GYNECOLOGY

## 2018-11-07 PROCEDURE — 86901 BLOOD TYPING SEROLOGIC RH(D): CPT | Performed by: OBSTETRICS & GYNECOLOGY

## 2018-11-07 PROCEDURE — 86780 TREPONEMA PALLIDUM: CPT | Performed by: OBSTETRICS & GYNECOLOGY

## 2018-11-07 PROCEDURE — 25000128 H RX IP 250 OP 636: Performed by: OBSTETRICS & GYNECOLOGY

## 2018-11-07 PROCEDURE — 36415 COLL VENOUS BLD VENIPUNCTURE: CPT | Performed by: OBSTETRICS & GYNECOLOGY

## 2018-11-07 PROCEDURE — 25000125 ZZHC RX 250: Performed by: OBSTETRICS & GYNECOLOGY

## 2018-11-07 RX ORDER — FENTANYL CITRATE 50 UG/ML
100 INJECTION, SOLUTION INTRAMUSCULAR; INTRAVENOUS
Status: DISCONTINUED | OUTPATIENT
Start: 2018-11-07 | End: 2018-11-09 | Stop reason: HOSPADM

## 2018-11-07 RX ORDER — DOCUSATE SODIUM 100 MG/1
100 CAPSULE, LIQUID FILLED ORAL 2 TIMES DAILY
Status: DISCONTINUED | OUTPATIENT
Start: 2018-11-07 | End: 2018-11-09 | Stop reason: HOSPADM

## 2018-11-07 RX ORDER — NALOXONE HYDROCHLORIDE 0.4 MG/ML
.1-.4 INJECTION, SOLUTION INTRAMUSCULAR; INTRAVENOUS; SUBCUTANEOUS
Status: DISCONTINUED | OUTPATIENT
Start: 2018-11-07 | End: 2018-11-09 | Stop reason: HOSPADM

## 2018-11-07 RX ORDER — BISACODYL 10 MG
10 SUPPOSITORY, RECTAL RECTAL DAILY PRN
Status: DISCONTINUED | OUTPATIENT
Start: 2018-11-09 | End: 2018-11-09 | Stop reason: HOSPADM

## 2018-11-07 RX ORDER — IBUPROFEN 400 MG/1
800 TABLET, FILM COATED ORAL
Status: DISCONTINUED | OUTPATIENT
Start: 2018-11-07 | End: 2018-11-09 | Stop reason: HOSPADM

## 2018-11-07 RX ORDER — FENTANYL CITRATE 50 UG/ML
25 INJECTION, SOLUTION INTRAMUSCULAR; INTRAVENOUS ONCE
Status: COMPLETED | OUTPATIENT
Start: 2018-11-07 | End: 2018-11-07

## 2018-11-07 RX ORDER — BUPIVACAINE HYDROCHLORIDE 7.5 MG/ML
INJECTION, SOLUTION INTRASPINAL PRN
Status: DISCONTINUED | OUTPATIENT
Start: 2018-11-07 | End: 2018-11-08

## 2018-11-07 RX ORDER — HYDROCORTISONE 2.5 %
CREAM (GRAM) TOPICAL 3 TIMES DAILY PRN
Status: DISCONTINUED | OUTPATIENT
Start: 2018-11-07 | End: 2018-11-09 | Stop reason: HOSPADM

## 2018-11-07 RX ORDER — SODIUM CHLORIDE, SODIUM LACTATE, POTASSIUM CHLORIDE, CALCIUM CHLORIDE 600; 310; 30; 20 MG/100ML; MG/100ML; MG/100ML; MG/100ML
INJECTION, SOLUTION INTRAVENOUS CONTINUOUS
Status: DISCONTINUED | OUTPATIENT
Start: 2018-11-07 | End: 2018-11-09 | Stop reason: HOSPADM

## 2018-11-07 RX ORDER — LIDOCAINE 40 MG/G
CREAM TOPICAL
Status: DISCONTINUED | OUTPATIENT
Start: 2018-11-07 | End: 2018-11-07

## 2018-11-07 RX ORDER — ACETAMINOPHEN 325 MG/1
650 TABLET ORAL EVERY 4 HOURS PRN
Status: DISCONTINUED | OUTPATIENT
Start: 2018-11-07 | End: 2018-11-09 | Stop reason: HOSPADM

## 2018-11-07 RX ORDER — OXYTOCIN 10 [USP'U]/ML
10 INJECTION, SOLUTION INTRAMUSCULAR; INTRAVENOUS
Status: DISCONTINUED | OUTPATIENT
Start: 2018-11-07 | End: 2018-11-09 | Stop reason: HOSPADM

## 2018-11-07 RX ORDER — ONDANSETRON 2 MG/ML
4 INJECTION INTRAMUSCULAR; INTRAVENOUS EVERY 6 HOURS PRN
Status: DISCONTINUED | OUTPATIENT
Start: 2018-11-07 | End: 2018-11-07

## 2018-11-07 RX ORDER — LIDOCAINE 40 MG/G
CREAM TOPICAL
Status: DISCONTINUED | OUTPATIENT
Start: 2018-11-07 | End: 2018-11-09 | Stop reason: HOSPADM

## 2018-11-07 RX ORDER — ONDANSETRON 2 MG/ML
4 INJECTION INTRAMUSCULAR; INTRAVENOUS EVERY 6 HOURS PRN
Status: DISCONTINUED | OUTPATIENT
Start: 2018-11-07 | End: 2018-11-09 | Stop reason: HOSPADM

## 2018-11-07 RX ORDER — NALBUPHINE HYDROCHLORIDE 10 MG/ML
2.5-5 INJECTION, SOLUTION INTRAMUSCULAR; INTRAVENOUS; SUBCUTANEOUS EVERY 6 HOURS PRN
Status: DISCONTINUED | OUTPATIENT
Start: 2018-11-07 | End: 2018-11-09 | Stop reason: HOSPADM

## 2018-11-07 RX ORDER — NALOXONE HYDROCHLORIDE 0.4 MG/ML
.1-.4 INJECTION, SOLUTION INTRAMUSCULAR; INTRAVENOUS; SUBCUTANEOUS
Status: DISCONTINUED | OUTPATIENT
Start: 2018-11-07 | End: 2018-11-07

## 2018-11-07 RX ORDER — CITRIC ACID/SODIUM CITRATE 334-500MG
30 SOLUTION, ORAL ORAL ONCE
Status: COMPLETED | OUTPATIENT
Start: 2018-11-07 | End: 2018-11-07

## 2018-11-07 RX ORDER — OXYCODONE AND ACETAMINOPHEN 5; 325 MG/1; MG/1
1 TABLET ORAL
Status: DISCONTINUED | OUTPATIENT
Start: 2018-11-07 | End: 2018-11-09 | Stop reason: HOSPADM

## 2018-11-07 RX ORDER — PHENYLEPHRINE HCL IN 0.9% NACL 1 MG/10 ML
100 SYRINGE (ML) INTRAVENOUS EVERY 5 MIN PRN
Status: DISCONTINUED | OUTPATIENT
Start: 2018-11-07 | End: 2018-11-09 | Stop reason: HOSPADM

## 2018-11-07 RX ORDER — CARBOPROST TROMETHAMINE 250 UG/ML
250 INJECTION, SOLUTION INTRAMUSCULAR
Status: DISCONTINUED | OUTPATIENT
Start: 2018-11-07 | End: 2018-11-09 | Stop reason: HOSPADM

## 2018-11-07 RX ORDER — METHYLERGONOVINE MALEATE 0.2 MG/ML
200 INJECTION INTRAVENOUS
Status: DISCONTINUED | OUTPATIENT
Start: 2018-11-07 | End: 2018-11-09 | Stop reason: HOSPADM

## 2018-11-07 RX ORDER — LANOLIN 100 %
OINTMENT (GRAM) TOPICAL
Status: DISCONTINUED | OUTPATIENT
Start: 2018-11-07 | End: 2018-11-09 | Stop reason: HOSPADM

## 2018-11-07 RX ORDER — IBUPROFEN 400 MG/1
800 TABLET, FILM COATED ORAL EVERY 6 HOURS PRN
Status: DISCONTINUED | OUTPATIENT
Start: 2018-11-07 | End: 2018-11-09 | Stop reason: HOSPADM

## 2018-11-07 RX ORDER — MORPHINE SULFATE 1 MG/ML
100 INJECTION, SOLUTION EPIDURAL; INTRATHECAL; INTRAVENOUS ONCE
Status: COMPLETED | OUTPATIENT
Start: 2018-11-07 | End: 2018-11-07

## 2018-11-07 RX ORDER — MISOPROSTOL 100 UG/1
800 TABLET ORAL
Status: DISCONTINUED | OUTPATIENT
Start: 2018-11-07 | End: 2018-11-09 | Stop reason: HOSPADM

## 2018-11-07 RX ADMIN — MORPHINE SULFATE 0.1 MG: 1 INJECTION, SOLUTION EPIDURAL; INTRATHECAL; INTRAVENOUS at 12:25

## 2018-11-07 RX ADMIN — LIDOCAINE HYDROCHLORIDE 3 ML: 10 INJECTION, SOLUTION EPIDURAL; INFILTRATION; INTRACAUDAL; PERINEURAL at 12:15

## 2018-11-07 RX ADMIN — SODIUM CHLORIDE, SODIUM LACTATE, POTASSIUM CHLORIDE, AND CALCIUM CHLORIDE: 600; 310; 30; 20 INJECTION, SOLUTION INTRAVENOUS at 12:44

## 2018-11-07 RX ADMIN — SODIUM CHLORIDE, SODIUM LACTATE, POTASSIUM CHLORIDE, AND CALCIUM CHLORIDE 1000 ML: 600; 310; 30; 20 INJECTION, SOLUTION INTRAVENOUS at 11:24

## 2018-11-07 RX ADMIN — FENTANYL CITRATE 25 MCG: 50 INJECTION, SOLUTION INTRAMUSCULAR; INTRAVENOUS at 12:25

## 2018-11-07 RX ADMIN — BUPIVACAINE HYDROCHLORIDE IN DEXTROSE 0.5 ML: 7.5 INJECTION, SOLUTION SUBARACHNOID at 12:25

## 2018-11-07 RX ADMIN — OXYTOCIN 2 MILLI-UNITS/MIN: 10 INJECTION INTRAVENOUS at 07:43

## 2018-11-07 RX ADMIN — LIDOCAINE HYDROCHLORIDE 1 ML: 10 INJECTION, SOLUTION EPIDURAL; INFILTRATION; INTRACAUDAL; PERINEURAL at 12:21

## 2018-11-07 RX ADMIN — SODIUM CITRATE AND CITRIC ACID MONOHYDRATE 30 ML: 500; 334 SOLUTION ORAL at 12:11

## 2018-11-07 ASSESSMENT — ACTIVITIES OF DAILY LIVING (ADL)
TRANSFERRING: 0 - INDEPENDENT
RETIRED_EATING: 0-->INDEPENDENT
SWALLOWING: 0-->SWALLOWS FOODS/LIQUIDS WITHOUT DIFFICULTY
SWALLOWING: 0 - SWALLOWS FOODS/LIQUIDS WITHOUT DIFFICULTY
COMMUNICATION: 0 - UNDERSTANDS/COMMUNICATES WITHOUT DIFFICULTY
COGNITION: 0 - NO COGNITION ISSUES REPORTED
EATING: 0 - INDEPENDENT
TRANSFERRING: 0-->INDEPENDENT
RETIRED_COMMUNICATION: 0-->UNDERSTANDS/COMMUNICATES WITHOUT DIFFICULTY
TOILETING: 0-->INDEPENDENT
AMBULATION: 0 - INDEPENDENT
TOILETING: 0 - INDEPENDENT
DRESS: 0 - INDEPENDENT
FALL_HISTORY_WITHIN_LAST_SIX_MONTHS: NO
AMBULATION: 0-->INDEPENDENT
DRESS: 0-->INDEPENDENT
BATHING: 0-->INDEPENDENT
BATHING: 0 - INDEPENDENT

## 2018-11-07 NOTE — H&P
[facility]    History and Physical  Obstetrics and Gynecology     Date of Admission:  2018    Assessment & Plan   Christine Moncada is a 38 year old female who presents with postterm pregnancy.  ASSESSMENT:   IUP @ 41w2d for induction of labor.  Indication postterm.  NST reactive.  Category  I    PLAN:   Admit - see IP orders    Bertrand Johnson    History of Present Illness   Christine Moncada is a 38 year old female  41w2d  Estimated Date of Delivery: Oct 29, 2018 is calculated from No LMP recorded (lmp unknown). Patient is pregnant. [facility]  for [facility] of labor.  Indication for postterm pregnancy.    PRENATAL COURSE  Prenatal course was essentially uncomplicated      Recent Labs   Lab Test  18   1644   ABO  O   RH  Pos   AS  Neg     Rhogam not indicated   Recent Labs   Lab Test  18   1643   HEPBANG  Nonreactive   HIAGAB  Nonreactive   RUQIGG  83       Past Medical History    I have reviewed this patient's medical history and updated it with pertinent information if needed.   Past Medical History:   Diagnosis Date     Encounter for full-term uncomplicated delivery     , VAVD x2     Joint disorder     2014     Major depressive disorder, single episode     remote hx     Other idiopathic scoliosis, site unspecified     in childhood     Personal history of other diseases of the nervous system and sense organs     ,abdominal, quiescent       Past Surgical History   I have reviewed this patient's surgical history and updated it with pertinent information if needed.  Past Surgical History:   Procedure Laterality Date     OTHER SURGICAL HISTORY      ,,OTHER       Prior to Admission Medications   Prior to Admission Medications   Prescriptions Last Dose Informant Patient Reported? Taking?   Prenatal Multivit-Min-Fe-FA (PRENATAL VITAMINS) 0.8 MG TABS   No No   Si tab daily      Facility-Administered Medications: None      Allergies   Allergies   Allergen Reactions     Pollen Extract      Other reaction(s): Runny Nose  Sneezy, itchy eyes     Amoxicillin Rash       Social History   I have reviewed this patient's social history and updated it with pertinent information if needed. Christine Arevalo MOHINI GreenMoncada  reports that she has never smoked. She has never used smokeless tobacco. She reports that she does not drink alcohol or use illicit drugs.    Family History   I have reviewed this patient's family history and updated it with pertinent information if needed.   Family History   Problem Relation Age of Onset     Other - See Comments Mother       due to brother's large size       Immunization History   Immunizations are up to date    Physical Exam                      Vital Signs with Ranges       Abdomen: gravid, single vertex fetus, non-tender, EFW 7 lbs 12  Cervical Exam: 4/ 70/ Mid/ soft/ -1     Fetal Heart Tones: 130 baseline, moderate variablility, + accels, no decels and Category I  TOCO:   none    Constitutional: healthy, alert, active and no distress   Respiratory: No increased work of breathing, good air exchange, clear to auscultation bilaterally, no crackles or wheezing  Cardiovascular: Normal apical impulse, regular rate and rhythm, normal S1 and S2, no S3 or S4, and no murmur noted

## 2018-11-07 NOTE — ANESTHESIA PROCEDURE NOTES
Peripheral nerve/Neuraxial procedure note : intrathecal  Pre-Procedure  Performed by  OCTAVIO SERRANO   Location: OB    Procedure Times:11/7/2018 12:13 PM and 11/7/2018 12:25 PM  Pre-Anesthestic Checklist: patient identified, IV checked, risks and benefits discussed, informed consent, monitors and equipment checked, pre-op evaluation, at physician/surgeon's request and post-op pain management    Timeout  Correct Patient: Yes   Correct Procedure: Yes   Correct Site: Yes   Correct Laterality: N/A   Correct Position: Yes   Site Marked: N/A   .   Procedure Documentation  ASA 2  Diagnosis:Labor Pain.    Procedure:    Intrathecal.  Insertion Site:L3-4  (midline approach)      Patient Prep;chlorhexidine gluconate and isopropyl alcohol.  .  Needle: Carlos tip Spinal Needle (gauge): 27  Spinal/LP Needle Length (inches): 3.5 # of attempts: 2 and  # of redirects:  1 Introducer used Introducer: 20 G .       Assessment/Narrative  Paresthesias: No.  .  .  clear CSF fluid removed . Time Injected: 12:25

## 2018-11-07 NOTE — PLAN OF CARE
Problem: Labor (Cervical Ripen, Induct, Augment) (Adult,Obstetrics,Pediatric)  Goal: Signs and Symptoms of Listed Potential Problems Will be Absent, Minimized or Managed (Labor)  Signs and symptoms of listed potential problems will be absent, minimized or managed by discharge/transition of care (reference Labor (Cervical Ripen, Induct, Augment) (Adult,Obstetrics,Pediatric) CPG).  Outcome: No Change  Suzi here for induction of labor at 41w 2d. Oriented to room 405. External fetal monitors applied. Will notify Dr. Johnson of patient arrival and status.    Georgina Forbes, RN, IBCLC

## 2018-11-07 NOTE — L&D DELIVERY NOTE
OB Vaginal Delivery Note    Christine Moncada MRN# [id number]   Age: 38 year old YOB: 1980       GA: 41w2d  GP:   Labor Complications: None   EBL: 400  mL  QBL:    Delivery Type: Vaginal, Spontaneous Delivery   ROM to Delivery Time: rupture date, rupture time, delivery date, or delivery time have not been documented  Biddle Weight:      1 Minute 5 Minute 10 Minute   Apgar Totals:                     Delivery Details:  Christine Moncada, a 38 year old  female delivered a viable infant with apgars of    and   . Patient was fully dilated and pushing after    hours    minutes in active labor. Delivery was via vaginal, spontaneous delivery  to a sterile field under    anesthesia. Infant delivered in             position. Anterior and posterior shoulders delivered without difficulty. The cord was clamped, cut twice and    were noted. Cord blood was obtained in routine fashion with the following disposition:   .      Cord complications:     Placenta delivered at   . Placental disposition was Hospital disposal . Fundal massage performed and fundus found to be firm.     Episiotomy: none    Perineum, vagina, cervix were inspected, and the following lacerations were noted:   Perineal lacerations: 2nd                     Any lacerations were repaired in the usual fashion using 2-0 Vicryl.    Excellent hemostasis was noted. Needle count correct. Infant and patient in delivery room in good and stable condition.         Labor Event Times    Labor onset date:  18    Dilation complete date:  18             Labor Events     labor?:  No    steroids:  None   Labor Type:  Induction   Predominate monitoring during 1st stage:  continuous electronic fetal monitoring      Antibiotics received during labor?:  No      Rupture identifier:  Rupture 1   Rupture date/time: 18          Induction date/time:     Cervical ripening date/time:     Indications for induction:  Post-term  Gestation      1:1 continuous labor support provided by?:  RN          Delivery/Placenta Date and Time    Delivery Date:  11/7/18       Vaginal Counts    Initial count performed by 2 team members:   Two Team Members   aries and Shey POLANCO          Needles Suture Slovan Sponges Instruments   Initial counts 1 0 6    Added to count       Final counts          Placed during labor Accounted for at the end of labor               Labor Events and Shoulder Dystocia    Fetal Tracing Prior to Delivery:  Category 1   Shoulder dystocia present?:  Neg            Delivery (Maternal) (Provider to Complete) (587554)    Episiotomy:  None   Perineal lacerations:  2nd Repaired?:  Yes   Est. blood loss (mL):  400         Mother's Information  Mother: Giovanny Moncadadaljit Arevalo V #[id number]    Start of Mother's Information     IO Blood Loss  11/07/18 0000 - 11/07/18 1329    Mom's I/O Activity            End of Mother's Information  Mother: Christine Moncada Jo V #[id number]            Delivery - Provider to Complete (286683)    Delivering clinician:  NADIA JOHNSON   Attempted Delivery Types (Choose all that apply):  Spontaneous Vaginal Delivery   Delivery Type (Choose the 1 that will go to the Birth History):  Vaginal, Spontaneous Delivery                           Placenta    Delayed Cord Clamping:  Done   Removal:  Spontaneous   Disposition:  Hospital disposal                 Nadia oJhnson MD

## 2018-11-07 NOTE — PROGRESS NOTES
Out of bed, ambulated to bathroom. Voided. Tatianna cares completed and clean linens applied. Returned to bed and resting. Room full of family. Family very supportive.

## 2018-11-07 NOTE — PROGRESS NOTES
Baby to breast x 2, baby latches well and nursing going quite well. Lactation nurse Aisha here to observe breast feeding and educate mother.

## 2018-11-07 NOTE — PROGRESS NOTES
Diamante Tsang placed as patient wants to ambulating during Induction labor. Baseline . Contractions tracing.

## 2018-11-07 NOTE — ANESTHESIA PREPROCEDURE EVALUATION
Anesthesia Evaluation       history and physical reviewed . Pt has had prior anesthetic. Type: Regional    No history of anesthetic complications          ROS/MED HX    ENT/Pulmonary:  - neg pulmonary ROS     Neurologic:  - neg neurologic ROS     Cardiovascular:  - neg cardiovascular ROS       METS/Exercise Tolerance:  >4 METS   Hematologic:  - neg hematologic  ROS       Musculoskeletal:  - neg musculoskeletal ROS       GI/Hepatic:  - neg GI/hepatic ROS       Renal/Genitourinary:  - ROS Renal section negative       Endo:  - neg endo ROS       Psychiatric:  - neg psychiatric ROS       Infectious Disease:  - neg infectious disease ROS       Malignancy:      - no malignancy   Other:    - neg other ROS                 Physical Exam  Normal systems: cardiovascular, pulmonary and dental    Airway   Mallampati: II  TM distance: >3 FB  Neck ROM: full    Dental     Cardiovascular   Rhythm and rate: regular and normal      Pulmonary    breath sounds clear to auscultation          neg OB ROS                 Anesthesia Plan      History & Physical Review      ASA Status:  2 .  OB Epidural Asa: 2   NPO Status:  > 6 hours         Postoperative Care      Consents  Anesthetic plan, risks, benefits and alternatives discussed with:  Patient and Patient..                          .

## 2018-11-07 NOTE — IP AVS SNAPSHOT
[facility]    [address]    [place] [address]    Phone:  [phone]    Fax:  [phone]                                       After Visit Summary   11/7/2018    Christine Moncada    MRN: [id number]           After Visit Summary Signature Page     I have received my discharge instructions, and my questions have been answered. I have discussed any challenges I see with this plan with the nurse or doctor.    ..........................................................................................................................................  Patient/Patient Representative Signature      ..........................................................................................................................................  Patient Representative Print Name and Relationship to Patient    ..................................................               ................................................  Date                                   Time    ..........................................................................................................................................  Reviewed by Signature/Title    ...................................................              ..............................................  Date                                               Time          22EPIC Rev 08/18

## 2018-11-07 NOTE — LACTATION NOTE
This note was copied from a baby's chart.  INPATIENT LACTATION CONSULT      Consult with Colt Arevalo and sarah beth regarding breastfeeding.  Obvious rooting with a strong latch observed this feeding session.  Rhythmic and aggressive suckling also noted.  Instructed Colt Arevalo on correct positioning and technique when latching babe on.  Colt Arevalo is independent with latching babe onto breast.  Minimal assistance required.  Encouraged Colt Arevalo on the importance of frequent feedings throughout the day (at least 8-12 feedings in a 24 hour period) and skin to skin contact.  Colt Arevalo demonstrated and states she understands all information given.    Aisha Brennan RN, IBCLC  Lactation Consultant  [facility]

## 2018-11-07 NOTE — IP AVS SNAPSHOT
MRN:[id number]                      After Visit Summary   2018    Christine Moncada    MRN: [id number]           Thank you!     Thank you for choosing [facility] for your care. Our goal is always to provide you with excellent care. Hearing back from our patients is one way we can continue to improve our services. Please take a few minutes to complete the written survey that you may receive in the mail after you visit with us. Thank you!        Patient Information     Date Of Birth          1980        Designated Caregiver       Most Recent Value    Caregiver    Will someone help with your care after discharge? no      About your hospital stay     You were admitted on:  2018 You last received care in the:  [facility]    You were discharged on:  2018        Reason for your hospital stay       Maternity care                  Who to Call     For medical emergencies, please call 911.  For non-urgent questions about your medical care, please call your primary care provider or clinic, [phone]          Attending Provider     Provider Specialty    Bertrand Johnson MD OB/Gyn       Primary Care Provider Office Phone # Fax #    Baylee Fadia Lyle [phone]      After Care Instructions     Activity       Review discharge instructions            Diet       Resume previous diet            Discharge Instructions - Postpartum visit       Schedule postpartum visit with your provider and return to clinic in 6 weeks. Also 2 weeks if a  section was done.                  Follow-up Appointments     Follow Up and recommended labs and tests       Follow up with Bertrand nuñez, within 6 weeks. for hospital follow- up.  No follow up labs or test are needed.                  Your next 10 appointments already scheduled     Dec 20, 2018  8:30 AM CST   Post Partum with Bertrand Johnson MD   [facility]  "[facility])    [address]  [place] [address] [phone]              Pending Results     No orders found from 2018 to 2018.            Statement of Approval     Ordered          18 0856  I have reviewed and agree with all the recommendations and orders detailed in this document.  EFFECTIVE NOW     Approved and electronically signed by:  Bertrand Johnson MD             Admission Information     Date & Time Provider Department Dept. Phone    2018 Bertrand Johnson MD [facility] [phone]      Your Vitals Were     Blood Pressure Pulse Temperature Respirations Last Period Pulse Oximetry    112/69 90 97.3  F (36.3  C) (Temporal) 16 (LMP Unknown) 96%      MyChart Information     [facility] lets you send messages to your doctor, view your test results, renew your prescriptions, schedule appointments and more. To sign up, go to [email or web address] . Click on \"Log in\" on the left side of the screen, which will take you to the Welcome page. Then click on \"Sign up Now\" on the right side of the page.     You will be asked to enter the access code listed below, as well as some personal information. Please follow the directions to create your username and password.     Your access code is: [email or web address]  Expires: 2018  2:53 PM     Your access code will  in 90 days. If you need help or a new code, please call your [facility] clinic or [phone].        Care EveryWhere ID     This is your Care EveryWhere ID. This could be used by other organizations to access your [facility] medical records  [id number]        Equal Access to Services     ISAAC CARTER : Hadjunaid Warner, joseph jones, qagaurang brunner. So [facility] [phone].    ATENCIÓN: Si habla español, tiene a judge disposición servicios gratuitos de asistencia lingüística. Llame al [phone].    We comply with applicable " federal civil rights laws and [place] laws. We do not discriminate on the basis of race, color, national origin, age, disability, sex, sexual orientation, or gender identity.               Review of your medicines      START taking        Dose / Directions    docusate sodium 100 MG capsule   Commonly known as:  COLACE        Dose:  100 mg   Take 1 capsule (100 mg) by mouth 2 times daily as needed for constipation   Quantity:  20 capsule   Refills:  0         CONTINUE these medicines which have NOT CHANGED        Dose / Directions    Prenatal Vitamins 0.8 MG Tabs   Used for:  Amenorrhea        1 tab daily   Quantity:  90 tablet   Refills:  3            Where to get your medicines      These medications were sent to [facility] - [place], [place] - [address]  [address] [facility] [address]     Phone:  [phone]     docusate sodium 100 MG capsule                Protect others around you: Learn how to safely use, store and throw away your medicines at www.disposemymeds.org.             Medication List: This is a list of all your medications and when to take them. Check marks below indicate your daily home schedule. Keep this list as a reference.      Medications           Morning Afternoon Evening Bedtime As Needed    docusate sodium 100 MG capsule   Commonly known as:  COLACE   Take 1 capsule (100 mg) by mouth 2 times daily as needed for constipation   Last time this was given:  100 mg on 11/8/2018  9:36 PM                                Prenatal Vitamins 0.8 MG Tabs   1 tab daily

## 2018-11-08 LAB
HGB BLD-MCNC: 13 G/DL (ref 11.7–15.7)
T PALLIDUM AB SER QL: NONREACTIVE

## 2018-11-08 PROCEDURE — 12000027 ZZH R&B OB

## 2018-11-08 PROCEDURE — 36415 COLL VENOUS BLD VENIPUNCTURE: CPT | Performed by: OBSTETRICS & GYNECOLOGY

## 2018-11-08 PROCEDURE — 25000132 ZZH RX MED GY IP 250 OP 250 PS 637: Performed by: OBSTETRICS & GYNECOLOGY

## 2018-11-08 PROCEDURE — 99207 ZZC NO CHARGE LOS: CPT | Performed by: OBSTETRICS & GYNECOLOGY

## 2018-11-08 PROCEDURE — 85018 HEMOGLOBIN: CPT | Performed by: OBSTETRICS & GYNECOLOGY

## 2018-11-08 RX ADMIN — DOCUSATE SODIUM 100 MG: 100 CAPSULE, LIQUID FILLED ORAL at 09:42

## 2018-11-08 RX ADMIN — DOCUSATE SODIUM 100 MG: 100 CAPSULE, LIQUID FILLED ORAL at 21:36

## 2018-11-08 NOTE — PROGRESS NOTES
[facility]    Post-Partum Progress Note    Assessment & Plan   Assessment:  Post-partum day #1  Normal spontaneous vaginal delivery    Doing well.  No excessive bleeding  Pain well-controlled.    Plan:  Ambulation encouraged  Pain control measures as needed  Anticipate discharge tomorrow    Bertrand Johnson     Interval History   Doing well.  Pain is well-controlled.  No fevers.  No history of foul-smelling vaginal discharge.  Good appetite.  Denies chest pain, shortness of breath, nausea or vomiting.  Vaginal bleeding is similar to a heavy menstrual flow.  Ambulatory.  Breastfeeding well.    Medications     lactated ringers       lactated ringers Stopped (11/07/18 1600)     - MEDICATION INSTRUCTIONS -       - MEDICATION INSTRUCTIONS -       - MEDICATION INSTRUCTIONS -       NO Rho (D) immune globulin (RhoGam) needed - mother Rh POSITIVE       - MEDICATION INSTRUCTIONS -       - MEDICATION INSTRUCTIONS -       oxytocin in 0.9% NaCl 1 alexandro-units/min (11/07/18 1719)     oxytocin in 0.9% NaCl 340 mL/hr (11/07/18 1316)     oxytocin in 0.9% NaCl       oxytocin in 0.9% NaCl         docusate sodium  100 mg Oral BID     lactated ringers  1,000 mL Intravenous Once     sodium chloride (PF)  3 mL Intracatheter Q8H     sodium chloride (PF)  3 mL Intracatheter Q8H       Physical Exam   Temp: 98.5  F (36.9  C) Temp src: Oral BP: 118/64 Pulse: 78   Resp: 18 SpO2: 97 %      There were no vitals filed for this visit.  Vital Signs with Ranges  Temp:  [97.9  F (36.6  C)-99  F (37.2  C)] 98.5  F (36.9  C)  Pulse:  [78] 78  Resp:  [18] 18  BP: (106-159)/(56-85) 118/64  SpO2:  [97 %-100 %] 97 %  I/O last 3 completed shifts:  In: -   Out: 900 [Urine:500; Blood:400]    Uterine fundus is firm, non-tender and at the level of the umbilicus  Extremities Non-tender    Data   Recent Labs   Lab Test  11/07/18   0655  04/06/18   1644   ABO  O  O   RH  Pos  Pos   AS   --   Neg     Recent Labs   Lab Test  11/08/18   1388   07/31/18   1017   HGB  13.0  12.5     Recent Labs   Lab Test  04/06/18   1643   RUQIGG  83

## 2018-11-08 NOTE — PROGRESS NOTES
Intrathecal effective in pain management, not having contraction pain at this time. Patient voided before IT was given. Positioned supine with left hip tilt after IT completed.

## 2018-11-08 NOTE — ANESTHESIA POSTPROCEDURE EVALUATION
Patient: Christine Moncada    * No procedures listed *    Diagnosis:* No pre-op diagnosis entered *  Diagnosis Additional Information: No value filed.    Anesthesia Type:  No value filed.    Note:  Anesthesia Post Evaluation    Patient location during evaluation: Bedside  Patient participation: Able to fully participate in evaluation  Level of consciousness: awake and alert  Pain management: adequate  Airway patency: patent  Cardiovascular status: acceptable  Respiratory status: acceptable  Hydration status: acceptable  PONV: none     Anesthetic complications: None    Comments: Patient experienced good relief with her intrathecal.  She has been up walking around.  She has no residual numbness or tingling.  She has voided and has no fever or chills.         Last vitals:  Vitals:    11/07/18 2004 11/08/18 0020 11/08/18 0811   BP:  118/64 106/61   Pulse:   67   Resp:  18 18   Temp:  98.5  F (36.9  C) 96.6  F (35.9  C)   SpO2: 97%  99%         Electronically Signed By: AILYN WAKEFIELD CRNA  November 8, 2018  10:47 AM

## 2018-11-08 NOTE — PLAN OF CARE
Problem: Breastfeeding (Adult,Obstetrics,Pediatric)  Goal: Signs and Symptoms of Listed Potential Problems Will be Absent, Minimized or Managed (Breastfeeding)  Signs and symptoms of listed potential problems will be absent, minimized or managed by discharge/transition of care (reference Breastfeeding (Adult,Obstetrics,Pediatric) CPG).   Outcome: Therapy, progress toward functional goals is gradual  Mother has been independent with feedings and positioning.  Recognizes feeding cues well.  Complaining of some nipple tenderness. Confirmed proper latch.  Using lanolin cream.  Offered nipple shield but patient declines.     Problem: Postpartum (Vaginal Delivery) (Adult,Obstetrics,Pediatric)  Goal: Signs and Symptoms of Listed Potential Problems Will be Absent, Minimized or Managed (Postpartum)  Signs and symptoms of listed potential problems will be absent, minimized or managed by discharge/transition of care (reference Postpartum (Vaginal Delivery) (Adult,Obstetrics,Pediatric) CPG).   Outcome: Therapy, progress toward functional goals as expected  Assessments completed as charted. B/P: 118/64, T: 98.5, P: 78, R: 18. Rates pain: denies. Voiding without difficulty. Fundus: Midline and Deviated right Firm, above the U. Lochia: Light. Activity: unrestricted with out pain . Infant feeding: Breast feeding going well.     LATCH Score:   Latch: 1 - Repeated Attempts  Audible Swallowin - Few  Type of Nipple: (Breast/Nipple) 2 - Everted  Comfort: 2 - Soft, Nontender  Hold: 1 - Min. Assist   Total LATCH Score:     Postpartum breastfeeding assessment completed and education provided, see Patient Education Activity.  Items included in the education are:     proper positioning and latch    effectiveness of feeding    manual expression    handling and storing breastmilk    maintenance of breastfeeding for the first 6 months    sign/symptoms of infant feeding issues requiring referral to qualified health care provider  Postpartum  care education provided, see Patient Education activity. Patient denies needs. Will monitor.  Irene Donaldson RN BSN PHN

## 2018-11-08 NOTE — PROGRESS NOTES
Prior to the start of the procedure and with procedural staff participation, I verbally confirmed the patient s identity using two indicators, relevant allergies, that the procedure was appropriate and matched the consent or emergent situation, and that the correct equipment/implants were available. Immediately prior to starting the procedure I conducted the Time Out with the procedural staff and re-confirmed the patient s name, procedure, and site/side. (The Joint Commission universal protocol was followed.)  Yes    Sedation (Moderate or Deep): None

## 2018-11-08 NOTE — PLAN OF CARE
Problem: Postpartum (Vaginal Delivery) (Adult,Obstetrics,Pediatric)  Goal: Signs and Symptoms of Listed Potential Problems Will be Absent, Minimized or Managed (Postpartum)  Signs and symptoms of listed potential problems will be absent, minimized or managed by discharge/transition of care (reference Postpartum (Vaginal Delivery) (Adult,Obstetrics,Pediatric) CPG).  Uterus 1/U and to the right.  Patient able to void 500 mL clear urine.  Fundus at U but still slightly to the right. Firm. Bleeding small.  No clots.  Denies pain.  Will continue to monitor.

## 2018-11-09 VITALS
HEART RATE: 90 BPM | SYSTOLIC BLOOD PRESSURE: 112 MMHG | OXYGEN SATURATION: 96 % | RESPIRATION RATE: 16 BRPM | TEMPERATURE: 97.3 F | DIASTOLIC BLOOD PRESSURE: 69 MMHG

## 2018-11-09 PROCEDURE — 99207 ZZC NO CHARGE LOS: CPT | Performed by: OBSTETRICS & GYNECOLOGY

## 2018-11-09 RX ORDER — DOCUSATE SODIUM 100 MG/1
100 CAPSULE, LIQUID FILLED ORAL 2 TIMES DAILY PRN
Qty: 20 CAPSULE | Refills: 0 | Status: SHIPPED | OUTPATIENT
Start: 2018-11-09 | End: 2019-07-30

## 2018-11-09 NOTE — DISCHARGE SUMMARY
[facility]    Discharge Summary  Obstetrics    Date of Admission:  2018  Date of Discharge:  2018  Discharging Provider: Bertrand Johnson    Discharge Diagnoses   Normal vaginal delivery    History of Present Illness   Christine Moncada is a 38 year old female who presented with postterm pregnancy for induction of labor.    Hospital Course   The patient's hospital course was unremarkable.  She recovered as anticipated and experienced no post-delivery complications.  On discharge, her pain was well controlled. Vaginal bleeding is similar to peak menstrual flow.  Voiding without difficulty.  Ambulating well and tolerating a normal diet.  No fevers.  Breastfeeding well.  Infant is stable.  She was discharged on post-partum day #2.    Post-partum hemoglobin:   Hemoglobin   Date Value Ref Range Status   2018 13.0 11.7 - 15.7 g/dL Final       Bertrand Johnson    Discharge Disposition   Discharged to home   Condition at discharge: Good    Primary Care Physician   Baylee Lyle    Consultations This Hospital Stay   ANESTHESIOLOGY IP CONSULT  HOME CARE POST PARTUM/ IP CONSULT  LACTATION IP CONSULT    Discharge Orders     Activity   Review discharge instructions     Reason for your hospital stay   Maternity care     Follow Up and recommended labs and tests   Follow up with me,  Bertrand Johnson, within 6 weeks. for hospital follow- up.  No follow up labs or test are needed.     Discharge Instructions - Postpartum visit   Schedule postpartum visit with your provider and return to clinic in 6 weeks. Also 2 weeks if a  section was done.     Diet   Resume previous diet       Discharge Medications   Current Discharge Medication List      START taking these medications    Details   docusate sodium (COLACE) 100 MG capsule Take 1 capsule (100 mg) by mouth 2 times daily as needed for constipation  Qty: 20 capsule, Refills: 0    Associated Diagnoses:  (normal spontaneous vaginal  delivery)         CONTINUE these medications which have NOT CHANGED    Details   Prenatal Multivit-Min-Fe-FA (PRENATAL VITAMINS) 0.8 MG TABS 1 tab daily  Qty: 90 tablet, Refills: 3    Associated Diagnoses: Amenorrhea           Allergies   Allergies   Allergen Reactions     Pollen Extract      Other reaction(s): Runny Nose  Sneezy, itchy eyes     Amoxicillin Rash

## 2018-11-09 NOTE — PROGRESS NOTES
Patient discharged at this time. All questions answered. Verenice Mcclure RN on 11/9/2018 at 11:27 AM

## 2018-11-09 NOTE — PROGRESS NOTES
[facility]    Post-Partum Progress Note    Assessment & Plan   Assessment:  Post-partum day #1  Normal spontaneous vaginal delivery    Doing well.    Plan:  Ambulation encouraged  Pain control measures as needed  Reportable signs and symptoms dicussed with the patient    Bertrand JUANITASusan Johnson     Interval History   Doing well.  Pain is well-controlled.  No fevers.  No history of foul-smelling vaginal discharge.  Good appetite.  Denies chest pain, shortness of breath, nausea or vomiting.  Vaginal bleeding is similar to a heavy menstrual flow.  Ambulatory.  Breastfeeding well.    Medications     lactated ringers       lactated ringers Stopped (11/07/18 1600)     - MEDICATION INSTRUCTIONS -       - MEDICATION INSTRUCTIONS -       - MEDICATION INSTRUCTIONS -       NO Rho (D) immune globulin (RhoGam) needed - mother Rh POSITIVE       - MEDICATION INSTRUCTIONS -       - MEDICATION INSTRUCTIONS -       oxytocin in 0.9% NaCl 1 alexandro-units/min (11/07/18 1719)     oxytocin in 0.9% NaCl 340 mL/hr (11/07/18 1316)     oxytocin in 0.9% NaCl       oxytocin in 0.9% NaCl         docusate sodium  100 mg Oral BID     lactated ringers  1,000 mL Intravenous Once     sodium chloride (PF)  3 mL Intracatheter Q8H     sodium chloride (PF)  3 mL Intracatheter Q8H       Physical Exam   Temp: 97.3  F (36.3  C) Temp src: Temporal BP: 112/69 Pulse: 90   Resp: 16 SpO2: 96 % O2 Device: None (Room air)    There were no vitals filed for this visit.  Vital Signs with Ranges  Temp:  [97.3  F (36.3  C)] 97.3  F (36.3  C)  Pulse:  [67-90] 90  Resp:  [16] 16  BP: (112-113)/(63-69) 112/69  SpO2:  [96 %] 96 %       Uterine fundus is firm, non-tender and at the level of the umbilicus  Extremities Non-tender    Data   Recent Labs   Lab Test  11/07/18   0655  04/06/18   1644   ABO  O  O   RH  Pos  Pos   AS   --   Neg     Recent Labs   Lab Test  11/08/18   0448  07/31/18   1017   HGB  13.0  12.5     Recent Labs   Lab Test  04/06/18   1643    RHINAGG  83

## 2018-11-09 NOTE — PLAN OF CARE
Problem: Patient Care Overview  Goal: Plan of Care/Patient Progress Review  Fondus is midline but slightly to the right, U/U, and firm. Erika small amount. Nipples are tender but pt has been applying cream to them. Pt has denied pain or wanting anything for pain. Mother and father very involved with babe./69  Pulse 90  Temp 97.3  F (36.3  C) (Temporal)  Resp 16  LMP  (LMP Unknown)  SpO2 96%  Breastfeeding? Unknown

## 2018-12-20 ENCOUNTER — PRENATAL OFFICE VISIT (OUTPATIENT)
Dept: OBGYN | Facility: OTHER | Age: 38
End: 2018-12-20
Attending: OBSTETRICS & GYNECOLOGY
Payer: COMMERCIAL

## 2018-12-20 VITALS
HEART RATE: 60 BPM | SYSTOLIC BLOOD PRESSURE: 110 MMHG | WEIGHT: 182.2 LBS | DIASTOLIC BLOOD PRESSURE: 70 MMHG | BODY MASS INDEX: 29.41 KG/M2

## 2018-12-20 DIAGNOSIS — Z30.011 ENCOUNTER FOR INITIAL PRESCRIPTION OF CONTRACEPTIVE PILLS: ICD-10-CM

## 2018-12-20 DIAGNOSIS — Z12.4 CERVICAL CANCER SCREENING: Primary | ICD-10-CM

## 2018-12-20 PROBLEM — Z34.90 ENCOUNTER FOR PLANNED INDUCTION OF LABOR: Status: RESOLVED | Noted: 2018-11-07 | Resolved: 2018-12-20

## 2018-12-20 PROCEDURE — 87624 HPV HI-RISK TYP POOLED RSLT: CPT | Performed by: OBSTETRICS & GYNECOLOGY

## 2018-12-20 PROCEDURE — 99207 ZZC POST-PARTUM 6 WK VISIT - GICH ONLY: CPT | Performed by: OBSTETRICS & GYNECOLOGY

## 2018-12-20 PROCEDURE — 88142 CYTOPATH C/V THIN LAYER: CPT | Mod: TC | Performed by: OBSTETRICS & GYNECOLOGY

## 2018-12-20 PROCEDURE — G0123 SCREEN CERV/VAG THIN LAYER: HCPCS | Performed by: OBSTETRICS & GYNECOLOGY

## 2018-12-20 PROCEDURE — 40001026 ZZHCL STATISTICAL PAP TEST QC: Performed by: OBSTETRICS & GYNECOLOGY

## 2018-12-20 RX ORDER — NORGESTIMATE AND ETHINYL ESTRADIOL 0.25-0.035
1 KIT ORAL DAILY
Qty: 84 TABLET | Refills: 3 | Status: SHIPPED | OUTPATIENT
Start: 2018-12-20 | End: 2019-07-30

## 2018-12-20 RX ORDER — LEVONORGESTREL/ETHIN.ESTRADIOL 0.1-0.02MG
1 TABLET ORAL
COMMUNITY
Start: 2017-05-22 | End: 2019-07-30

## 2018-12-20 RX ORDER — IBUPROFEN 600 MG/1
600 TABLET, FILM COATED ORAL
COMMUNITY
Start: 2016-02-08 | End: 2019-07-30

## 2018-12-20 ASSESSMENT — ANXIETY QUESTIONNAIRES
IF YOU CHECKED OFF ANY PROBLEMS ON THIS QUESTIONNAIRE, HOW DIFFICULT HAVE THESE PROBLEMS MADE IT FOR YOU TO DO YOUR WORK, TAKE CARE OF THINGS AT HOME, OR GET ALONG WITH OTHER PEOPLE: NOT DIFFICULT AT ALL
7. FEELING AFRAID AS IF SOMETHING AWFUL MIGHT HAPPEN: NOT AT ALL
3. WORRYING TOO MUCH ABOUT DIFFERENT THINGS: NOT AT ALL
6. BECOMING EASILY ANNOYED OR IRRITABLE: NOT AT ALL
1. FEELING NERVOUS, ANXIOUS, OR ON EDGE: NOT AT ALL
5. BEING SO RESTLESS THAT IT IS HARD TO SIT STILL: NOT AT ALL
4. TROUBLE RELAXING: NOT AT ALL
2. NOT BEING ABLE TO STOP OR CONTROL WORRYING: NOT AT ALL
GAD7 TOTAL SCORE: 0

## 2018-12-20 ASSESSMENT — PAIN SCALES - GENERAL: PAINLEVEL: NO PAIN (0)

## 2018-12-20 ASSESSMENT — PATIENT HEALTH QUESTIONNAIRE - PHQ9: SUM OF ALL RESPONSES TO PHQ QUESTIONS 1-9: 0

## 2018-12-20 NOTE — NURSING NOTE
"Patient presents to the clinic for her 6 week post op appointment. Patient states the only concern she has is having some pressure \"down there\" denies any pain.    Medication Reconciliation Completed.    Ventura Santos LPN  12/20/2018 11:26 AM  "

## 2018-12-20 NOTE — PROGRESS NOTES
CC: postpartum exam at 6 weeks from delivery    HPI: Christine Moncada presents for postpartum exam. Baby was born by vaginal delivery. Complications included none  Mood:OK    Breastfeeding:yes  Incision(s): none  Bleeding: no  Birthcontrol: OCP's, then planning Depo Provera    Past Medical History:   Diagnosis Date     Encounter for full-term uncomplicated delivery     , VAVD x2     Joint disorder     2014     Major depressive disorder, single episode     remote hx     Other idiopathic scoliosis, site unspecified     in childhood     Personal history of other diseases of the nervous system and sense organs     ,abdominal, quiescent     Past Surgical History:   Procedure Laterality Date     OTHER SURGICAL HISTORY      ,,OTHER     Current Outpatient Medications   Medication     norgestimate-ethinyl estradiol (ORTHO-CYCLEN/SPRINTEC) 0.25-35 MG-MCG tablet     docusate sodium (COLACE) 100 MG capsule     Prenatal Multivit-Min-Fe-FA (PRENATAL VITAMINS) 0.8 MG TABS     No current facility-administered medications for this visit.       Allergies   Allergen Reactions     Pollen Extract      Other reaction(s): Runny Nose  Sneezy, itchy eyes     Amoxicillin Rash       REVIEW OF SYSTEMS  General: negative  Skin: no breast issues  GI: negative  : negative    O: /70 (BP Location: Right arm, Patient Position: Sitting, Cuff Size: Adult Large)   Pulse 60   Wt 82.6 kg (182 lb 3.2 oz)   LMP  (LMP Unknown)   BMI 29.41 kg/m    Body mass index is 29.41 kg/m .    Exam:  Constitutional: healthy, alert, active and no distress  Pelvic exam: normal vagina and vulva, normal cervix without lesions or tenderness, uterus normal size anteverted, adenxa normal in size without tenderness, pap smear done, exam chaperoned by nurse.    PHQ-9 score:    PHQ-9 SCORE 2018   PHQ-9 Total Score 0       Results for orders placed or performed during the hospital encounter of 18   Treponema Abs w Reflex to RPR  and Titer   Result Value Ref Range    Treponema Antibodies Nonreactive NR^Nonreactive   Hemoglobin   Result Value Ref Range    Hemoglobin 13.0 11.7 - 15.7 g/dL   ABO and Rh   Result Value Ref Range    ABO O     RH(D) Pos     Specimen Expires 11/10/2018          I/P:  Postpartum visit.    Resume annual preventive healthcare. Continue PNV's until done with childbearing.    (Z12.4) Cervical cancer screening  (primary encounter diagnosis)  Comment:   Plan: HPV High Risk Types DNA Cervical, Pap Screen         Thin Prep with HPV - recommended age 30 - 65         years (select HPV order below)            (Z30.011) Encounter for initial prescription of contraceptive pills  Comment:   Plan: norgestimate-ethinyl estradiol         (ORTHO-CYCLEN/SPRINTEC) 0.25-35 MG-MCG tablets        RTC prn    Bertrand Johnson MD FACOG  11:46 AM 12/20/2018

## 2018-12-20 NOTE — LETTER
[facility]  [address]  [place] [address]        December 24, 2018    Christine Moncada  [address] [place] [id number]          Dear Christnie Moncada    Your pap smear is normal.  It is generally recommended that women have a yearly pelvic exam.  If your provider  requested that you have a pap smear more frequently because of any previous problems please schedule that appointment as requested.  If you have any questions please call the clinic at [phone].      Sincerely,         Bertrand Johnson MD

## 2018-12-21 ASSESSMENT — ANXIETY QUESTIONNAIRES: GAD7 TOTAL SCORE: 0

## 2018-12-24 LAB
COPATH REPORT: NORMAL
PAP: NORMAL

## 2018-12-28 LAB
FINAL DIAGNOSIS: NORMAL
HPV HR 12 DNA CVX QL NAA+PROBE: NEGATIVE
HPV16 DNA SPEC QL NAA+PROBE: NEGATIVE
HPV18 DNA SPEC QL NAA+PROBE: NEGATIVE
SPECIMEN DESCRIPTION: NORMAL
SPECIMEN SOURCE CVX/VAG CYTO: NORMAL

## 2019-01-08 DIAGNOSIS — N81.4 UTERINE PROLAPSE: Primary | ICD-10-CM

## 2019-01-29 ENCOUNTER — HOSPITAL ENCOUNTER (OUTPATIENT)
Dept: PHYSICAL THERAPY | Facility: OTHER | Age: 39
Setting detail: THERAPIES SERIES
End: 2019-01-29
Attending: FAMILY MEDICINE
Payer: COMMERCIAL

## 2019-01-29 DIAGNOSIS — N81.4 UTERINE PROLAPSE: ICD-10-CM

## 2019-01-29 PROCEDURE — 97110 THERAPEUTIC EXERCISES: CPT | Mod: GP

## 2019-01-29 PROCEDURE — 97161 PT EVAL LOW COMPLEX 20 MIN: CPT | Mod: GP

## 2019-01-29 NOTE — PROGRESS NOTES
01/29/19 1500   General Information   Type of Visit Initial OP Ortho PT Evaluation   Start of Care Date 01/29/19   Referring Physician Baylee Lyle MD   Orders Evaluate and Treat   Date of Order 01/08/19   Insurance Type [employer]   Medical Diagnosis uterine prolapse   Surgical/Medical history reviewed Yes   General Information Comments delivery 11/7/18   Body Part(s)   Body Part(s) Lumbar Spine/SI;Pelvic Floor Dysfunction   Presentation and Etiology   Pertinent history of current problem (include personal factors and/or comorbidities that impact the POC) Concerns regarding prolapse. Was told by OB/GYN she was dealing with prolaspe. Wants to improve it and prevent worsening in future. Reports onset of heaviness with pelvic floor along with back pain that is new for her since her delivery of her 3rd child on 11/7/18. Also rectal pressure. Denies incontinence and constipation. Finshed with breast feeding. Vaginal delivery, baby over 8 #, 2nd degree perineal laceration. Also has a history of left hip issues, labral repair 2013 from Dr. Sanders. Acitve lifestyle, runner, exercise, has farm. 3 children (all boys). Is a .    Impairments A. Pain   Functional Limitations perform activities of daily living;perform desired leisure / sports activities   Symptom Location low back, pelvic floor   Onset date of current episode/exacerbation 11/07/18   Chronicity New   Pain rating (0-10 point scale) Best (/10);Worst (/10)   Best (/10) 0   Worst (/10) 5   Pain quality B. Dull;C. Aching;H. Other   Pain quality comment heaviness in pelvic floor   Frequency of pain/symptoms B. Intermittent   Pain/symptoms exacerbated by B. Walking;C. Lifting;D. Carrying;K. Home tasks;L. Work tasks   Pain/symptoms eased by C. Rest   Prior Level of Function   Prior Level of Function-Mobility independent, runner   Prior Level of Function-ADLs independent   Functional Level Prior Comment no prior heaviness with pelvic floor or  back pain   Current Level of Function   Patient role/employment history A. Employed   Employment Comments    Living environment [facility]   Fall Risk Screen   Fall screen completed by PT   Have you fallen 2 or more times in the past year? No   Have you fallen and had an injury in the past year? No   Is patient a fall risk? No   Lumbar Spine/SI Objective Findings   Posture right shoulder inferior, right iliac crest superior   Lumbar ROM Comment limited trunk rotation to left and right   Lumbar/Hip/Knee/Foot Strength Comments to be assessed at later date   Lumbar/SI Special Tests Comments + FFT right , + stork test right, right inferior pube, right ASIS inferior, right leg long in supine   Segmental Mobility to be assessed at later date   Palpation limited loading through bilateral shoulders, left and right pelvis. General listening to left anterior chest wall. Will continue with myofascial assessement at subsequent visits.   Specific Questions   Specific Questions Pelvic Floor Dysfunction;Women's Health   Pelvic Floor Dysfunction Questions   Regular exercise Yes   Women's Health Questions   Number of pregnancies  3   Number of vaginal deliveries  3   Number of  section deliveries  0   Weight of largest baby  greater than 8#   Number of episiotomies  3   Pelvic Floor Dysfunction Objective Findings   Pain-pelvic dysfunction Pelvic pain   Observation with palpation, no evidence of prolapse (cystocele, rectocel, apical)   Areas of Tightness Levators   Power (MMT at Levator Ani) 3  (tenderness with palpation to levator ani muscles. )   Endurance (Up to 10 seconds as long as still 50% power) 3   Repetitions (Contract 10 seconds or MVC, rest 4 seconds and count max number of reps) 10   Fast Twitch (Number of 1 second contractions can do in 10 seconds. Norm=7 reps) 4   Elevation (Able to lift posterior vaginal wall toward head and public bone) Present   Pelvic heaviness   Planned Therapy  Interventions   Planned Therapy Interventions joint mobilization;manual therapy;neuromuscular re-education;strengthening;stretching   Planned Modality Interventions   Planned Modality Interventions Biofeedback;Cryotherapy;Hot packs   Clinical Impression   Criteria for Skilled Therapeutic Interventions Met yes, treatment indicated   PT Diagnosis low back pain, pelvic pain, muscle weakness, lumbar segmental dysfunction, pelvic dysfunction, myofascial tightness   Influenced by the following impairments pain, heaviness of pelvic floor   Functional limitations due to impairments lifting, carrying, exercise, running, 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    ORTHO GOALS   PT Ortho Eval Goals 1;2;3;4;5   Ortho Goal 1   Goal Identifier exercise   Goal Description Patient to return to running and fitness routine without low back pain or pelvic floor heaviness causing limitations.    Target Date 07/29/19   Ortho Goal 2   Goal Identifier pelvic floor MMT   Goal Description Patient to have 5/5 MMT of pelvic floor muscles fo support during care and lifting of her childres.    Target Date 07/29/19   Ortho Goal 3   Goal Identifier joint mobility   Goal Description Patient to demonstrate normal lumbar and pelvic mechanics to allow pain free mobility during house hold tasks.    Target Date 07/29/19   Ortho Goal 4   Goal Identifier HEP   Goal Description Patient to be compliant with HEP for self managment of symptoms.    Target Date 07/29/19   Total Evaluation Time   PT Eval, Low Complexity  Minutes ([id number]) 15

## 2019-02-11 ENCOUNTER — HOSPITAL ENCOUNTER (OUTPATIENT)
Dept: PHYSICAL THERAPY | Facility: OTHER | Age: 39
Setting detail: THERAPIES SERIES
End: 2019-02-11
Attending: FAMILY MEDICINE
Payer: COMMERCIAL

## 2019-02-11 PROCEDURE — 97140 MANUAL THERAPY 1/> REGIONS: CPT | Mod: GP

## 2019-02-11 PROCEDURE — 97110 THERAPEUTIC EXERCISES: CPT | Mod: GP

## 2019-02-13 ENCOUNTER — HOSPITAL ENCOUNTER (OUTPATIENT)
Dept: PHYSICAL THERAPY | Facility: OTHER | Age: 39
Setting detail: THERAPIES SERIES
End: 2019-02-13
Attending: FAMILY MEDICINE
Payer: COMMERCIAL

## 2019-02-13 PROCEDURE — 97110 THERAPEUTIC EXERCISES: CPT | Mod: GP

## 2019-02-18 ENCOUNTER — HOSPITAL ENCOUNTER (OUTPATIENT)
Dept: PHYSICAL THERAPY | Facility: OTHER | Age: 39
Setting detail: THERAPIES SERIES
End: 2019-02-18
Attending: FAMILY MEDICINE
Payer: COMMERCIAL

## 2019-02-18 PROCEDURE — 97110 THERAPEUTIC EXERCISES: CPT | Mod: GP

## 2019-02-18 PROCEDURE — 97140 MANUAL THERAPY 1/> REGIONS: CPT | Mod: GP

## 2019-03-12 DIAGNOSIS — M62.89 PELVIC FLOOR DYSFUNCTION IN FEMALE: Primary | ICD-10-CM

## 2019-03-28 NOTE — PROGRESS NOTES
Outpatient Physical Therapy Discharge Note     Patient: Christine Moncada  : 1980    Beginning/End Dates of Reporting Period:  19 to 2019    Referring Provider: Baylee Lyle MD    Therapy Diagnosis: low back pain, pelvic pain, muscle weakness, lumbar segmental dysfunction, pelvic dysfunction, myofascial tightness     Client Self Report: working HEP. Rectal pressure continues. Brought on by lifting, carrying crate, lifting around the farm.     Objective Measurements:  Objective Measure: tissue loading  Details: equal loading, no listening  Objective Measure: PERFECT  Details: 4-/10/3/5/P/P/A       Goals:  Goal Identifier exercise   Goal Description Patient to return to running and fitness routine without low back pain or pelvic floor heaviness causing limitations.    Target Date 19   Date Met      Progress: progressing     Goal Identifier pelvic floor MMT   Goal Description Patient to have 5/5 MMT of pelvic floor muscles fo support during care and lifting of her childres.    Target Date 19   Date Met      Progress: progressing     Goal Identifier joint mobility   Goal Description Patient to demonstrate normal lumbar and pelvic mechanics to allow pain free mobility during house hold tasks.    Target Date 19   Date Met   19   Progress:     Goal Identifier HEP   Goal Description Patient to be compliant with HEP for self managment of symptoms.    Target Date 19   Date Met   19   Progress:         Progress Toward Goals:   Progress this reporting period: progression with PFM strength but rectal pressure continues with heavy lifting.           Plan:  Discharge from therapy.    Discharge:    Reason for Discharge: Patient needs more time to work HEP for long term benefits.     Equipment Issued: NA    Discharge Plan: Patient to continue home program.

## 2019-03-28 NOTE — ADDENDUM NOTE
Encounter addended by: Isidra Collins, PT on: 3/28/2019 3:20 PM   Actions taken: Pend clinical note, Flowsheet accepted, Sign clinical note, Episode resolved

## 2019-04-03 ENCOUNTER — TRANSFERRED RECORDS (OUTPATIENT)
Dept: HEALTH INFORMATION MANAGEMENT | Facility: OTHER | Age: 39
End: 2019-04-03

## 2019-07-30 ENCOUNTER — ALLIED HEALTH/NURSE VISIT (OUTPATIENT)
Dept: OBGYN | Facility: OTHER | Age: 39
End: 2019-07-30
Attending: FAMILY MEDICINE
Payer: COMMERCIAL

## 2019-07-30 DIAGNOSIS — N91.2 AMENORRHEA: ICD-10-CM

## 2019-07-30 DIAGNOSIS — O36.80X0 ENCOUNTER TO DETERMINE FETAL VIABILITY OF PREGNANCY, SINGLE OR UNSPECIFIED FETUS: ICD-10-CM

## 2019-07-30 DIAGNOSIS — N92.6 MISSED MENSES: Primary | ICD-10-CM

## 2019-07-30 LAB — B-HCG SERPL-ACNC: NORMAL IU/L

## 2019-07-30 PROCEDURE — 99211 OFF/OP EST MAY X REQ PHY/QHP: CPT

## 2019-07-30 PROCEDURE — 84702 CHORIONIC GONADOTROPIN TEST: CPT | Mod: ZL | Performed by: OBSTETRICS & GYNECOLOGY

## 2019-07-30 PROCEDURE — 36415 COLL VENOUS BLD VENIPUNCTURE: CPT | Mod: ZL | Performed by: OBSTETRICS & GYNECOLOGY

## 2019-07-30 RX ORDER — PRENATAL VIT/IRON FUM/FOLIC AC 27MG-0.8MG
1 TABLET ORAL DAILY
Qty: 90 TABLET | Refills: 3 | Status: SHIPPED | OUTPATIENT
Start: 2019-07-30 | End: 2019-11-11

## 2019-07-30 NOTE — PROGRESS NOTES
HPI:    This is a 39 year old female patient,  who presents for Pregnancy confirmation visit. Patient reports positive pregnancy test at home.     Obstetrical history, OB Questionnaire, and OB Demographics updated to the best of this nurse's ability based on patient report. PHQ-9 depression screening and routine Domestic Abuse screening completed. OB Education packet provided and reviewed by this nurse. All immediate questions and concerns answered.    Last menstrual period is reported as Patient's last menstrual period was 2019 (approximate). ZACH based on LMP is 3/22/2020.   Her cycles are irregular.  Her last menstrual period was abnormal.   Since her LMP, she has experienced  nausea).   She denies emesis, abdominal pain, fatigue, headache, loss of appetite, vaginal discharge, dysuria, pelvic pain, urinary urgency, lightheadedness, urinary frequency, vaginal bleeding, hemorrhoids and constipation.    Personal OB history includes: G  5 and P  3  Previous OB Provider: Dr. Bertrand Johnson MD, FACOG  Previous Delivering Clinic: [facility]  Release of Records: None    Current delivery plan: [facility]  Preferred OB Provider: Dr. Bertrand Johnson MD, FACOG  Current Primary Care Provider: [facility]  Pediatrician: [facility]    Additional History: Patient had prolapse with her last one and has been doing months of physical therapy.      Have you travelled during the pregnancy?No  Have your sexual partner(s) travelled during the pregnancy?No    HISTORY:   Planned Pregnancy: No  Marital Status:   Occupation: Teacher at [employer]  Living in Household: Spouse and Children    Father of the baby is involved.   Family and father of baby is supportive of current pregnancy.  Past Medical History of Father of Baby:Ulcerative Colitis    Past History:  Her past medical history   Past Medical History:   Diagnosis Date     Encounter for full-term uncomplicated delivery     , VAVD x2     Joint disorder     2014     Major  depressive disorder, single episode     remote hx     Other idiopathic scoliosis, site unspecified     in childhood     Personal history of other diseases of the nervous system and sense organs     2001,abdominal, quiescent   .      She has a history of  None    Since her last LMP she denies use of alcohol, tobacco and street drugs.    Pap smear history:   Last 3 Pap Results:   PAP (no units)   Date Value   2018 NIL     Last 3 Pap and HPV Results:   PAP / HPV Latest Ref Rng & Units 2018   PAP - NIL   HPV 16 DNA NEG:Negative Negative   HPV 18 DNA NEG:Negative Negative   OTHER HR HPV NEG:Negative Negative       STD/STI history: No STD history    STD/STI symptoms: None     Past medical, surgical, social and family history were reviewed and updated in EPIC.    Medications reviewed by this nurse. Current medication list:  Current Outpatient Medications   Medication Sig Dispense Refill     Prenatal Vit-Fe Fumarate-FA (PRENATAL MULTIVITAMIN W/IRON) 27-0.8 MG tablet Take 1 tablet by mouth daily 90 tablet 3     The following medications were recommended to be discontinued due to Pregnancy Category D status: ibuprofen and contraceptive pill.   Patient informed to contact her primary care provider as soon as possible to discuss a safer alternative.    Risk factors:  Moderate and moderately severe risks (consult with OB/Gyn)  Previous fetal or  demise: No  History of  delivery: No  History of heart disease Class I: No  Severe anemia, unresponsive to iron therapy: No  Pelvic mass or neoplasm: No  Previous : No  Hyper/hypothyroidism: No  History of postpartum hemorrhage requiring transfusion:No  History of Placenta Accreta: No    High Risk (Pregnancy managed by OB/Gyn)  Multiple pregnancy: No  Pre-gestational diabetes: No  Chronic Hypertension: No  Renal Failure: No  Heart disease, class II or greater: No  Rh Isoimmunization: No  Chronic active hepatitis: No  Convulsive disorder, poorly  controlled: No  Isoimmune thrombocytopenia: No  Pre-term premature rupture of membranes: No  Lupus or other autoimmune disorder: No  Human Immunodeficiency Virus: No      ASSESSMENT/PLAN:       ICD-10-CM    1. Missed menses N92.6    2. Encounter to determine fetal viability of pregnancy, single or unspecified fetus O36.80X0 HCG quantitative pregnancy     Prenatal Vit-Fe Fumarate-FA (PRENATAL MULTIVITAMIN W/IRON) 27-0.8 MG tablet   3. Amenorrhea N91.2        39 year old , 6w2d of pregnancy with ZACH of 3/22/2020, by Last Menstrual Period    Urine pregnancy test completed during this visit, results were as noted above.     Per standing orders and scope of practice of this nurse, patient will have the following orders placed and completed prior to initial OB visit with the appropriate provider:    --early ultrasound for dating and viability ordered for approximately 6-7 weeks gestation based on LMP    --Quantitative Beta HCG and progesterone monitoring if indicated    Counseling given:     - Recommended weight gain for pregnancy: 15-25 lbs.   BMI < 18.5  28-40 lbs   18.5 - 24.9 25-35   25 - 29.9 15-25   > 30  11-20      PLAN/PATIENT INSTRUCTIONS:    Follow up in 3 weeks.  Normal exercise.  Normal sexual activity.  Prenatal vitamins.  Anticipated weight gain.    follow-up appointment with Dr. Dr. Bertrand Johnson MD, FACOG for pre- care, take multivitamin or pre- vitamins, OB Education packet given.     Ashlie Wakefield.................................................. 2019 10:44 AM

## 2019-08-22 ENCOUNTER — OFFICE VISIT (OUTPATIENT)
Dept: OBGYN | Facility: OTHER | Age: 39
End: 2019-08-22
Attending: OBSTETRICS & GYNECOLOGY
Payer: COMMERCIAL

## 2019-08-22 ENCOUNTER — HOSPITAL ENCOUNTER (OUTPATIENT)
Dept: ULTRASOUND IMAGING | Facility: OTHER | Age: 39
Discharge: HOME | End: 2019-08-22
Attending: OBSTETRICS & GYNECOLOGY | Admitting: OBSTETRICS & GYNECOLOGY
Payer: COMMERCIAL

## 2019-08-22 ENCOUNTER — TELEPHONE (OUTPATIENT)
Dept: OBGYN | Facility: OTHER | Age: 39
End: 2019-08-22

## 2019-08-22 VITALS
DIASTOLIC BLOOD PRESSURE: 66 MMHG | BODY MASS INDEX: 27.57 KG/M2 | HEART RATE: 64 BPM | SYSTOLIC BLOOD PRESSURE: 128 MMHG | WEIGHT: 170.8 LBS | RESPIRATION RATE: 16 BRPM

## 2019-08-22 DIAGNOSIS — O20.0 THREATENED MISCARRIAGE: Primary | ICD-10-CM

## 2019-08-22 DIAGNOSIS — O20.0 THREATENED MISCARRIAGE: ICD-10-CM

## 2019-08-22 DIAGNOSIS — Z30.09 ENCOUNTER FOR GENERAL COUNSELING AND ADVICE ON CONTRACEPTIVE MANAGEMENT: ICD-10-CM

## 2019-08-22 PROCEDURE — 99213 OFFICE O/P EST LOW 20 MIN: CPT | Performed by: OBSTETRICS & GYNECOLOGY

## 2019-08-22 PROCEDURE — 76817 TRANSVAGINAL US OBSTETRIC: CPT

## 2019-08-22 ASSESSMENT — PAIN SCALES - GENERAL: PAINLEVEL: NO PAIN (0)

## 2019-08-22 NOTE — TELEPHONE ENCOUNTER
Returned patient's call.  She is 9w4d and reported vaginal bleeding and cramping across her whole stomach.  Patient has not had her US yet.  Patient was scheduled with [facility] today 1015.    Ashlie Wakefield RN on 8/22/2019 at 8:37 AM

## 2019-08-22 NOTE — NURSING NOTE
"Chief Complaint   Patient presents with     Prenatal Care     9w 4d --- bleeding and cramping.       Initial /66 (BP Location: Right arm, Patient Position: Sitting, Cuff Size: Adult Regular)   Pulse 64   Resp 16   Wt 77.5 kg (170 lb 12.8 oz)   LMP 06/16/2019 (Approximate)   Breastfeeding? No   BMI 27.57 kg/m   Estimated body mass index is 27.57 kg/m  as calculated from the following:    Height as of 5/8/18: 1.676 m (5' 6\").    Weight as of this encounter: 77.5 kg (170 lb 12.8 oz).  Medication Reconciliation: Completed     Latha Hernandez LPN  "

## 2019-08-22 NOTE — PROGRESS NOTES
Suzi very pleasant 39-year-old para 3 who comes in this morning at 9 weeks gestation but with 2-day history of spotting.  Has had one previous miscarriage and states that this pregnancy seems very similar to that.  Minimal symptoms of pregnancy.  Mild cramping.    At this point we arranged ultrasound.    Ultrasound showed intrauterine gestational sac measuring just over 8 weeks size but no yolk sac or fetal pole are identified.  Adnexa normal.    Blood type O positive    Assessment: Blighted ovum    Plan: Miscarriage discussed in detail.  Routine information given.  At this point in time patient would like to consider expectant management may consider D&C if the pregnancy has not completed itself in the next week or so.  All questions answered    20 minutes spent, majority in counseling

## 2019-09-03 ENCOUNTER — MYC MEDICAL ADVICE (OUTPATIENT)
Dept: OBGYN | Facility: OTHER | Age: 39
End: 2019-09-03

## 2019-09-27 ENCOUNTER — TRANSFERRED RECORDS (OUTPATIENT)
Dept: HEALTH INFORMATION MANAGEMENT | Facility: OTHER | Age: 39
End: 2019-09-27

## 2019-11-11 ENCOUNTER — OFFICE VISIT (OUTPATIENT)
Dept: OBGYN | Facility: OTHER | Age: 39
End: 2019-11-11
Attending: OBSTETRICS & GYNECOLOGY
Payer: COMMERCIAL

## 2019-11-11 VITALS
BODY MASS INDEX: 26.89 KG/M2 | WEIGHT: 166.6 LBS | SYSTOLIC BLOOD PRESSURE: 126 MMHG | HEART RATE: 64 BPM | RESPIRATION RATE: 16 BRPM | DIASTOLIC BLOOD PRESSURE: 66 MMHG

## 2019-11-11 DIAGNOSIS — O03.2 INCOMPLETE MISCARRIAGE WITH BLOOD CLOT: Primary | ICD-10-CM

## 2019-11-11 LAB — B-HCG SERPL-ACNC: <1 IU/L

## 2019-11-11 PROCEDURE — 88305 TISSUE EXAM BY PATHOLOGIST: CPT

## 2019-11-11 PROCEDURE — 84702 CHORIONIC GONADOTROPIN TEST: CPT | Mod: ZL | Performed by: OBSTETRICS & GYNECOLOGY

## 2019-11-11 PROCEDURE — 36415 COLL VENOUS BLD VENIPUNCTURE: CPT | Mod: ZL | Performed by: OBSTETRICS & GYNECOLOGY

## 2019-11-11 PROCEDURE — 76830 TRANSVAGINAL US NON-OB: CPT | Performed by: OBSTETRICS & GYNECOLOGY

## 2019-11-11 PROCEDURE — 76856 US EXAM PELVIC COMPLETE: CPT | Performed by: OBSTETRICS & GYNECOLOGY

## 2019-11-11 PROCEDURE — 99213 OFFICE O/P EST LOW 20 MIN: CPT | Performed by: OBSTETRICS & GYNECOLOGY

## 2019-11-11 ASSESSMENT — PAIN SCALES - GENERAL: PAINLEVEL: NO PAIN (0)

## 2019-11-11 NOTE — NURSING NOTE
"Chief Complaint   Patient presents with     Follow Up     Ultrasound only saw Dr. Tonny Sanchez today.       Initial LMP 10/28/2019  Estimated body mass index is 26.89 kg/m  as calculated from the following:    Height as of 5/8/18: 1.676 m (5' 6\").    Weight as of an earlier encounter on 11/11/19: 75.6 kg (166 lb 9.6 oz).  Medication Reconciliation: Completed    Jerri Wakefield LPN............. 11/11/2019 3:21 PM       Ultrasound only saw Dr. Tonny Sanchez today.  "

## 2019-11-11 NOTE — NURSING NOTE
"Chief Complaint   Patient presents with     RECHECK     bleeding and passing tissue.       Initial /66 (BP Location: Right arm, Patient Position: Sitting, Cuff Size: Adult Regular)   Pulse 64   Resp 16   Wt 75.6 kg (166 lb 9.6 oz)   LMP 10/28/2019   Breastfeeding? No   BMI 26.89 kg/m   Estimated body mass index is 26.89 kg/m  as calculated from the following:    Height as of 5/8/18: 1.676 m (5' 6\").    Weight as of this encounter: 75.6 kg (166 lb 9.6 oz).  Medication Reconciliation: Completed     Latha Hernandez LPN  "

## 2020-01-03 ENCOUNTER — TRANSFERRED RECORDS (OUTPATIENT)
Dept: HEALTH INFORMATION MANAGEMENT | Facility: OTHER | Age: 40
End: 2020-01-03

## 2020-03-02 ENCOUNTER — HEALTH MAINTENANCE LETTER (OUTPATIENT)
Age: 40
End: 2020-03-02

## 2020-05-14 ENCOUNTER — TRANSFERRED RECORDS (OUTPATIENT)
Dept: HEALTH INFORMATION MANAGEMENT | Facility: OTHER | Age: 40
End: 2020-05-14

## 2020-07-12 DIAGNOSIS — Z30.09 ENCOUNTER FOR GENERAL COUNSELING AND ADVICE ON CONTRACEPTIVE MANAGEMENT: ICD-10-CM

## 2020-07-14 RX ORDER — NORETHINDRONE AND ETHINYL ESTRADIOL 1 MG-35MCG
KIT ORAL
Qty: 84 TABLET | Refills: 0 | Status: SHIPPED | OUTPATIENT
Start: 2020-07-14 | End: 2020-08-20

## 2020-07-14 NOTE — TELEPHONE ENCOUNTER
LOV 11/11/2019. Refill granted. She should follow up this Fall for annual exam.    Tori Ricci RN...................7/14/2020 4:24 PM

## 2020-08-20 ENCOUNTER — MYC MEDICAL ADVICE (OUTPATIENT)
Dept: FAMILY MEDICINE | Facility: OTHER | Age: 40
End: 2020-08-20

## 2020-08-20 DIAGNOSIS — Z30.09 ENCOUNTER FOR GENERAL COUNSELING AND ADVICE ON CONTRACEPTIVE MANAGEMENT: ICD-10-CM

## 2020-08-20 RX ORDER — NORETHINDRONE AND ETHINYL ESTRADIOL 1 MG-35MCG
1 KIT ORAL DAILY
Qty: 84 TABLET | Refills: 0 | Status: SHIPPED | OUTPATIENT
Start: 2020-08-20 | End: 2020-09-04

## 2020-08-20 NOTE — TELEPHONE ENCOUNTER
Would Baylee Lyle MD like patient to schedule an appointment for a px?    Lexie Quick LPN.................. 8/20/2020 1:41 PM

## 2020-09-04 ENCOUNTER — OFFICE VISIT (OUTPATIENT)
Dept: FAMILY MEDICINE | Facility: OTHER | Age: 40
End: 2020-09-04
Attending: FAMILY MEDICINE
Payer: COMMERCIAL

## 2020-09-04 VITALS
OXYGEN SATURATION: 95 % | HEIGHT: 65 IN | SYSTOLIC BLOOD PRESSURE: 118 MMHG | TEMPERATURE: 97.2 F | HEART RATE: 75 BPM | DIASTOLIC BLOOD PRESSURE: 70 MMHG | WEIGHT: 173 LBS | RESPIRATION RATE: 16 BRPM | BODY MASS INDEX: 28.82 KG/M2

## 2020-09-04 DIAGNOSIS — Z00.00 HEALTH CARE MAINTENANCE: Primary | ICD-10-CM

## 2020-09-04 DIAGNOSIS — R00.2 HEART PALPITATIONS: ICD-10-CM

## 2020-09-04 DIAGNOSIS — M25.551 BILATERAL HIP PAIN: ICD-10-CM

## 2020-09-04 DIAGNOSIS — Z13.220 SCREENING FOR LIPID DISORDERS: ICD-10-CM

## 2020-09-04 DIAGNOSIS — L71.0 PERIORAL DERMATITIS: ICD-10-CM

## 2020-09-04 DIAGNOSIS — M25.552 BILATERAL HIP PAIN: ICD-10-CM

## 2020-09-04 DIAGNOSIS — Z30.41 ENCOUNTER FOR SURVEILLANCE OF CONTRACEPTIVE PILLS: ICD-10-CM

## 2020-09-04 LAB
ALBUMIN SERPL-MCNC: 4.1 G/DL (ref 3.5–5.7)
ALP SERPL-CCNC: 39 U/L (ref 34–104)
ALT SERPL W P-5'-P-CCNC: 10 U/L (ref 7–52)
ANION GAP SERPL CALCULATED.3IONS-SCNC: 8 MMOL/L (ref 3–14)
AST SERPL W P-5'-P-CCNC: 15 U/L (ref 13–39)
BASOPHILS # BLD AUTO: 0 10E9/L (ref 0–0.2)
BASOPHILS NFR BLD AUTO: 0.8 %
BILIRUB SERPL-MCNC: 0.3 MG/DL (ref 0.3–1)
BUN SERPL-MCNC: 21 MG/DL (ref 7–25)
CALCIUM SERPL-MCNC: 8.6 MG/DL (ref 8.6–10.3)
CHLORIDE SERPL-SCNC: 105 MMOL/L (ref 98–107)
CHOLEST SERPL-MCNC: 156 MG/DL
CO2 SERPL-SCNC: 26 MMOL/L (ref 21–31)
CREAT SERPL-MCNC: 1.05 MG/DL (ref 0.6–1.2)
DIFFERENTIAL METHOD BLD: NORMAL
EOSINOPHIL # BLD AUTO: 0.1 10E9/L (ref 0–0.7)
EOSINOPHIL NFR BLD AUTO: 1.4 %
ERYTHROCYTE [DISTWIDTH] IN BLOOD BY AUTOMATED COUNT: 12.4 % (ref 10–15)
GFR SERPL CREATININE-BSD FRML MDRD: 59 ML/MIN/{1.73_M2}
GLUCOSE SERPL-MCNC: 100 MG/DL (ref 70–105)
HCT VFR BLD AUTO: 40.5 % (ref 35–47)
HDLC SERPL-MCNC: 62 MG/DL (ref 23–92)
HGB BLD-MCNC: 13 G/DL (ref 11.7–15.7)
IMM GRANULOCYTES # BLD: 0 10E9/L (ref 0–0.4)
IMM GRANULOCYTES NFR BLD: 0.2 %
LDLC SERPL CALC-MCNC: 79 MG/DL
LYMPHOCYTES # BLD AUTO: 1.8 10E9/L (ref 0.8–5.3)
LYMPHOCYTES NFR BLD AUTO: 36 %
MCH RBC QN AUTO: 29.3 PG (ref 26.5–33)
MCHC RBC AUTO-ENTMCNC: 32.1 G/DL (ref 31.5–36.5)
MCV RBC AUTO: 91 FL (ref 78–100)
MONOCYTES # BLD AUTO: 0.3 10E9/L (ref 0–1.3)
MONOCYTES NFR BLD AUTO: 6.7 %
NEUTROPHILS # BLD AUTO: 2.7 10E9/L (ref 1.6–8.3)
NEUTROPHILS NFR BLD AUTO: 54.9 %
NONHDLC SERPL-MCNC: 94 MG/DL
PLATELET # BLD AUTO: 301 10E9/L (ref 150–450)
POTASSIUM SERPL-SCNC: 3.7 MMOL/L (ref 3.5–5.1)
PROT SERPL-MCNC: 6.8 G/DL (ref 6.4–8.9)
RBC # BLD AUTO: 4.44 10E12/L (ref 3.8–5.2)
SODIUM SERPL-SCNC: 139 MMOL/L (ref 134–144)
TRIGL SERPL-MCNC: 75 MG/DL
TSH SERPL DL<=0.05 MIU/L-ACNC: 0.5 IU/ML (ref 0.34–5.6)
WBC # BLD AUTO: 4.9 10E9/L (ref 4–11)

## 2020-09-04 PROCEDURE — 85025 COMPLETE CBC W/AUTO DIFF WBC: CPT | Mod: ZL | Performed by: FAMILY MEDICINE

## 2020-09-04 PROCEDURE — 80053 COMPREHEN METABOLIC PANEL: CPT | Mod: ZL | Performed by: FAMILY MEDICINE

## 2020-09-04 PROCEDURE — 84443 ASSAY THYROID STIM HORMONE: CPT | Mod: ZL | Performed by: FAMILY MEDICINE

## 2020-09-04 PROCEDURE — 80061 LIPID PANEL: CPT | Mod: ZL | Performed by: FAMILY MEDICINE

## 2020-09-04 PROCEDURE — 99395 PREV VISIT EST AGE 18-39: CPT | Performed by: FAMILY MEDICINE

## 2020-09-04 PROCEDURE — 36415 COLL VENOUS BLD VENIPUNCTURE: CPT | Mod: ZL | Performed by: FAMILY MEDICINE

## 2020-09-04 RX ORDER — LEVONORGESTREL/ETHIN.ESTRADIOL 0.1-0.02MG
1 TABLET ORAL DAILY
Qty: 84 TABLET | Refills: 4 | Status: SHIPPED | OUTPATIENT
Start: 2020-09-04 | End: 2021-07-21

## 2020-09-04 RX ORDER — NORETHINDRONE AND ETHINYL ESTRADIOL 1 MG-35MCG
1 KIT ORAL DAILY
Qty: 84 TABLET | Refills: 4 | Status: CANCELLED | OUTPATIENT
Start: 2020-09-04

## 2020-09-04 ASSESSMENT — MIFFLIN-ST. JEOR: SCORE: 1465.6

## 2020-09-04 ASSESSMENT — ENCOUNTER SYMPTOMS
CHILLS: 0
PALPITATIONS: 1
SHORTNESS OF BREATH: 0
CHEST TIGHTNESS: 1
FEVER: 0
COUGH: 0
ARTHRALGIAS: 1
NERVOUS/ANXIOUS: 1

## 2020-09-04 ASSESSMENT — PAIN SCALES - GENERAL: PAINLEVEL: NO PAIN (0)

## 2020-09-04 NOTE — NURSING NOTE
Patient presents to clinic for physical and annual exam.  Medication Reconciliation: complete    Jennifer Pretty LPN

## 2020-09-04 NOTE — PROGRESS NOTES
SUBJECTIVE:   Nursing Notes:   Jennifer Pretty LPN  2020  3:50 PM  Signed  Patient presents to clinic for physical and annual exam.  Medication Reconciliation: complete    Jennifer Pretty LPN      Christine Moncada is a 40 year old female who presents to clinic today for a physical.      Has had more stress.  Has had moments where her chest feels tight and her heart beat seems irregular.  If she takes some deep breaths, will resolve.      Has had a lot of nausea/vomiting every month for up to a week on her current oral contraceptives.  Would like to go back to the previous aviane she had been on.    Needs refill of Metronidazole cream which she uses for perioral dermatitis.  With having to wear a mask more often with the COVID-19 pandemic, she has noticed some increase in this rash.    Feels like her left hip labrum tear may have recurred.  Dr. Sanders did her previous surgery.  Had tried Physical Therapy previously for her pelvic floor issues.  She is also going to Chiropractor.  She was found to have tight hip flexors.  Has a left anterior hip pain and a pinching sensation.  She is still able to exercise, but has stopped running for exercise.  She is lifting weights, biking.  She cannot sit cross-legged on the floor comfortably.    HPI    I personally reviewed medications/allergies/history listed below:    Patient Active Problem List    Diagnosis Date Noted     Personal history of other mental disorder 2018     Priority: Medium     Overview:   with OCD components       Hip disease 2014     Priority: Medium     Dyspareunia (CODE) 2013     Priority: Medium     Past Medical History:   Diagnosis Date     Encounter for full-term uncomplicated delivery     , VAVD x2     Joint disorder     2014     Major depressive disorder, single episode     remote hx     Other idiopathic scoliosis, site unspecified     in childhood     Personal history of other diseases of the nervous system and  "sense organs     2001,abdominal, quiescent      Past Surgical History:   Procedure Laterality Date     OTHER SURGICAL HISTORY      ,[id number],OTHER     Family History   Problem Relation Age of Onset     Other - See Comments Mother          due to brother's large size     Social History     Tobacco Use     Smoking status: Never Smoker     Smokeless tobacco: Never Used     Tobacco comment: Quit smoking: In childhood home   Substance Use Topics     Alcohol use: No     Alcohol/week: 0.0 standard drinks     Social History     Social History Narrative     in 2005.  Masters Degree from the [employer].  .  Her  his a .    They have 3 sons.   - Reyes George - Balwinder      Son - Manpreet      Son - Elia     Current Outpatient Medications   Medication Sig Dispense Refill     levonorgestrel-ethinyl estradiol (AVIANE) 0.1-20 MG-MCG tablet Take 1 tablet by mouth daily 84 tablet 4     metroNIDAZOLE (METROCREAM) 0.75 % external cream Apply topically 2 times daily 45 g 3     Allergies   Allergen Reactions     Pollen Extract      Other reaction(s): Runny Nose  Sneezy, itchy eyes     Amoxicillin Rash       Review of Systems   Constitutional: Negative for chills and fever.   Respiratory: Positive for chest tightness. Negative for cough and shortness of breath.    Cardiovascular: Positive for palpitations. Negative for peripheral edema.   Musculoskeletal: Positive for arthralgias.   Psychiatric/Behavioral: Negative for mood changes. The patient is nervous/anxious.         OBJECTIVE:     /70 (BP Location: Right arm, Patient Position: Sitting, Cuff Size: Adult Regular)   Pulse 75   Temp 97.2  F (36.2  C) (Tympanic)   Resp 16   Ht 1.651 m (5' 5\")   Wt 78.5 kg (173 lb)   LMP 2020 (Exact Date)   SpO2 95%   Breastfeeding No   BMI 28.79 kg/m    Body mass index is 28.79 kg/m .  Physical Exam  Constitutional:       " General: She is not in acute distress.     Appearance: She is well-developed.   HENT:      Head: Normocephalic.      Right Ear: Tympanic membrane and external ear normal.      Left Ear: Tympanic membrane and external ear normal.      Nose: Nose normal.      Mouth/Throat:      Pharynx: No oropharyngeal exudate.   Eyes:      General:         Right eye: No discharge.         Left eye: No discharge.      Conjunctiva/sclera: Conjunctivae normal.      Pupils: Pupils are equal, round, and reactive to light.   Neck:      Musculoskeletal: Neck supple.      Thyroid: No thyromegaly.      Trachea: No tracheal deviation.   Cardiovascular:      Rate and Rhythm: Normal rate and regular rhythm.      Pulses: Normal pulses.      Heart sounds: Normal heart sounds, S1 normal and S2 normal. No murmur. No friction rub. No gallop. No S3 or S4 sounds.    Pulmonary:      Effort: Pulmonary effort is normal. No respiratory distress.      Breath sounds: Normal breath sounds. No wheezing or rales.      Comments: Breast exam:  No masses palpable bilaterally.  No skin changes, tethering or axillary lymphadenopathy bilaterally.    Abdominal:      General: Bowel sounds are normal. There is no distension.      Palpations: Abdomen is soft. There is no mass.      Tenderness: There is no abdominal tenderness.   Genitourinary:     Comments: Pelvic/Rectal exams deferred per patient.  Musculoskeletal: Normal range of motion.   Lymphadenopathy:      Cervical: No cervical adenopathy.   Skin:     General: Skin is warm and dry.      Findings: No rash.   Neurological:      Mental Status: She is alert and oriented to person, place, and time.      Motor: No abnormal muscle tone.      Deep Tendon Reflexes: Reflexes are normal and symmetric.   Psychiatric:         Thought Content: Thought content normal.         Judgment: Judgment normal.           PHQ-9 SCORE 11/23/2015 3/7/2018 12/20/2018   PHQ-9 Total Score 0 0 0       PHQ-2 Score:     PHQ-2 ( 1999 Pfizer)  9/4/2020 11/11/2019   Q1: Little interest or pleasure in doing things 0 0   Q2: Feeling down, depressed or hopeless 0 0   PHQ-2 Score 0 0       DANIELLE-7 SCORE 12/20/2018   Total Score 0           I personally reviewed results withpatient as listed below:   Diagnostic Test Results:  none     ASSESSMENT/PLAN:       ICD-10-CM    1. Health care maintenance  Z00.00    2. Heart palpitations  R00.2 TSH Reflex GH     Comprehensive metabolic panel     CBC with platelets differential     Zio Patch Holter 48 Hour Adult Pediatric     CBC with platelets differential     Comprehensive metabolic panel     TSH Reflex GH   3. Encounter for surveillance of contraceptive pills  Z30.41 levonorgestrel-ethinyl estradiol (AVIANE) 0.1-20 MG-MCG tablet   4. Perioral dermatitis  L71.0 metroNIDAZOLE (METROCREAM) 0.75 % external cream   5. Bilateral hip pain  M25.551 PHYSICAL THERAPY REFERRAL    M25.552 CANCELED: PHYSICAL THERAPY REFERRAL   6. Screening for lipid disorders  Z13.220 Lipid Profile     Lipid Profile       1.  Pap/HPV are up-to-date, last completed 12/20/2018.  Both were normal at that time.  Last Tdap was given on 7/31/2018.  2.  She is interested in pursuing this further.  Labs were completed as above.  48-hour Zio patch also ordered.  3.  She would like to switch back to the Aviane he had previously been on.  The downside of this is that she had more breakthrough bleeding and conceived 1 of her children while she was on this medication.  She is going to strongly encourage her  to have a vasectomy and still wants to take this medication.  4.  Refilled metronidazole cream as above.  5.  Referred to physical therapy.  At this time, she is not quite ready to repeat an MR arthrogram or have a referral to orthopedics, but will let me know if she changes her mind.  6.  Nonfasting lipid profile completed today.    Baylee Lyle MD  [facility] AND [facility]    Portions of this dictation were created using the Dragon  Nuance voice recognition system. Proofreading was completed but there may be errors in text.

## 2020-09-28 ENCOUNTER — HOSPITAL ENCOUNTER (OUTPATIENT)
Dept: CARDIOLOGY | Facility: OTHER | Age: 40
Discharge: HOME | End: 2020-09-28
Attending: FAMILY MEDICINE | Admitting: FAMILY MEDICINE
Payer: COMMERCIAL

## 2020-09-28 DIAGNOSIS — R00.2 HEART PALPITATIONS: ICD-10-CM

## 2020-09-28 PROCEDURE — 93227 XTRNL ECG REC<48 HR R&I: CPT | Performed by: INTERNAL MEDICINE

## 2020-09-28 PROCEDURE — 93226 XTRNL ECG REC<48 HR SCAN A/R: CPT

## 2020-10-08 ENCOUNTER — TRANSFERRED RECORDS (OUTPATIENT)
Dept: HEALTH INFORMATION MANAGEMENT | Facility: OTHER | Age: 40
End: 2020-10-08

## 2020-10-09 ENCOUNTER — TELEPHONE (OUTPATIENT)
Dept: FAMILY MEDICINE | Facility: OTHER | Age: 40
End: 2020-10-09

## 2020-10-09 NOTE — TELEPHONE ENCOUNTER
Called and verified patient full name and . Notified patient of heart monitor results.     June Carver LPN on 10/9/2020 at 8:56 AM

## 2020-11-20 ENCOUNTER — ALLIED HEALTH/NURSE VISIT (OUTPATIENT)
Dept: FAMILY MEDICINE | Facility: OTHER | Age: 40
End: 2020-11-20
Attending: FAMILY MEDICINE
Payer: COMMERCIAL

## 2020-11-20 DIAGNOSIS — Z23 NEED FOR PROPHYLACTIC VACCINATION AND INOCULATION AGAINST INFLUENZA: Primary | ICD-10-CM

## 2020-11-20 PROCEDURE — 90471 IMMUNIZATION ADMIN: CPT

## 2020-11-20 PROCEDURE — 90686 IIV4 VACC NO PRSV 0.5 ML IM: CPT

## 2020-12-30 ENCOUNTER — TRANSFERRED RECORDS (OUTPATIENT)
Dept: HEALTH INFORMATION MANAGEMENT | Facility: OTHER | Age: 40
End: 2020-12-30

## 2021-02-01 ENCOUNTER — TRANSFERRED RECORDS (OUTPATIENT)
Dept: HEALTH INFORMATION MANAGEMENT | Facility: OTHER | Age: 41
End: 2021-02-01

## 2021-04-06 ENCOUNTER — TRANSFERRED RECORDS (OUTPATIENT)
Dept: HEALTH INFORMATION MANAGEMENT | Facility: OTHER | Age: 41
End: 2021-04-06

## 2021-05-04 ENCOUNTER — OFFICE VISIT (OUTPATIENT)
Dept: FAMILY MEDICINE | Facility: OTHER | Age: 41
End: 2021-05-04
Attending: FAMILY MEDICINE
Payer: COMMERCIAL

## 2021-05-04 VITALS
DIASTOLIC BLOOD PRESSURE: 80 MMHG | RESPIRATION RATE: 20 BRPM | SYSTOLIC BLOOD PRESSURE: 122 MMHG | OXYGEN SATURATION: 94 % | WEIGHT: 167 LBS | BODY MASS INDEX: 26.84 KG/M2 | HEIGHT: 66 IN | HEART RATE: 71 BPM | TEMPERATURE: 98 F

## 2021-05-04 DIAGNOSIS — M53.3 CHRONIC SI JOINT PAIN: ICD-10-CM

## 2021-05-04 DIAGNOSIS — G89.29 CHRONIC SI JOINT PAIN: ICD-10-CM

## 2021-05-04 DIAGNOSIS — R21 RASH: Primary | ICD-10-CM

## 2021-05-04 DIAGNOSIS — M25.50 MULTIPLE JOINT PAIN: ICD-10-CM

## 2021-05-04 PROBLEM — Z86.59 PERSONAL HISTORY OF OTHER MENTAL DISORDER: Status: RESOLVED | Noted: 2018-01-31 | Resolved: 2021-05-04

## 2021-05-04 LAB
CRP SERPL-MCNC: 1.5 MG/L
ERYTHROCYTE [SEDIMENTATION RATE] IN BLOOD BY WESTERGREN METHOD: 6 MM/H (ref 1–15)
RHEUMATOID FACT SER NEPH-ACNC: <14 IU/ML (ref 0–20)
URATE SERPL-MCNC: 3.4 MG/DL (ref 4.4–7.6)

## 2021-05-04 PROCEDURE — 36415 COLL VENOUS BLD VENIPUNCTURE: CPT | Mod: ZL | Performed by: FAMILY MEDICINE

## 2021-05-04 PROCEDURE — 86038 ANTINUCLEAR ANTIBODIES: CPT | Mod: ZL | Performed by: FAMILY MEDICINE

## 2021-05-04 PROCEDURE — 99214 OFFICE O/P EST MOD 30 MIN: CPT | Performed by: FAMILY MEDICINE

## 2021-05-04 PROCEDURE — 84550 ASSAY OF BLOOD/URIC ACID: CPT | Mod: ZL | Performed by: FAMILY MEDICINE

## 2021-05-04 PROCEDURE — 81374 HLA I TYPING 1 ANTIGEN LR: CPT | Mod: ZL | Performed by: FAMILY MEDICINE

## 2021-05-04 PROCEDURE — 86431 RHEUMATOID FACTOR QUANT: CPT | Mod: ZL | Performed by: FAMILY MEDICINE

## 2021-05-04 PROCEDURE — 86200 CCP ANTIBODY: CPT | Mod: ZL | Performed by: FAMILY MEDICINE

## 2021-05-04 PROCEDURE — 86140 C-REACTIVE PROTEIN: CPT | Mod: ZL | Performed by: FAMILY MEDICINE

## 2021-05-04 PROCEDURE — 86618 LYME DISEASE ANTIBODY: CPT | Mod: ZL | Performed by: FAMILY MEDICINE

## 2021-05-04 PROCEDURE — 85652 RBC SED RATE AUTOMATED: CPT | Mod: ZL | Performed by: FAMILY MEDICINE

## 2021-05-04 ASSESSMENT — PAIN SCALES - GENERAL: PAINLEVEL: EXTREME PAIN (8)

## 2021-05-04 ASSESSMENT — ENCOUNTER SYMPTOMS
SHORTNESS OF BREATH: 0
COUGH: 0
CHILLS: 0
ARTHRALGIAS: 1
FEVER: 0

## 2021-05-04 ASSESSMENT — MIFFLIN-ST. JEOR: SCORE: 1441.32

## 2021-05-04 NOTE — NURSING NOTE
"Chief Complaint   Patient presents with     Derm Problem     rash and joints ache     Patient has had a rash on her toes and upper chest and arms . Toes have gone on for 3 years. They get inflamed and skin peels and then goes away.Her joints have been painful at times.    Initial /80 (BP Location: Right arm, Patient Position: Sitting, Cuff Size: Adult Regular)   Pulse 71   Temp 98  F (36.7  C) (Tympanic)   Resp 20   Ht 1.664 m (5' 5.5\")   Wt 75.8 kg (167 lb)   SpO2 94%   BMI 27.37 kg/m   Estimated body mass index is 27.37 kg/m  as calculated from the following:    Height as of this encounter: 1.664 m (5' 5.5\").    Weight as of this encounter: 75.8 kg (167 lb).  Medication Reconciliation: complete    Jessa Meyer LPN  "

## 2021-05-04 NOTE — PROGRESS NOTES
"  SUBJECTIVE:   Nursing Notes:   Jessa Meyer LPN  5/4/2021  3:59 PM  Sign at exiting of workspace  Chief Complaint   Patient presents with     Derm Problem     rash and joints ache     Patient has had a rash on her toes and upper chest and arms . Toes have gone on for 3 years. They get inflamed and skin peels and then goes away.Her joints have been painful at times.    Initial /80 (BP Location: Right arm, Patient Position: Sitting, Cuff Size: Adult Regular)   Pulse 71   Temp 98  F (36.7  C) (Tympanic)   Resp 20   Ht 1.664 m (5' 5.5\")   Wt 75.8 kg (167 lb)   SpO2 94%   BMI 27.37 kg/m   Estimated body mass index is 27.37 kg/m  as calculated from the following:    Height as of this encounter: 1.664 m (5' 5.5\").    Weight as of this encounter: 75.8 kg (167 lb).  Medication Reconciliation: complete    Jessa Meyer LPN      Christine Moncada is a 41 year old female who presents to clinic today for a complaint of a couple of things.    She has had a rash on her upper chest and arms.  Over the past few months.  Flares up at times and gets a more plaque-like appearance.  It is fairly well controlled right now.  She had googled images of rashes and wonders if it could be psoriasis.  It was very itchy to begin with.  She has made many dietary changes, eating a mostly paleo diet and has noticed that when she is stricter with this diet, she feels that the rash is much better controlled.    She has had issues with a different appearing rash on the 2nd-4th toes of her left foot as well since she was pregnant with her almost 3 year old.  At first, she thought she had athletes foot.  Treating for athletes foot with antifungals did not help.  It went away spontaneously after her baby was born, then it came back.  The toes get inflamed and swell, then her skin peels, then the rash improves.  Sometimes gets little red spots that get very inflamed and tender prior to the peeling. They get inflamed, then her toes " swell, the skin peels and then they look like normal again.  It only affects her 2nd-4th toes on just her left foot.  Does not get into the creases or web spaces of her toes.  initially her toes itch before the inflammation starts.  She is wondering if this could be psoriasis after getting the rash on her chest.  Her toes get swollen and inflamed/painful and wonders if this is dactylitis from psoriasis.    She has had intermittent joint pain.  This spring when it was very dark and gloomy, she had a lot of joint pain in particular.  Her left hand 3rd finger DIP has been very sore.  Toes hurt at times.  She has had issues with chronic left SI joint pain as well.  She has had a prior hip labrum repair.    HPI    I personally reviewed medications/allergies/history listed below:    Patient Active Problem List    Diagnosis Date Noted     Hip disease 2014     Priority: Medium     Dyspareunia (CODE) 2013     Priority: Medium     Past Medical History:   Diagnosis Date     Encounter for full-term uncomplicated delivery     , VAVD x2     Joint disorder     2014     Major depressive disorder, single episode     remote hx     Other idiopathic scoliosis, site unspecified     in childhood     Personal history of other diseases of the nervous system and sense organs     2001,abdominal, quiescent      Past Surgical History:   Procedure Laterality Date     [place] HIP ARTHROSCOPY, DX      hip labrum repair     Family History   Problem Relation Age of Onset     Other - See Comments Mother          due to brother's large size     Social History     Tobacco Use     Smoking status: Never Smoker     Smokeless tobacco: Never Used     Tobacco comment: Quit smoking: In childhood home   Substance Use Topics     Alcohol use: No     Alcohol/week: 0.0 standard drinks     Social History     Social History Narrative     in 2005.  Masters Degree from the [employer].  Elementary school  "teacher.  Her  his a .    They have 3 sons.   - Reyes      Son - Balwinder      Son - Manpreet      Son - Elia     Current Outpatient Medications   Medication Sig Dispense Refill     levonorgestrel-ethinyl estradiol (AVIANE) 0.1-20 MG-MCG tablet Take 1 tablet by mouth daily 84 tablet 4     metroNIDAZOLE (METROCREAM) 0.75 % external cream Apply topically 2 times daily 45 g 3     Allergies   Allergen Reactions     Pollen Extract      Other reaction(s): Runny Nose  Sneezy, itchy eyes     Amoxicillin Rash       Review of Systems   Constitutional: Negative for chills and fever.   Respiratory: Negative for cough and shortness of breath.    Musculoskeletal: Positive for arthralgias.   Skin: Positive for rash.        OBJECTIVE:     /80 (BP Location: Right arm, Patient Position: Sitting, Cuff Size: Adult Regular)   Pulse 71   Temp 98  F (36.7  C) (Tympanic)   Resp 20   Ht 1.664 m (5' 5.5\")   Wt 75.8 kg (167 lb)   SpO2 94%   BMI 27.37 kg/m    Body mass index is 27.37 kg/m .  Physical Exam  Constitutional:       Appearance: Normal appearance.   HENT:      Head: Normocephalic and atraumatic.   Eyes:      Extraocular Movements: Extraocular movements intact.      Pupils: Pupils are equal, round, and reactive to light.   Musculoskeletal:      Comments: No visible swelling of joints of her hands currently.   Skin:     Comments: On her chest and upper arms are faded circular patches of pink rash.  There is no current plaque or scaling overlying these areas.    Her left foot 2nd-4th toes are slightly more swollen that her other toes, but no current rash is present.    No pitting of nails of her fingers or toes.     Neurological:      Mental Status: She is alert.           PHQ-9 SCORE 11/23/2015 3/7/2018 12/20/2018   PHQ-9 Total Score 0 0 0       PHQ-2 Score:     PHQ-2 ( 1999 Pfizer) 5/4/2021 9/4/2020   Q1: Little interest or pleasure in doing things 0 0   Q2: Feeling down, " depressed or hopeless 0 0   PHQ-2 Score 0 0       DANIELLE-7 SCORE 12/20/2018   Total Score 0         No flowsheet data found.      I personally reviewed results withpatient as listed below:   Diagnostic Test Results:  none     ASSESSMENT/PLAN:       ICD-10-CM    1. Rash  R21    2. Multiple joint pain  M25.50 Cyclic Citrullinated Peptide Antibody IgG     Rheumatoid factor     Sedimentation Rate (ESR)     CRP inflammation     Anti Nuclear Lissett IgG by IFA with Reflex     Lyme Disease Ab with reflex to WB Serum     Uric acid     HLA-B27 Typing     HLA-B27 Typing     Uric acid     Lyme Disease Ab with reflex to WB Serum     CRP inflammation     Sedimentation Rate (ESR)     Rheumatoid factor     Cyclic Citrullinated Peptide Antibody IgG   3. Chronic SI joint pain  M53.3 Cyclic Citrullinated Peptide Antibody IgG    G89.29 Rheumatoid factor     Sedimentation Rate (ESR)     CRP inflammation     Anti Nuclear Lissett IgG by IFA with Reflex     Lyme Disease Ab with reflex to WB Serum     Uric acid     HLA-B27 Typing     HLA-B27 Typing     Uric acid     Lyme Disease Ab with reflex to WB Serum     CRP inflammation     Sedimentation Rate (ESR)     Rheumatoid factor     Cyclic Citrullinated Peptide Antibody IgG       1.  Discussed that her rash could be psoriasis that is quiescent vs eczema, vs other.  She feels that her diet can certainly control her rash, which sounds suspicious for a more inflammatory cause.  Discussed that she could have a topical steroid prescription available if she would like which would help both of these possibilities.  Discussed that she could consider a punch biopsy to confirm diagnosis or consider seeing Dermatology as well.  She will consider her options and let me know if she wishes to pursue anything further.  2.  Labs will be completed as above.  Discussed could consider Rheumatology referral based on these results.  3.  Labs ordered as per #2.  Discussed she could consider MRI of her pelvis looking more  specifically for bone marrow edema that might be suggestive of sacroiliitis.  This could be supportive of a diagnosis of psoriatic arthritis as well.  She will consider options and let me know if she wants anything further ordered in the future..      Baylee Lyle MD  [facility] AND [facility]

## 2021-05-05 LAB — B BURGDOR IGG+IGM SER QL: 0.12 (ref 0–0.89)

## 2021-05-06 LAB
ANA SER QL IF: NEGATIVE
CCP AB SER IA-ACNC: 1 U/ML

## 2021-05-17 ENCOUNTER — TRANSFERRED RECORDS (OUTPATIENT)
Dept: HEALTH INFORMATION MANAGEMENT | Facility: OTHER | Age: 41
End: 2021-05-17

## 2021-05-17 LAB
B LOCUS: NORMAL
B27TEST METHOD: NORMAL

## 2021-06-21 ENCOUNTER — TRANSFERRED RECORDS (OUTPATIENT)
Dept: HEALTH INFORMATION MANAGEMENT | Facility: OTHER | Age: 41
End: 2021-06-21

## 2021-07-21 ENCOUNTER — ALLIED HEALTH/NURSE VISIT (OUTPATIENT)
Dept: OBGYN | Facility: OTHER | Age: 41
End: 2021-07-21
Attending: OBSTETRICS & GYNECOLOGY
Payer: COMMERCIAL

## 2021-07-21 ENCOUNTER — TRANSFERRED RECORDS (OUTPATIENT)
Dept: HEALTH INFORMATION MANAGEMENT | Facility: OTHER | Age: 41
End: 2021-07-21

## 2021-07-21 VITALS — HEIGHT: 67 IN | BODY MASS INDEX: 26.29 KG/M2 | WEIGHT: 167.5 LBS

## 2021-07-21 DIAGNOSIS — O36.80X0 ENCOUNTER TO DETERMINE FETAL VIABILITY OF PREGNANCY, SINGLE OR UNSPECIFIED FETUS: ICD-10-CM

## 2021-07-21 DIAGNOSIS — Z32.00 POSSIBLE PREGNANCY, NOT YET CONFIRMED: Primary | ICD-10-CM

## 2021-07-21 LAB — HCG UR QL: POSITIVE

## 2021-07-21 PROCEDURE — 99207 PR OB VISIT-NO CHARGE - GICH ONLY: CPT

## 2021-07-21 PROCEDURE — 81025 URINE PREGNANCY TEST: CPT | Mod: ZL

## 2021-07-21 ASSESSMENT — MIFFLIN-ST. JEOR: SCORE: 1463.15

## 2021-07-21 ASSESSMENT — PATIENT HEALTH QUESTIONNAIRE - PHQ9: SUM OF ALL RESPONSES TO PHQ QUESTIONS 1-9: 0

## 2021-07-21 NOTE — PROGRESS NOTES
HPI:    This is a 41 year old female patient,  who presents today for OB Intake visit. Patient reports positive pregnancy test at home.     Obstetrical history and OB Questionnaire updated to the best of this nurse's ability based on patient report. PHQ-9 depression screening and routine Domestic Abuse screening completed. All immediate questions and concerns answered.    FOOD SECURITY SCREENING QUESTIONS  Hunger Vital Signs:  Within the past 12 months we worried whether our food would run out before we got money to buy more. Never  Within the past 12 months the food we bought just didn't last and we didn't have money to get more. Never    Last menstrual period is reported as Patient's last menstrual period was 06/10/2021. ZACH based on LMP is Estimated Date of Delivery: Mar 17, 2022.  Her cycles are irregular.  Her last menstrual period was normal.   Since her LMP, she has experienced  pelvic pain and breast tenderness.       OBSTETRIC HISTORY:    OB History    Para Term  AB Living   6 3 3 0 2 3   SAB TAB Ectopic Multiple Live Births   2 0 0 0 3      # Outcome Date GA Lbr Yao/2nd Weight Sex Delivery Anes PTL Lv   6 Current            5 SAB 19 9w4d    SAB      4 Term 18 41w2d 04:03 / 00:13 3.731 kg (8 lb 3.6 oz) M Vag-Spont INT N ANGELI      Name: CAYLA DALY      Apgar1: 8  Apgar5: 9   3 Term 16 38w4d  3.654 kg (8 lb 0.9 oz) M Vag-Vacuum INT N ANGELI      Complications: Excessive Vaginal Bleeding, Retained placenta      Apgar1: 9  Apgar5: 9   2 SAB 10/27/14 9w0d   U SAB  N    1 Term 12/15/09 39w5d  3.884 kg (8 lb 9 oz) M IVD INT N ANGELI      Name: Balwinder      Obstetric Comments   Pregnancy risk factors include: AMA, History of PPH, vacuum delivery x 2, cardiac dysrhythmia.       Age of first pregnancy: 29  Previous OB Provider: Austin Kerr MD, Baylee Lyle MD, Dr. Bertrand Johnson MD, FACOG, Brandy Arora MD   Previous Delivering Clinic:  [facility]  Release of Records: n/a    Current delivery plan: [facility]  Preferred OB Provider: Dr. Josey Sanchez MD   Current Primary Care Provider: Baylee Lyle MD   Pediatrician: Baylee Lyle MD     Additional History: AMA, Hx of PPH w/o transfusion (second delivery), VAVD x2, Cardiac dysrhythmia. Patient has rectocele and possible urethrocele and uterine prolapse. Symptomatic since her last delivery.    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   Family and father of baby is supportive of current pregnancy.  Past Medical History of Father of Baby:No significant medical history    Past History:  Her past medical history   Past Medical History:   Diagnosis Date     Abdominal migraine     ,abdominal, quiescent     Encounter for full-term uncomplicated delivery     , VAVD x2     Joint disorder     2014     Major depressive disorder, single episode     remote hx     Other idiopathic scoliosis, site unspecified     in childhood   .      Her past surgical history:   Past Surgical History:   Procedure Laterality Date     [place] HIP ARTHROSCOPY, DX      hip labrum repair       She has a history of  PPH (second delivery)    Since her LMP she denies use of alcohol, tobacco and street drugs.    Pap smear history: YES - updated in Problem List and Health Maintenance accordingly    STD/STI history: No STD history    STD/STI symptoms: no noticeable symptoms     Past medical, surgical, social and family history were reviewed and updated in EPIC.    Medications reviewed by this nurse. Current medication list:  Current Outpatient Medications   Medication Sig Dispense Refill     Prenatal MV-Min-Fe Fum-FA-DHA (PRENATAL 1 PO) NO iron        metroNIDAZOLE (METROCREAM) 0.75 % external cream Apply topically 2 times daily (Patient not taking: Reported on 2021) 45 g 3     The following medications were recommended to be discontinued due to Pregnancy Category D status: none      Risk factors:  Moderate and moderately severe risks (consult with OB/Gyn)  Previous fetal or  demise: No  History of  delivery: No  History of heart disease Class I: No  Severe anemia, unresponsive to iron therapy: No  Pelvic mass or neoplasm: No  Previous : No  Hyper/hypothyroidism: No  History of postpartum hemorrhage requiring transfusion:No  History of Placenta Accreta: No    High Risk (Pregnancy managed by OB/Gyn)  Multiple pregnancy: No  Pre-gestational diabetes: No  Chronic Hypertension: No  Renal Failure: No  Heart disease, class II or greater: No  Rh Isoimmunization: No  Chronic active hepatitis: No  Convulsive disorder, poorly controlled: No  Isoimmune thrombocytopenia: No  Pre-term premature rupture of membranes: No  Lupus or other autoimmune disorder: No  Human Immunodeficiency Virus: No      hCG Urine Qualitative   Date Value Ref Range Status   2021 Positive (A) Negative Final     Comment:     This test is for screening purposes.  Results should be interpreted along with the clinical picture.  Confirmation testing is available if warranted by ordering [id number], HCG Quantitative Pregnancy.       ASSESSMENT/PLAN:       ICD-10-CM    1. Possible pregnancy, not yet confirmed  Z32.00 HCG qualitative urine   2. Encounter to determine fetal viability of pregnancy, single or unspecified fetus  O36.80X0        41 year old , 5w6d of pregnancy with ZACH of 3/17/2022, by Last Menstrual Period    Urine pregnancy test was completed today and results are noted above.    Per standing orders and scope of practice of this nurse, patient will have the following orders placed and completed prior to initial OB visit with the appropriate  provider:    --early ultrasound for dating and viability ordered for 6+ weeks gestation based on LMP    --Quantitative Beta HCG and progesterone monitoring if indicated    Counseling given:     - Recommended weight gain for pregnancy: 15-25 lbs.   BMI < 18.5  28-40 lbs   18.5 - 24.9 25-35   25 - 29.9 15-25   > 30  < 15       PLAN/PATIENT INSTRUCTIONS:    Follow up in 2-4 weeks.  Normal exercise.  Normal sexual activity.  Prenatal vitamins.  Anticipated weight gain.    follow-up appointment with Dr. Josey Sanchez MD for pre- care, take multivitamin or pre- vitamins and OB Education packet given    Tori Ricci RN.................................................. 2021 1:33 PM

## 2021-08-17 ENCOUNTER — PRENATAL OFFICE VISIT (OUTPATIENT)
Dept: OBGYN | Facility: OTHER | Age: 41
End: 2021-08-17
Attending: OBSTETRICS & GYNECOLOGY
Payer: COMMERCIAL

## 2021-08-17 ENCOUNTER — HOSPITAL ENCOUNTER (OUTPATIENT)
Dept: ULTRASOUND IMAGING | Facility: OTHER | Age: 41
End: 2021-08-17
Attending: OBSTETRICS & GYNECOLOGY
Payer: COMMERCIAL

## 2021-08-17 VITALS
WEIGHT: 171 LBS | HEIGHT: 65 IN | DIASTOLIC BLOOD PRESSURE: 70 MMHG | SYSTOLIC BLOOD PRESSURE: 110 MMHG | HEART RATE: 60 BPM | BODY MASS INDEX: 28.49 KG/M2

## 2021-08-17 DIAGNOSIS — O09.529 SUPERVISION OF HIGH-RISK PREGNANCY OF ELDERLY MULTIGRAVIDA: Primary | ICD-10-CM

## 2021-08-17 DIAGNOSIS — O36.80X0 ENCOUNTER TO DETERMINE FETAL VIABILITY OF PREGNANCY, SINGLE OR UNSPECIFIED FETUS: ICD-10-CM

## 2021-08-17 LAB
ABO/RH(D): NORMAL
ALBUMIN MFR UR ELPH: <4 MG/DL (ref 1–14)
ALBUMIN SERPL-MCNC: 4.1 G/DL (ref 3.5–5.7)
ALP SERPL-CCNC: 39 U/L (ref 34–104)
ALT SERPL W P-5'-P-CCNC: 11 U/L (ref 7–52)
ANION GAP SERPL CALCULATED.3IONS-SCNC: 8 MMOL/L (ref 3–14)
ANTIBODY SCREEN: NEGATIVE
AST SERPL W P-5'-P-CCNC: 15 U/L (ref 13–39)
BILIRUB SERPL-MCNC: 0.4 MG/DL (ref 0.3–1)
BUN SERPL-MCNC: 16 MG/DL (ref 7–25)
CALCIUM SERPL-MCNC: 9 MG/DL (ref 8.6–10.3)
CHLORIDE BLD-SCNC: 103 MMOL/L (ref 98–107)
CO2 SERPL-SCNC: 25 MMOL/L (ref 21–31)
CREAT SERPL-MCNC: 0.77 MG/DL (ref 0.6–1.2)
CREAT UR-MCNC: 23 MG/DL
ERYTHROCYTE [DISTWIDTH] IN BLOOD BY AUTOMATED COUNT: 12.7 % (ref 10–15)
GFR SERPL CREATININE-BSD FRML MDRD: >90 ML/MIN/1.73M2
GLUCOSE BLD-MCNC: 80 MG/DL (ref 70–105)
HCT VFR BLD AUTO: 42.4 % (ref 35–47)
HGB BLD-MCNC: 13.9 G/DL (ref 11.7–15.7)
MCH RBC QN AUTO: 29.6 PG (ref 26.5–33)
MCHC RBC AUTO-ENTMCNC: 32.8 G/DL (ref 31.5–36.5)
MCV RBC AUTO: 90 FL (ref 78–100)
PLATELET # BLD AUTO: 303 10E3/UL (ref 150–450)
POTASSIUM BLD-SCNC: 3.9 MMOL/L (ref 3.5–5.1)
PROT SERPL-MCNC: 7 G/DL (ref 6.4–8.9)
PROT/CREAT 24H UR: NORMAL MG/G{CREAT}
RBC # BLD AUTO: 4.7 10E6/UL (ref 3.8–5.2)
SODIUM SERPL-SCNC: 136 MMOL/L (ref 134–144)
SPECIMEN EXPIRATION DATE: NORMAL
WBC # BLD AUTO: 8.1 10E3/UL (ref 4–11)

## 2021-08-17 PROCEDURE — 84156 ASSAY OF PROTEIN URINE: CPT | Mod: ZL | Performed by: OBSTETRICS & GYNECOLOGY

## 2021-08-17 PROCEDURE — 99207 PR OB VISIT-NO CHARGE - GICH ONLY: CPT | Performed by: OBSTETRICS & GYNECOLOGY

## 2021-08-17 PROCEDURE — 82040 ASSAY OF SERUM ALBUMIN: CPT | Mod: ZL | Performed by: OBSTETRICS & GYNECOLOGY

## 2021-08-17 PROCEDURE — 86780 TREPONEMA PALLIDUM: CPT | Mod: ZL | Performed by: OBSTETRICS & GYNECOLOGY

## 2021-08-17 PROCEDURE — 86901 BLOOD TYPING SEROLOGIC RH(D): CPT | Mod: ZL | Performed by: OBSTETRICS & GYNECOLOGY

## 2021-08-17 PROCEDURE — 87086 URINE CULTURE/COLONY COUNT: CPT | Mod: ZL | Performed by: OBSTETRICS & GYNECOLOGY

## 2021-08-17 PROCEDURE — 85014 HEMATOCRIT: CPT | Mod: ZL | Performed by: OBSTETRICS & GYNECOLOGY

## 2021-08-17 PROCEDURE — 76801 OB US < 14 WKS SINGLE FETUS: CPT

## 2021-08-17 PROCEDURE — 86762 RUBELLA ANTIBODY: CPT | Mod: ZL | Performed by: OBSTETRICS & GYNECOLOGY

## 2021-08-17 PROCEDURE — 87340 HEPATITIS B SURFACE AG IA: CPT | Mod: ZL | Performed by: OBSTETRICS & GYNECOLOGY

## 2021-08-17 PROCEDURE — 36415 COLL VENOUS BLD VENIPUNCTURE: CPT | Mod: ZL | Performed by: OBSTETRICS & GYNECOLOGY

## 2021-08-17 PROCEDURE — 87389 HIV-1 AG W/HIV-1&-2 AB AG IA: CPT | Mod: ZL | Performed by: OBSTETRICS & GYNECOLOGY

## 2021-08-17 RX ORDER — GLUCOSAMINE HCL/CHONDROITIN SU 500-400 MG
CAPSULE ORAL
Qty: 400 EACH | Refills: 3 | Status: SHIPPED | OUTPATIENT
Start: 2021-08-17 | End: 2023-06-06

## 2021-08-17 RX ORDER — LANCETS
EACH MISCELLANEOUS
Qty: 400 EACH | Refills: 3 | Status: SHIPPED | OUTPATIENT
Start: 2021-08-17 | End: 2023-06-06

## 2021-08-17 ASSESSMENT — PAIN SCALES - GENERAL: PAINLEVEL: NO PAIN (0)

## 2021-08-17 ASSESSMENT — MIFFLIN-ST. JEOR: SCORE: 1451.53

## 2021-08-17 NOTE — PROGRESS NOTES
New Obstetrics Visit    HPI: 41 year old  at 9w5d by LMP c/w 9w5d US here today for initial OB visit. No cramping or VB since LMP. Nausea improving. Fatigue still there but starting to get better. Has been feeling shaky in the morning, similar to symptoms she had in her third trimester of last pregnancy. Wonders if she has a glucose issue.     OBHx  OB History    Para Term  AB Living   6 3 3 0 2 3   SAB TAB Ectopic Multiple Live Births   2 0 0 0 3      # Outcome Date GA Lbr Yao/2nd Weight Sex Delivery Anes PTL Lv   6 Current            5 SAB 19 9w4d    SAB      4 Term 18 41w2d 04:03 / 00:13 3.731 kg (8 lb 3.6 oz) M Vag-Spont INT N ANGELI      Name: MALIKA,BABY1 ANGÉLICA CAMPOS      Apgar1: 8  Apgar5: 9   3 Term 16 38w4d  3.654 kg (8 lb 0.9 oz) M Vag-Vacuum INT N ANGELI      Complications: Excessive Vaginal Bleeding, Retained placenta      Name: Manpreet      Apgar1: 9  Apgar5: 9   2 SAB 10/27/14 9w0d   U SAB  N    1 Term 12/15/09 39w5d  3.884 kg (8 lb 9 oz) M IVD INT N ANGELI      Name: Balwinder      Obstetric Comments   Pregnancy risk factors include: AMA, History of PPH, vacuum delivery x 2, cardiac dysrhythmia.       PMHx:   Past Medical History:   Diagnosis Date     Abdominal migraine     ,abdominal, quiescent     Encounter for full-term uncomplicated delivery     VAVD x2     Joint disorder     2014     Major depressive disorder, single episode     remote hx     Other idiopathic scoliosis, site unspecified     in childhood      PSHx:   Past Surgical History:   Procedure Laterality Date     IA HIP ARTHROSCOPY, DX      hip labrum repair      Meds:   Current Outpatient Medications   Medication     alcohol swab prep pads     blood glucose (NO BRAND SPECIFIED) test strip     blood glucose calibration (NO BRAND SPECIFIED) solution     blood glucose monitoring (NO BRAND SPECIFIED) meter device kit     metroNIDAZOLE (METROCREAM) 0.75 % external cream     Prenatal MV-Min-Fe Fum-FA-DHA  "(PRENATAL 1 PO)     thin (NO BRAND SPECIFIED) lancets     No current facility-administered medications for this visit.     Allergies:     Allergies   Allergen Reactions     Pollen Extract      Other reaction(s): Runny Nose  Sneezy, itchy eyes     Amoxicillin Rash       SocHx:   Social History     Tobacco Use     Smoking status: Never Smoker     Smokeless tobacco: Never Used     Tobacco comment: Quit smoking: In childhood home   Vaping Use     Vaping Use: Never used   Substance Use Topics     Alcohol use: Not Currently     Drug use: Never     Lives with her  and 3 sons. Is a teacher.    FamHx:   Family History   Problem Relation Age of Onset     Other - See Comments Mother          due to brother's large size        ROS: 10-Point ROS negative except as noted in HPI      Physical Exam  /70 (BP Location: Right arm, Patient Position: Sitting, Cuff Size: Adult Regular)   Pulse 60   Ht 1.651 m (5' 5\")   Wt 77.6 kg (171 lb)   LMP 06/10/2021 (Within Days)   Breastfeeding No   BMI 28.46 kg/m    Body mass index is 28.46 kg/m .  Gen: Well-appearing, NAD  HEENT: Normocephalic, atraumatic  Neck: Thyroid is not enlarged, no appreciable masses palpated. Non-tender  CV:  RRR, no m/r/g auscultated  Pulm: CTAB, no w/r/r auscultated  Abd: Soft, non-tender, non-distended  Ext: No LE edema, extremities warm and well perfused    Pelvic:  Deferred per patient request      Assessment/Plan:  Ms. Christine Moncada is a 41 year old  at 9w5d by LMP c/w 9w5d US, here for new OB visit. Pregnancy is complicated by AMA, hx of VAVD x2, hx of retained placenta with PPH.  1. AMA: discussed increased risks of aneuploidy, GDM, hypertension. Declines screening. Recommend starting ASA 81mg after 12 weeks- will discuss next visit. Discussed growth US at 32 weeks, weekly NSTs at 36 weeks, delivery by ZACH.  Baseline HELLP labs today.  2. Hx of VAVD x2  3. Hx of retained placenta with PPH  4. New OB labs ord'd- will get " JAY/Herrera next visit  5. Genetics: declines  6. Imaging: dating US at 9w5d. Will plan for level 2 US  7. Immunizations: s/p COVID   8. ?symptomatic hypoglycemia: will send for testing supplies    Follow up in 4 weeks.    Josey Sanchez MD  OB/GYN  8/17/2021 11:41 AM

## 2021-08-18 LAB
HBV SURFACE AG SERPL QL IA: NONREACTIVE
HIV 1+2 AB+HIV1 P24 AG SERPL QL IA: NONREACTIVE
RUBV IGG SERPL QL IA: 13.9 INDEX
RUBV IGG SERPL QL IA: POSITIVE
T PALLIDUM AB SER QL: NONREACTIVE

## 2021-08-19 LAB — BACTERIA UR CULT: NO GROWTH

## 2021-09-15 ENCOUNTER — PRENATAL OFFICE VISIT (OUTPATIENT)
Dept: OBGYN | Facility: OTHER | Age: 41
End: 2021-09-15
Attending: OBSTETRICS & GYNECOLOGY
Payer: COMMERCIAL

## 2021-09-15 VITALS
DIASTOLIC BLOOD PRESSURE: 60 MMHG | BODY MASS INDEX: 29.12 KG/M2 | WEIGHT: 175 LBS | HEART RATE: 60 BPM | SYSTOLIC BLOOD PRESSURE: 110 MMHG

## 2021-09-15 DIAGNOSIS — O09.529 SUPERVISION OF HIGH-RISK PREGNANCY OF ELDERLY MULTIGRAVIDA: Primary | ICD-10-CM

## 2021-09-15 PROCEDURE — 99207 PR OB VISIT-NO CHARGE - GICH ONLY: CPT | Performed by: OBSTETRICS & GYNECOLOGY

## 2021-09-15 ASSESSMENT — PAIN SCALES - GENERAL: PAINLEVEL: NO PAIN (0)

## 2021-09-15 NOTE — NURSING NOTE
Chief Complaint   Patient presents with     Prenatal Care     13w6d     Offers no complaints    Medication Reconciliation: completed   Sasha Gustafson LPN  9/15/2021 4:09 PM

## 2021-09-15 NOTE — PROGRESS NOTES
Return OB Visit    S: Patient is feeling much better over the last few weeks. No cramping or VB. Has been keeping track of her blood sugars- feels shaky when they get into the 90s.    Fasting glucoses , mostly low 90s  All postprandials <120    O: /60 (BP Location: Right arm, Patient Position: Sitting, Cuff Size: Adult Large)   Pulse 60   Wt 79.4 kg (175 lb)   LMP 06/10/2021 (Within Days)   Breastfeeding No   BMI 29.12 kg/m    Gen: Well-appearing, NAD  See OB Flowsheet    A/P:  Christine Moncada is a 41 year old  at 13w6d by LMP c/w 9w5d US, here for return OB visit.  AMA: declined aneuploidy screening. Normal baseline HELLP labs. Recommend starting ASA 81mg- patient declines. Plan for growth US at 32 weeks, weekly NSTs at 36 weeks, delivery by ZACH.  Hx of VAVD x2  Hx of retained placenta with PPH    PNC: -Rh positive, Rubella immune  Genetics: declined  Imaging: dating US at 9w5d  Immunizations: s/p COVID series.   RTC 5 weeks with MFM scan    Josey Sanchez MD  OB/GYN  9/15/2021 4:08 PM

## 2021-10-03 ENCOUNTER — HEALTH MAINTENANCE LETTER (OUTPATIENT)
Age: 41
End: 2021-10-03

## 2021-10-13 ENCOUNTER — PRENATAL OFFICE VISIT (OUTPATIENT)
Dept: OBGYN | Facility: OTHER | Age: 41
End: 2021-10-13
Attending: OBSTETRICS & GYNECOLOGY
Payer: COMMERCIAL

## 2021-10-13 VITALS
BODY MASS INDEX: 29.45 KG/M2 | SYSTOLIC BLOOD PRESSURE: 112 MMHG | DIASTOLIC BLOOD PRESSURE: 64 MMHG | WEIGHT: 177 LBS | HEART RATE: 72 BPM

## 2021-10-13 DIAGNOSIS — O09.529 SUPERVISION OF HIGH-RISK PREGNANCY OF ELDERLY MULTIGRAVIDA: Primary | ICD-10-CM

## 2021-10-13 LAB
C TRACH DNA SPEC QL PROBE+SIG AMP: NEGATIVE
N GONORRHOEA DNA SPEC QL NAA+PROBE: NEGATIVE

## 2021-10-13 PROCEDURE — 99207 PR OB VISIT-NO CHARGE - GICH ONLY: CPT | Performed by: OBSTETRICS & GYNECOLOGY

## 2021-10-13 PROCEDURE — 87491 CHLMYD TRACH DNA AMP PROBE: CPT | Mod: ZL | Performed by: OBSTETRICS & GYNECOLOGY

## 2021-10-13 ASSESSMENT — PAIN SCALES - GENERAL: PAINLEVEL: NO PAIN (0)

## 2021-10-13 NOTE — NURSING NOTE
Chief Complaint   Patient presents with     Prenatal Care     17w6d     Offers no complaints   Sasha Gustafson LPN........................10/13/2021  4:10 PM       Medication Reconciliation: completed   Sasha Gustafson LPN  10/13/2021 4:10 PM

## 2021-10-13 NOTE — PROGRESS NOTES
Return OB Visit    S: Patient is feeling better. Hasn't needed to check her glucoses as often. fastings usually mid to high-80s. No cramping or VB. Maybe seeing FM    O: /64 (BP Location: Right arm, Patient Position: Sitting, Cuff Size: Adult Regular)   Pulse 72   Wt 80.3 kg (177 lb)   LMP 06/10/2021 (Within Days)   Breastfeeding No   BMI 29.45 kg/m    Gen: Well-appearing, NAD  See OB Flowsheet    A/P:  Christine Moncada is a 41 year old  at 17w6d by LMP c/w 9w5d US, here for return OB visit.  AMA: declined aneuploidy screening. Normal baseline HELLP labs. Recommend starting ASA 81mg- patient declines. Plan for growth US at 32 weeks, weekly NSTs at 36 weeks, delivery by ZACH.  Hx of VAVD x2  Hx of retained placenta with PPH     PNC: -Rh positive, Rubella immune  Genetics: declined  Imaging: dating US at 9w5d  Immunizations: s/p COVID series. planning to get flu shot with her family  RTC 4 weeks. MFM US on 11/3    Josey Sanchez MD FACOG  OB/GYN  10/13/2021 4:27 PM

## 2021-11-03 ENCOUNTER — HOSPITAL ENCOUNTER (OUTPATIENT)
Dept: ULTRASOUND IMAGING | Facility: CLINIC | Age: 41
End: 2021-11-03
Attending: OBSTETRICS & GYNECOLOGY
Payer: COMMERCIAL

## 2021-11-03 ENCOUNTER — HOSPITAL ENCOUNTER (OUTPATIENT)
Dept: ULTRASOUND IMAGING | Facility: OTHER | Age: 41
End: 2021-11-03
Attending: OBSTETRICS & GYNECOLOGY
Payer: COMMERCIAL

## 2021-11-03 ENCOUNTER — TELEPHONE (OUTPATIENT)
Dept: OBGYN | Facility: OTHER | Age: 41
End: 2021-11-03

## 2021-11-03 ENCOUNTER — OFFICE VISIT (OUTPATIENT)
Dept: MATERNAL FETAL MEDICINE | Facility: CLINIC | Age: 41
End: 2021-11-03
Attending: OBSTETRICS & GYNECOLOGY
Payer: COMMERCIAL

## 2021-11-03 ENCOUNTER — LAB (OUTPATIENT)
Dept: LAB | Facility: OTHER | Age: 41
End: 2021-11-03
Attending: OBSTETRICS & GYNECOLOGY
Payer: COMMERCIAL

## 2021-11-03 DIAGNOSIS — O09.529 SUPERVISION OF HIGH-RISK PREGNANCY OF ELDERLY MULTIGRAVIDA: ICD-10-CM

## 2021-11-03 DIAGNOSIS — O09.522 MULTIGRAVIDA OF ADVANCED MATERNAL AGE IN SECOND TRIMESTER: Primary | ICD-10-CM

## 2021-11-03 DIAGNOSIS — O35.9XX0 SUSPECTED FETAL ANOMALY, ANTEPARTUM, SINGLE OR UNSPECIFIED FETUS: Primary | ICD-10-CM

## 2021-11-03 DIAGNOSIS — O09.529 SUPERVISION OF HIGH-RISK PREGNANCY OF ELDERLY MULTIGRAVIDA: Primary | ICD-10-CM

## 2021-11-03 PROCEDURE — 36415 COLL VENOUS BLD VENIPUNCTURE: CPT | Mod: ZL

## 2021-11-03 NOTE — PROGRESS NOTES
Called regarding MFM scan today- concerning for T18. Will get [place] drawn today. Has f/u next week. Questions answered.  Josey Sanchez MD FACOG  OB/GYN  11/3/2021 1:01 PM

## 2021-11-03 NOTE — TELEPHONE ENCOUNTER
Dr. Reich [facility] would like to go over what she found on ultra sound they just did on this patient with Dr. Quarles

## 2021-11-03 NOTE — PROGRESS NOTES
Type of service:    Telemedicine Diagnostic Assessment for ultrasound for advanced maternal age    Date of service:     Date: 11/03/21     Time service began:    8:00 AM  Time service ended:    9:00 AM    Reason:      .tel: Lack of available service in patient area    Description of basis or telemedicine appropriateness:     Consultation provided at the request of Dr. Sanchez for advice regarding the diagnosis and treatment of this patient's pregnancy complicated by advanced maternal age.  The patient's condition can be safely assessed via telemedicine.    The Mode of Transmission:    Secure interactive audio and visual telecommunication system (Video Guidance)    Location of originating and distant sites:      Originating site:   [place], [place]    Distant site:    [place], [place]    Yvrose Maldonado MD

## 2021-11-10 ENCOUNTER — PRENATAL OFFICE VISIT (OUTPATIENT)
Dept: OBGYN | Facility: OTHER | Age: 41
End: 2021-11-10
Attending: OBSTETRICS & GYNECOLOGY
Payer: COMMERCIAL

## 2021-11-10 VITALS
HEART RATE: 72 BPM | DIASTOLIC BLOOD PRESSURE: 60 MMHG | WEIGHT: 180 LBS | BODY MASS INDEX: 29.95 KG/M2 | SYSTOLIC BLOOD PRESSURE: 98 MMHG

## 2021-11-10 DIAGNOSIS — O09.529 SUPERVISION OF HIGH-RISK PREGNANCY OF ELDERLY MULTIGRAVIDA: Primary | ICD-10-CM

## 2021-11-10 PROCEDURE — 99207 PR OB VISIT-NO CHARGE - GICH ONLY: CPT | Performed by: OBSTETRICS & GYNECOLOGY

## 2021-11-10 ASSESSMENT — PAIN SCALES - GENERAL: PAINLEVEL: NO PAIN (0)

## 2021-11-10 NOTE — NURSING NOTE
Chief Complaint   Patient presents with     Prenatal Care     21w6d       Medication Reconciliation: completed   Sasha Gustafson LPN  11/10/2021 4:04 PM

## 2021-11-10 NOTE — PROGRESS NOTES
Return OB Visit    S: Patient is physically doing okay. No cramping or VB. Is just now starting to feel FM. Mentally, is doing as expected. Is taking time to process. Notes today that she has a maternal cousin that had a baby with T18, lived for 22 minutes after birth. She was in her mid-20s when she delivered that baby.     O: BP 98/60 (BP Location: Right arm, Patient Position: Sitting, Cuff Size: Adult Regular)   Pulse 72   Wt 81.6 kg (180 lb)   LMP 06/10/2021 (Within Days)   Breastfeeding No   BMI 29.95 kg/m    Gen: Well-appearing, NAD  See OB Flowsheet    A/P:  Christine Moncada is a 41 year old  at 21w6d by LMP c/w 9w5d US, here for return OB visit.  AMA: declined aneuploidy screening. Normal baseline HELLP labs. Recommend starting ASA 81mg- patient declines.     Suspected T18 on level 2 US: has MFM consult on  at [facility]. Patient leaning toward a comfort cares plan for delivery but will discuss further at her consult    Hx of VAVD x2  Hx of retained placenta with PPH     PNC: Rh positive, Rubella immune  Genetics: declined  Imaging: dating US at 9w5d  Immunizations: s/p COVID series. planning to get flu shot with her family  RTC 4 weeks    Josey Sanchez MD FACOG  OB/GYN  11/10/2021 4:09 PM

## 2021-11-12 ENCOUNTER — HOSPITAL ENCOUNTER (OUTPATIENT)
Dept: ULTRASOUND IMAGING | Facility: CLINIC | Age: 41
End: 2021-11-12
Attending: OBSTETRICS & GYNECOLOGY
Payer: COMMERCIAL

## 2021-11-12 ENCOUNTER — OFFICE VISIT (OUTPATIENT)
Dept: CARDIOLOGY | Facility: CLINIC | Age: 41
End: 2021-11-12
Payer: COMMERCIAL

## 2021-11-12 ENCOUNTER — OFFICE VISIT (OUTPATIENT)
Dept: MATERNAL FETAL MEDICINE | Facility: CLINIC | Age: 41
End: 2021-11-12
Attending: OBSTETRICS & GYNECOLOGY
Payer: COMMERCIAL

## 2021-11-12 ENCOUNTER — HOSPITAL ENCOUNTER (OUTPATIENT)
Dept: CARDIOLOGY | Facility: CLINIC | Age: 41
End: 2021-11-12
Attending: OBSTETRICS & GYNECOLOGY
Payer: COMMERCIAL

## 2021-11-12 DIAGNOSIS — O35.9XX0 SUSPECTED FETAL ANOMALY, ANTEPARTUM, SINGLE OR UNSPECIFIED FETUS: ICD-10-CM

## 2021-11-12 DIAGNOSIS — O35.9XX0 SUSPECTED FETAL ANOMALY, ANTEPARTUM, SINGLE OR UNSPECIFIED FETUS: Primary | ICD-10-CM

## 2021-11-12 DIAGNOSIS — O35.BXX0 FETAL CARDIAC DISEASE AFFECTING PREGNANCY, SINGLE OR UNSPECIFIED FETUS: Primary | ICD-10-CM

## 2021-11-12 LAB
Lab: NORMAL
PERFORMING LABORATORY: NORMAL
TEST NAME: NORMAL

## 2021-11-12 PROCEDURE — 76825 ECHO EXAM OF FETAL HEART: CPT | Mod: 26 | Performed by: PEDIATRICS

## 2021-11-12 PROCEDURE — 93325 DOPPLER ECHO COLOR FLOW MAPG: CPT

## 2021-11-12 PROCEDURE — 76816 OB US FOLLOW-UP PER FETUS: CPT | Mod: 26 | Performed by: OBSTETRICS & GYNECOLOGY

## 2021-11-12 PROCEDURE — 76820 UMBILICAL ARTERY ECHO: CPT | Mod: 26 | Performed by: OBSTETRICS & GYNECOLOGY

## 2021-11-12 PROCEDURE — 93325 DOPPLER ECHO COLOR FLOW MAPG: CPT | Mod: 26 | Performed by: PEDIATRICS

## 2021-11-12 PROCEDURE — 76816 OB US FOLLOW-UP PER FETUS: CPT

## 2021-11-12 PROCEDURE — 76827 ECHO EXAM OF FETAL HEART: CPT | Mod: 26 | Performed by: PEDIATRICS

## 2021-11-12 PROCEDURE — 99204 OFFICE O/P NEW MOD 45 MIN: CPT | Mod: 25 | Performed by: PEDIATRICS

## 2021-11-12 NOTE — PROGRESS NOTES
[facility]   Heart Center Fetal Consult Note    Patient:  Christine Moncada MRN:  [id number]   YOB: 1980 Age:  41 year old   Date of Visit:  2021 PCP:  Baylee Lyle MD     Dear Doctor,     I had the pleasure of seeing Christine Moncada at the [facility] on 2021 in fetal cardiology consultation for fetal echocardiogram results. She presented today accompanied by her . As you know, she is a 41 year old  at 22w1d who presented for fetal echocardiogram today because of suspected trisomy 18.    I performed and interpreted the fetal echocardiogram today, which demonstrated mildly hypoplastic aortic arch with z-score of -3. Possible perimembranous ventricular septal defect. Dilated coronary sinus, suggest the presence of a persistent left superior vena cava. Right superior vena cava not well seen. The inferior vena cava drains normally to the right atrium. Normal right and left ventricluar size and systolic function. No hydrops.     With the help of diagrams, I reviewed the echo findings today with Christine Moncada and her partner. I discussed the fetal cardiac anatomy, function, and physiology. In the setting Trisomy 18, they are leaning towards comfort care; however, with difficulty assessing the size of the aortic isthmus to provide post- course. If they would like, I would consider repeat fetal echocardiogram 29-30 weeks. She had appropriate questions which I addressed.     Plan:    1. Follow up Fetal Echo in 29-30 weeks.    Thank you for allowing me to participate in Christine Arevalo's care. Please do not hesitate to contact me with questions or concerns.    This visit was separate from the performance and interpretation of the ultrasound. The majority of the time (>50%) was spent in counseling and coordination of care. I spent approximately 45 minutes in face-to-face time reviewing the above considerations.    Tabatha  Fox KUMAR.  Pediatric Cardiology  [facility]  [address], 5th floor, [facility] [address]  Phone [phone]  Fax [phone]

## 2021-11-16 NOTE — PROGRESS NOTES
"Please see \"Imaging\" tab under \"Chart Review\" for details of today's US.    Elaina Gustafson, DO    "

## 2021-11-18 DIAGNOSIS — O35.9XX0 KNOWN FETAL ANOMALY, ANTEPARTUM, SINGLE OR UNSPECIFIED FETUS: Primary | ICD-10-CM

## 2021-11-26 ENCOUNTER — VIRTUAL VISIT (OUTPATIENT)
Dept: MATERNAL FETAL MEDICINE | Facility: CLINIC | Age: 41
End: 2021-11-26
Attending: OBSTETRICS & GYNECOLOGY
Payer: COMMERCIAL

## 2021-11-26 DIAGNOSIS — O35.9XX0 SUSPECTED FETAL ANOMALY, ANTEPARTUM, SINGLE OR UNSPECIFIED FETUS: ICD-10-CM

## 2021-11-26 PROCEDURE — 96040 HC GENETIC COUNSELING, EACH 30 MINUTES: CPT | Mod: TEL,95 | Performed by: GENETIC COUNSELOR, MS

## 2021-11-26 NOTE — PROGRESS NOTES
[facility]  Genetic Counseling Consult    Patient:  Christine Moncada YOB: 1980   Date of Service:  11/26/21      Christine Moncada was evaluated via a billable telephone visit at [facility] for genetic consultation given the presumed diagnosis of trisomy 18 in her current pregnancy.     The patient has been notified of the following:  This telephone visit will be conducted via a call between you and your physician/provider. We have found that certain health care needs can be provided without the need for a physical exam. This service lets us provide the care you need with a short phone conversation. If a prescription is necessary we can send it directly to your pharmacy. If lab work is needed we can place an order for that and you can then stop by our lab to have the test done at a later time.     If during the course of the call the provider feels a telephone visit is not appropriate, you will not be charged for this service.        Impression/Plan:   1. Christine Arevalo's pregnancy has been complicated by the findings of a strawberry-shaped calvarium, bilateral choroid plexus cysts, wide appearing cavum septum pellucidum, and bilateral clenched hands. The heart is shifted to the right hemithorax with a possible perimembranous ventricular septal defect and persistent left superior vena cava. Mild polyhydramnios has also been noted. Following Christine's first ultrasound with M, her OB provider luis a cell-free DNA ([place] through [employer]). Results returned high-risk for trisomy 18 with a positive predictive value of 99%.     2. Christine Arevalo has declined invasive diagnostic testing during the pregnancy. We discussed the benefits of confirming the diagnosis through a karyotype performed on cord blood, peripheral blood, or placental tissue to determine if the diagnosis is due to non-disjunction or a chromosomal rearrangement. If  "Christine Arevalo ultimately delivers in [place], I will help coordinate the appropriate orders. If Christine Arevalo delivers in [place] as is preferred by the family, her delivering provider should order: High Resolution Blood Chromosomes. FISH should not be ordered as it will not provide information about chromosome structure. Chromosomal microarray should not be ordered as it will not provide information about chromosome structure.     Pregnancy History:   /Parity:    Age at Delivery: 42 year old  ZACH: 3/17/2022, by Last Menstrual Period  Gestational Age: 24w1d    No significant complications or exposures were reported in the current pregnancy.    Christine burger pregnancy history is significant for:  o 2009 full-term delivery, male, alive and well  o  SAB at 9w0d  o 2016 full-term delivery, male, alive and well  o 2018 full-term delivery, male, alive and well  o 2019 SAB at 9w4d    Medical History:   Christine burger reported medical history is not expected to impact pregnancy management or risks to fetal development.       Family History:   A three-generation pedigree was obtained, and is scanned under the  Media  tab.   The following significant findings were reported by Christine Arevalo:    Christine Arevalo has one brother who does not have children.  Christine Arevalo's mother has had a diagnosis of skin cancer. One of Christine Arevalo's maternal uncles had \"blastocytoma\" (likely \"astrocytoma\") and one of her maternal aunts had ovarian cancer in her 40s. Christine Arevalo's maternal grandmother had breast cancer in her 50s. We discussed how most cancer seen in families occurs sporadically, but about 5-10% may be due to an underlying genetic etiology. We discussed that this clustering of cancers can be associated with inherited cancer predisposition syndromes. Genetic counseling is available for cancer syndromes. Cancer family history, even without genetic testing, can change cancer screening recommendations for family members and aid in " "insurance coverage for access to them as well. The most informative individuals to complete cancer genetic counseling and genetic testing are those with a personal history of cancer or those closely related to the affected individuals. If the family wants more information they can contact the [facility] Cancer Risk Management Program ([phone]). Physicians can also make referrals at [email or web address] or, if within the [facility] system, through [facility] referral for \"Cancer Risk Mgmt/Cancer Genetic Counseling\".    Christine Arevalo has one maternal first-cousin who had a baby with trisomy 18. Christine Arevalo shared that her cousin's baby had very similar findings to those detected in Christine Arevalo's pregnancy. Her cousin also has an unaffected son and no history of miscarriages. It is not known if this baby's diagnosis was due to nondisjunction or a rearrangement.       Christine Arevalo's partner, Reyes, is 40. He has ulcerative colitis.     Reyes has a sister who has a daughter conceived through IVF. Reyes's sister had a history of multiple miscarriage (Christine Arevalo was not sure of the exact number) prior to conceiving with her daughter.     Reyes's father had a heart attack in his 40s. Reyes also has a paternal uncle who had a heart attack in his 50s. Reyes may benefit from early cardiovascular screening.    Otherwise, the reported family history is negative for multiple miscarriages, stillbirths, birth defects, intellectual disability, known genetic conditions, and consanguinity.       Risk Assessment and Discussion:   We explained that the risk for fetal chromosome abnormalities increases with maternal age. We discussed specific features of common chromosome abnormalities, including Down syndrome, trisomy 13, trisomy 18, and sex chromosome conditions      - At age 42 at midtrimester, the risk to have a baby with Down syndrome is 1 in 74.     - At age 42 at midtrimester, the risk to have a " baby with any chromosome abnormality is 1 in 40.     Given the prenatal findings and the positive NIPT for trisomy 18, there is a very high suspicion that this pregnancy has trisomy 18. Christine Arevalo and Reyes have been reading about trisomy 18 and were already familiar with the condition given the family history. We focused our conversation of the possibility of recurrence and why confirmatory testing can be useful.    Trisomy 18 is rare with a prevalence of 1 in 5500. Studies on trisomy 18 are limited due to the relative rarity of the condition, and ascertainment bias due to the high possibility of pregnancy loss or affected children that did have chromosomal studies performed. It is well known that the chance of nondisjunction resulting in aneuploidy increases as the age of the egg increases. Furthermore, for an individual with a previous trisomic pregnancy the chance of a subsequent pregnancy being affected with the same or different trisomy is increased (even when chromosome results are those of the sporadic nondisjunction type). For individuals under the age of 35 during their previous pregnancy with trisomy 18, the risk of the same trisomy is 7.8 times greater than the age-related risk and for a different trisomy 1.6 times greater. For individuals over the age of 35 during their previous pregnancy with trisomy 18, the risk of the same trisomy is 2.2 times greater and for a different trisomy 0.9 times greater (Tila et al., 2009) It is important to remember that data for trisomy 18 is far less comprehensive than trisomy 21 recurrence numbers, for example. Other studies suggest that a aneuploidy recurrence risk of approximately up to 1% may be appropriate following a prior pregnancy with aneuploidy. If the age-related aneuploidy risk surpasses 1.0% it should then be used and possibly adjusted due to a previous trisomy pregnancy.     We discussed that nondisjunction is by far the most common etiology for trisomy  18. Since Christine Arevalo is of advanced maternal age, nondisjunction can be suspected. However, Christine Arevalo and Reyes's history of two first trimester losses cannot be ignored. Additionally, Reyes's sister's history of multiple losses is more consistent with a potential chromosome rearrangement. We discussed that it is possible that Reyes Arevalo carries a balanced chromosome rearrangement involving chromosome 18. Chromosome 18 is not an acrocentric chromosome. If a balanced rearrangement involving another chromosome is detected, there would be four possible outcomes:    [place] with typical chromosome complement    [place] with the balanced rearrangement    [place] with a duplication of the chromosome 18 material and a deletion of the other chromosome material    [place] with a duplication of the other chromosome material and a deletion of the chromosome 18 material    Depending on which chromosome is involved and the sizes of the rearranged material, it is possible that a deletion or duplication could result in a viable pregnancy or could be an explanation for the early losses. We discussed that if a rearrangement is identified and Christine Chambers carries it, then the couple's children and other family members could also carry it.    We reviewed the options of NIPT, nuchal translucency ultrasound, chorionic villus sampling, and amniocentesis in a future pregnancy if desired. Certainly, if a rearrangement is identified, recurrence risk will be higher than if nondisjunction is identified (although this would also depend on parent of origin). Genetic counseling will remain available to the couple to review these considerations.     It was a pleasure to be involved with Christine Arevalo's care.    Phone call contact time  Call started at 10:15  Call ended at 10:46    Roselia Reynolds MS, [facility]  Licensed Genetic Counselor  [facility]  Maternal Fetal Medicine  [email or web address]  [phone]

## 2021-11-28 ENCOUNTER — HEALTH MAINTENANCE LETTER (OUTPATIENT)
Age: 41
End: 2021-11-28

## 2021-12-01 ENCOUNTER — HOSPITAL ENCOUNTER (OUTPATIENT)
Dept: ULTRASOUND IMAGING | Facility: OTHER | Age: 41
Discharge: HOME | End: 2021-12-01
Attending: OBSTETRICS & GYNECOLOGY | Admitting: OBSTETRICS & GYNECOLOGY
Payer: COMMERCIAL

## 2021-12-01 ENCOUNTER — OFFICE VISIT (OUTPATIENT)
Dept: MATERNAL FETAL MEDICINE | Facility: CLINIC | Age: 41
End: 2021-12-01
Attending: OBSTETRICS & GYNECOLOGY
Payer: COMMERCIAL

## 2021-12-01 ENCOUNTER — HOSPITAL ENCOUNTER (OUTPATIENT)
Dept: ULTRASOUND IMAGING | Facility: CLINIC | Age: 41
End: 2021-12-01
Attending: OBSTETRICS & GYNECOLOGY
Payer: COMMERCIAL

## 2021-12-01 DIAGNOSIS — O35.10X0 SUSPECTED CHROMOSOME ANOMALY OF FETUS AFFECTING MANAGEMENT OF MOTHER IN SINGLETON PREGNANCY, ANTEPARTUM: Primary | ICD-10-CM

## 2021-12-01 DIAGNOSIS — O35.9XX0 KNOWN FETAL ANOMALY, ANTEPARTUM, SINGLE OR UNSPECIFIED FETUS: ICD-10-CM

## 2021-12-01 DIAGNOSIS — O35.9XX0 KNOWN FETAL ANOMALY, ANTEPARTUM, SINGLE OR UNSPECIFIED FETUS: Primary | ICD-10-CM

## 2021-12-01 NOTE — PROGRESS NOTES
Type of service:    Telemedicine Office Visit for an ultrasound    Date of service:     Date: 12/01/21     Time service began:    10:00 AM  Time service ended:    11:00 AM    Reason:      .tel: Lack of available service in patient area    Description of basis or telemedicine appropriateness:     Consultation provided at the request of Dr. Sanchez for advice regarding the diagnosis and treatment of this patient's pregnancy with suspected T18.  The patient's condition can be safely assessed via telemedicine.    The Mode of Transmission:    Secure interactive audio and visual telecommunication system (Video Guidance)    Location of originating and distant sites:      Originating site:   [facility]    Distant site:    [place], [place]    J Carlos Maloney MD

## 2021-12-08 ENCOUNTER — PRENATAL OFFICE VISIT (OUTPATIENT)
Dept: OBGYN | Facility: OTHER | Age: 41
End: 2021-12-08
Attending: OBSTETRICS & GYNECOLOGY
Payer: COMMERCIAL

## 2021-12-08 VITALS
SYSTOLIC BLOOD PRESSURE: 112 MMHG | BODY MASS INDEX: 30.45 KG/M2 | HEART RATE: 72 BPM | WEIGHT: 183 LBS | DIASTOLIC BLOOD PRESSURE: 60 MMHG

## 2021-12-08 DIAGNOSIS — O35.9XX0 KNOWN FETAL ANOMALY, ANTEPARTUM, SINGLE OR UNSPECIFIED FETUS: Primary | ICD-10-CM

## 2021-12-08 PROCEDURE — 99207 PR OB VISIT-NO CHARGE - GICH ONLY: CPT | Performed by: OBSTETRICS & GYNECOLOGY

## 2021-12-08 ASSESSMENT — PAIN SCALES - GENERAL: PAINLEVEL: NO PAIN (0)

## 2021-12-08 NOTE — NURSING NOTE
Chief Complaint   Patient presents with     Prenatal Care     25w6d       Medication Reconciliation: completed   Sasha Gustafson LPN  12/8/2021 4:03 PM

## 2021-12-09 NOTE — PROGRESS NOTES
Return OB Visit    S: Patient is doing okay. Getting uncomfortable. The weight of the situation is starting to wear on her, getting more real. She has been having some room-spinning dizzy episodes, usually when laying flat in bed or when she first stands up. Has only happened a few times. Had this in early pregnancy as well and in prior pregnancies but usually resolved after first trimester. Has been checking her glucoses, usually 80s-90s. No VB or LOF. Some FM.    O: /60 (BP Location: Right arm, Patient Position: Sitting, Cuff Size: Adult Regular)   Pulse 72   Wt 83 kg (183 lb)   LMP 06/10/2021 (Within Days)   Breastfeeding No   BMI 30.45 kg/m    Gen: Well-appearing, NAD  See OB Flowsheet    A/P:  Christine Moncada is a 41 year old  at 25w6d by LMP c/w 9w5d US, here for return OB visit.  AMA: declined aneuploidy screening. Normal baseline HELLP labs. Recommend starting ASA 81mg- patient declines.      Suspected T18 on level 2 US: positive [place] screen for T18. Declines confirmatory testing. S/p MFM consult- Desires comfort cares only. Wants to be induced at any point if her health starts to be compromised.     Care Plan: Plans to have only , possible birth  in room at time of delivery. Is hoping for grandparents, her children to meet baby after birth if possible. After birth, plan for baby to mother's chest. If baby is breathing and has a heart beat, she will consider vitals to be done on mother's chest. She is okay with NC O2 if it makes baby more comfortable but DOES NOT want further resuscitation efforts (no chest compressions, no intubation). She is okay with NG tube for feedings if baby will not nurse or take a bottle. She is okay with PO morphine if needed for pain. She will discuss IV/umbilical line with her .      Hx of VAVD x2  Hx of retained placenta with PPH  Considering NuvaRing for contraception     PNC: Rh positive, Rubella immune. Declines GCT, plans to  do glucose testing instead and bring to next visit  Genetics: declined  Imaging: dating US at 9w5d  Immunizations: s/p COVID series. planning to get flu shot with her family  RTC 4 weeks- CBC, syphilis, Tdap next visit. continue work on care plan, will ask about monitoring in labor, birth medications, Hinduism requests    Josey Sanchez MD FACOG  OB/GYN  12/9/2021 10:17 AM

## 2021-12-15 ENCOUNTER — ALLIED HEALTH/NURSE VISIT (OUTPATIENT)
Dept: FAMILY MEDICINE | Facility: OTHER | Age: 41
End: 2021-12-15
Attending: FAMILY MEDICINE
Payer: COMMERCIAL

## 2021-12-15 DIAGNOSIS — Z23 NEED FOR INFLUENZA VACCINATION: Primary | ICD-10-CM

## 2021-12-15 PROCEDURE — 90471 IMMUNIZATION ADMIN: CPT

## 2021-12-15 PROCEDURE — 90686 IIV4 VACC NO PRSV 0.5 ML IM: CPT

## 2021-12-21 ENCOUNTER — HOSPITAL ENCOUNTER (OUTPATIENT)
Dept: ULTRASOUND IMAGING | Facility: CLINIC | Age: 41
End: 2021-12-21
Attending: OBSTETRICS & GYNECOLOGY
Payer: COMMERCIAL

## 2021-12-21 ENCOUNTER — OFFICE VISIT (OUTPATIENT)
Dept: MATERNAL FETAL MEDICINE | Facility: CLINIC | Age: 41
End: 2021-12-21
Attending: OBSTETRICS & GYNECOLOGY
Payer: COMMERCIAL

## 2021-12-21 ENCOUNTER — HOSPITAL ENCOUNTER (OUTPATIENT)
Dept: ULTRASOUND IMAGING | Facility: OTHER | Age: 41
Discharge: HOME | End: 2021-12-21
Attending: OBSTETRICS & GYNECOLOGY | Admitting: OBSTETRICS & GYNECOLOGY
Payer: COMMERCIAL

## 2021-12-21 DIAGNOSIS — O35.9XX0 KNOWN FETAL ANOMALY, ANTEPARTUM, SINGLE OR UNSPECIFIED FETUS: ICD-10-CM

## 2021-12-21 DIAGNOSIS — O35.10X0 SUSPECTED CHROMOSOME ANOMALY OF FETUS AFFECTING MANAGEMENT OF MOTHER IN SINGLETON PREGNANCY, ANTEPARTUM: Primary | ICD-10-CM

## 2021-12-21 PROCEDURE — 76805 OB US >/= 14 WKS SNGL FETUS: CPT

## 2021-12-21 NOTE — PROGRESS NOTES
Please refer to ultrasound report under 'Imaging' Studies of 'Chart Review' tabs.    Reilly Silverio M.D.

## 2021-12-22 DIAGNOSIS — O35.9XX0 KNOWN FETAL ANOMALY, ANTEPARTUM, SINGLE OR UNSPECIFIED FETUS: Primary | ICD-10-CM

## 2021-12-22 DIAGNOSIS — O09.529 SUPERVISION OF HIGH-RISK PREGNANCY OF ELDERLY MULTIGRAVIDA: ICD-10-CM

## 2021-12-22 NOTE — PROGRESS NOTES
US orders placed per result note from [facility].    Tori Ricci RN...................12/22/2021 11:23 AM

## 2021-12-31 ENCOUNTER — HOSPITAL ENCOUNTER (OUTPATIENT)
Dept: ULTRASOUND IMAGING | Facility: OTHER | Age: 41
End: 2021-12-31
Attending: OBSTETRICS & GYNECOLOGY
Payer: COMMERCIAL

## 2021-12-31 ENCOUNTER — PRENATAL OFFICE VISIT (OUTPATIENT)
Dept: OBGYN | Facility: OTHER | Age: 41
End: 2021-12-31
Attending: OBSTETRICS & GYNECOLOGY
Payer: COMMERCIAL

## 2021-12-31 VITALS
HEART RATE: 68 BPM | OXYGEN SATURATION: 98 % | DIASTOLIC BLOOD PRESSURE: 64 MMHG | BODY MASS INDEX: 30.74 KG/M2 | WEIGHT: 184.5 LBS | HEIGHT: 65 IN | SYSTOLIC BLOOD PRESSURE: 116 MMHG

## 2021-12-31 DIAGNOSIS — O35.9XX0 KNOWN FETAL ANOMALY, ANTEPARTUM, SINGLE OR UNSPECIFIED FETUS: ICD-10-CM

## 2021-12-31 DIAGNOSIS — O35.9XX0 KNOWN FETAL ANOMALY, ANTEPARTUM, SINGLE OR UNSPECIFIED FETUS: Primary | ICD-10-CM

## 2021-12-31 DIAGNOSIS — O09.529 SUPERVISION OF HIGH-RISK PREGNANCY OF ELDERLY MULTIGRAVIDA: ICD-10-CM

## 2021-12-31 LAB
ERYTHROCYTE [DISTWIDTH] IN BLOOD BY AUTOMATED COUNT: 12.7 % (ref 10–15)
HCT VFR BLD AUTO: 40.7 % (ref 35–47)
HGB BLD-MCNC: 13.4 G/DL (ref 11.7–15.7)
MCH RBC QN AUTO: 29.8 PG (ref 26.5–33)
MCHC RBC AUTO-ENTMCNC: 32.9 G/DL (ref 31.5–36.5)
MCV RBC AUTO: 90 FL (ref 78–100)
PLATELET # BLD AUTO: 284 10E3/UL (ref 150–450)
RBC # BLD AUTO: 4.5 10E6/UL (ref 3.8–5.2)
WBC # BLD AUTO: 7.2 10E3/UL (ref 4–11)

## 2021-12-31 PROCEDURE — 86780 TREPONEMA PALLIDUM: CPT | Mod: ZL | Performed by: OBSTETRICS & GYNECOLOGY

## 2021-12-31 PROCEDURE — 76815 OB US LIMITED FETUS(S): CPT

## 2021-12-31 PROCEDURE — 36415 COLL VENOUS BLD VENIPUNCTURE: CPT | Mod: ZL | Performed by: OBSTETRICS & GYNECOLOGY

## 2021-12-31 PROCEDURE — 85027 COMPLETE CBC AUTOMATED: CPT | Mod: ZL | Performed by: OBSTETRICS & GYNECOLOGY

## 2021-12-31 PROCEDURE — 99207 PR OB VISIT-NO CHARGE - GICH ONLY: CPT | Performed by: OBSTETRICS & GYNECOLOGY

## 2021-12-31 ASSESSMENT — MIFFLIN-ST. JEOR: SCORE: 1512.77

## 2021-12-31 NOTE — PROGRESS NOTES
"Return OB Visit    S: Patient is feeling well. A little discouraged that her JENNY keeps increasing. Active baby.    O: /64   Pulse 68   Ht 1.651 m (5' 5\")   Wt 83.7 kg (184 lb 8 oz)   LMP 06/10/2021 (Within Days)   SpO2 98%   BMI 30.70 kg/m    Gen: Well-appearing, NAD  See OB Flowsheet    A/P:  Christine Moncada is a 41 year old  at 29w1d by LMP c/w 9w5d US, here for return OB visit.  AMA: declined aneuploidy screening. Normal baseline HELLP labs. Recommend starting ASA 81mg- patient declines.      Suspected T18 on level 2 US: positive [place] screen for T18. Declines confirmatory testing. S/p MFM consult- Desires comfort cares only. Wants to be induced at any point if her health starts to be compromised.   Moderate polyhydramnios: weekly JENNY checks     Care Plan: Plans to have only , possible birth  in room at time of delivery. Will do monitoring in labor only upon request.  Is hoping for grandparents, her children to meet baby after birth if possible. After birth, plan for baby to mother's chest. If baby is breathing and has a heart beat, she will consider vitals to be done on mother's chest. She is okay with NC O2 if it makes baby more comfortable but DOES NOT want further resuscitation efforts (no chest compressions, no intubation). She is okay with NG tube for feedings if baby will not nurse or take a bottle. She is okay with PO morphine if needed for pain. She will plan to do baby meds if baby survives the first few hours. She will discuss IV/umbilical line with her . she has no Moravian requests for after birth.     Hx of VAVD x2  Hx of retained placenta with PPH  Considering NuvaRing for contraception     PNC: Rh positive, Rubella immune. Declines GCT, plans to do glucose testing instead and bring to next visit  Genetics: declined  Imaging: dating US at 9w5d  Immunizations: s/p COVID series. declines Tdap.  RTC 2 weeks with MD, weekly JENNY checks     Josey" MD Daniel FACOG  OB/GYN  12/31/2021 9:41 AM

## 2021-12-31 NOTE — NURSING NOTE
Patient presents today for prenatal care. Patient is 29w1d.    Medication Reconciliation Complete    Yvrose Bautista LPN  12/31/2021 8:11 AM

## 2022-01-05 ENCOUNTER — HOSPITAL ENCOUNTER (OUTPATIENT)
Dept: ULTRASOUND IMAGING | Facility: OTHER | Age: 42
Discharge: HOME | End: 2022-01-05
Attending: OBSTETRICS & GYNECOLOGY | Admitting: OBSTETRICS & GYNECOLOGY
Payer: COMMERCIAL

## 2022-01-05 DIAGNOSIS — O09.529 SUPERVISION OF HIGH-RISK PREGNANCY OF ELDERLY MULTIGRAVIDA: ICD-10-CM

## 2022-01-05 DIAGNOSIS — O35.9XX0 KNOWN FETAL ANOMALY, ANTEPARTUM, SINGLE OR UNSPECIFIED FETUS: ICD-10-CM

## 2022-01-05 PROCEDURE — 76815 OB US LIMITED FETUS(S): CPT

## 2022-01-11 ENCOUNTER — PRENATAL OFFICE VISIT (OUTPATIENT)
Dept: OBGYN | Facility: OTHER | Age: 42
End: 2022-01-11
Attending: OBSTETRICS & GYNECOLOGY
Payer: COMMERCIAL

## 2022-01-11 ENCOUNTER — HOSPITAL ENCOUNTER (OUTPATIENT)
Dept: ULTRASOUND IMAGING | Facility: OTHER | Age: 42
End: 2022-01-11
Attending: OBSTETRICS & GYNECOLOGY
Payer: COMMERCIAL

## 2022-01-11 VITALS
DIASTOLIC BLOOD PRESSURE: 60 MMHG | WEIGHT: 185 LBS | SYSTOLIC BLOOD PRESSURE: 110 MMHG | BODY MASS INDEX: 30.79 KG/M2 | HEART RATE: 76 BPM

## 2022-01-11 DIAGNOSIS — O09.529 SUPERVISION OF HIGH-RISK PREGNANCY OF ELDERLY MULTIGRAVIDA: ICD-10-CM

## 2022-01-11 DIAGNOSIS — O35.9XX0 KNOWN FETAL ANOMALY, ANTEPARTUM, SINGLE OR UNSPECIFIED FETUS: ICD-10-CM

## 2022-01-11 DIAGNOSIS — O35.9XX0 KNOWN FETAL ANOMALY, ANTEPARTUM, SINGLE OR UNSPECIFIED FETUS: Primary | ICD-10-CM

## 2022-01-11 PROCEDURE — 99207 PR OB VISIT-NO CHARGE - GICH ONLY: CPT | Performed by: OBSTETRICS & GYNECOLOGY

## 2022-01-11 PROCEDURE — 76815 OB US LIMITED FETUS(S): CPT

## 2022-01-11 ASSESSMENT — PAIN SCALES - GENERAL: PAINLEVEL: NO PAIN (0)

## 2022-01-11 NOTE — PROGRESS NOTES
Return OB Visit    S: Patient is feeling well. BH ctx, no VB or LOF. +FM    O: /60 (BP Location: Right arm, Patient Position: Sitting, Cuff Size: Adult Regular)   Pulse 76   Wt 83.9 kg (185 lb)   LMP 06/10/2021 (Within Days)   Breastfeeding No   BMI 30.79 kg/m    Gen: Well-appearing, NAD  See OB Flowsheet    A/P:  Christine Moncada is a 41 year old  at 30w5d by LMP c/w 9w5d US, here for return OB visit.  AMA: declined aneuploidy screening. Normal baseline HELLP labs. Recommend starting ASA 81mg- patient declines.      Suspected T18 on level 2 US: positive [place] screen for T18. Declines confirmatory testing. S/p MFM consult- Desires comfort cares only. Wants to be induced at any point if her health starts to be compromised.   Moderate polyhydramnios: weekly JENNY checks     Care Plan: Plans to have only , possible birth  in room at time of delivery. Will do monitoring in labor only upon request.  Is hoping for grandparents, her children to meet baby after birth if possible. After birth, plan for baby to mother's chest. If baby is breathing and has a heart beat, she will consider vitals to be done on mother's chest. She is okay with NC O2 if it makes baby more comfortable but DOES NOT want further resuscitation efforts (no chest compressions, no intubation). She is okay with NG tube for feedings if baby will not nurse or take a bottle. She is okay with PO morphine if needed for pain. She will plan to do baby meds if baby survives the first few hours. She will discuss IV/umbilical line with her . she has no Islam requests for after birth. Is interested in breech vaginal delivery if needed, prefers over  section.     Hx of VAVD x2  Hx of retained placenta with PPH  Considering NuvaRing for contraception     PNC: Rh positive, Rubella immune. Declines GCT, plans to do glucose testing instead and bring to next visit  Genetics: declined  Imaging: dating US at  9w5d  Immunizations: s/p COVID series. declines Tdap.  RTC 2 weeks with MD, weekly JENNY checks     Josey Sanchez MD FACOG  OB/GYN  1/11/2022 4:24 PM

## 2022-01-11 NOTE — NURSING NOTE
Chief Complaint   Patient presents with     Prenatal Care     30w5d       Medication Reconciliation: complete    Sasha Gustafson LPN........................1/11/2022  4:00 PM

## 2022-01-18 ENCOUNTER — HOSPITAL ENCOUNTER (OUTPATIENT)
Dept: ULTRASOUND IMAGING | Facility: OTHER | Age: 42
Discharge: HOME | End: 2022-01-18
Attending: OBSTETRICS & GYNECOLOGY | Admitting: OBSTETRICS & GYNECOLOGY
Payer: COMMERCIAL

## 2022-01-18 DIAGNOSIS — O09.529 SUPERVISION OF HIGH-RISK PREGNANCY OF ELDERLY MULTIGRAVIDA: ICD-10-CM

## 2022-01-18 DIAGNOSIS — O35.9XX0 KNOWN FETAL ANOMALY, ANTEPARTUM, SINGLE OR UNSPECIFIED FETUS: ICD-10-CM

## 2022-01-18 PROCEDURE — 76815 OB US LIMITED FETUS(S): CPT

## 2022-01-26 ENCOUNTER — HOSPITAL ENCOUNTER (OUTPATIENT)
Dept: ULTRASOUND IMAGING | Facility: CLINIC | Age: 42
End: 2022-01-26
Attending: OBSTETRICS & GYNECOLOGY
Payer: COMMERCIAL

## 2022-01-26 ENCOUNTER — OFFICE VISIT (OUTPATIENT)
Dept: MATERNAL FETAL MEDICINE | Facility: CLINIC | Age: 42
End: 2022-01-26
Attending: OBSTETRICS & GYNECOLOGY
Payer: COMMERCIAL

## 2022-01-26 ENCOUNTER — PRENATAL OFFICE VISIT (OUTPATIENT)
Dept: OBGYN | Facility: OTHER | Age: 42
End: 2022-01-26
Attending: OBSTETRICS & GYNECOLOGY
Payer: COMMERCIAL

## 2022-01-26 ENCOUNTER — HOSPITAL ENCOUNTER (OUTPATIENT)
Dept: ULTRASOUND IMAGING | Facility: OTHER | Age: 42
End: 2022-01-26
Attending: OBSTETRICS & GYNECOLOGY
Payer: COMMERCIAL

## 2022-01-26 VITALS
WEIGHT: 183 LBS | SYSTOLIC BLOOD PRESSURE: 110 MMHG | HEART RATE: 68 BPM | DIASTOLIC BLOOD PRESSURE: 60 MMHG | BODY MASS INDEX: 30.45 KG/M2

## 2022-01-26 DIAGNOSIS — O09.529 SUPERVISION OF HIGH-RISK PREGNANCY OF ELDERLY MULTIGRAVIDA: ICD-10-CM

## 2022-01-26 DIAGNOSIS — O35.9XX0 KNOWN FETAL ANOMALY, ANTEPARTUM, SINGLE OR UNSPECIFIED FETUS: ICD-10-CM

## 2022-01-26 DIAGNOSIS — O35.9XX0 KNOWN FETAL ANOMALY, ANTEPARTUM, SINGLE OR UNSPECIFIED FETUS: Primary | ICD-10-CM

## 2022-01-26 DIAGNOSIS — O35.12X0 FETAL TRISOMY 18 AFFECTING CARE OF MOTHER, ANTEPARTUM, SINGLE OR UNSPECIFIED FETUS: Primary | ICD-10-CM

## 2022-01-26 PROCEDURE — 99207 PR OB VISIT-NO CHARGE - GICH ONLY: CPT | Performed by: OBSTETRICS & GYNECOLOGY

## 2022-01-26 ASSESSMENT — PAIN SCALES - GENERAL: PAINLEVEL: NO PAIN (0)

## 2022-01-26 NOTE — NURSING NOTE
Chief Complaint   Patient presents with     Prenatal Care     32w6d       Medication Reconciliation: complete    Sasha Gustafson LPN........................1/26/2022  10:02 AM

## 2022-01-26 NOTE — PROGRESS NOTES
Return OB Visit    S: Patient is feeling well. Can tell the fluid has decreased, is more comfortable. Can also tell when baby is doing somersaults, feels less movement when baby is breech. +BH ctx. No VB or LOF.    O: /60 (BP Location: Right arm, Patient Position: Sitting, Cuff Size: Adult Large)   Pulse 68   Wt 83 kg (183 lb)   LMP 06/10/2021 (Within Days)   Breastfeeding No   BMI 30.45 kg/m    Gen: Well-appearing, NAD  See OB Flowsheet    A/P:  Christine Moncada is a 42 year old  at 32w6d by LMP c/w 9w5d US, here for return OB visit.  AMA: declined aneuploidy screening. Normal baseline HELLP labs. Recommend starting ASA 81mg- patient declines.      Suspected T18 on level 2 US: positive [place] screen for T18. Declines confirmatory testing. S/p MFM consult- Desires comfort cares only. Wants to be induced at any point if her health starts to be compromised. Growth US 2022 EFW 1956g (2%ile)     Moderate polyhydramnios: weekly JENNY checks     Care Plan: Plans to have only , possible birth  in room at time of delivery. Will do monitoring in labor only upon request.  Is hoping for grandparents, her children to meet baby after birth if possible. After birth, plan for baby to mother's chest. If baby is breathing and has a heart beat, she will consider vitals to be done on mother's chest. She is okay with NC O2 if it makes baby more comfortable but DOES NOT want further resuscitation efforts (no chest compressions, no intubation). She is okay with NG tube for feedings if baby will not nurse or take a bottle. She is okay with PO morphine if needed for pain. She will plan to do baby meds if baby survives the first few hours. She will discuss IV/umbilical line with her . she has no Anabaptist requests for after birth. Is interested in breech vaginal delivery if needed, prefers over  section.     Hx of VAVD x2  Hx of retained placenta with PPH  Considering NuvaRing for  contraception     PNC: Rh positive, Rubella immune. Declines GCT, plans to do glucose testing instead and bring to next visit  Genetics: declined  Imaging: dating US at 9w5d  Immunizations: s/p COVID series. declines Tdap.  RTC 2 weeks with MD, weekly JENNY checks     Josey Sanchez MD FACOG  OB/GYN  1/26/2022 9:52 AM

## 2022-01-26 NOTE — PROGRESS NOTES
Type of service:    Telemedicine Diagnostic Assessment for pregnancy complicated by T18    Date of service:     Date: 01/26/22     Time service began:    9:00 AM  Time service ended:    10:00 AM    Reason:      .tel: Lack of available service in patient area    Description of basis or telemedicine appropriateness:     Consultation provided at the request of Dr. James for advice regarding the diagnosis and treatment of this patient's pregnancy complicated by AMA.  The patient's condition can be safely assessed via telemedicine.    The Mode of Transmission:    Secure interactive audio and visual telecommunication system (Video Guidance)    Location of originating and distant sites:      Originating site:   [place], [place]    Distant site:    [place], [place]    Yvrose Maldonado MD

## 2022-02-01 ENCOUNTER — HOSPITAL ENCOUNTER (OUTPATIENT)
Dept: ULTRASOUND IMAGING | Facility: OTHER | Age: 42
Discharge: HOME | End: 2022-02-01
Attending: OBSTETRICS & GYNECOLOGY | Admitting: OBSTETRICS & GYNECOLOGY
Payer: COMMERCIAL

## 2022-02-01 DIAGNOSIS — O09.529 SUPERVISION OF HIGH-RISK PREGNANCY OF ELDERLY MULTIGRAVIDA: ICD-10-CM

## 2022-02-01 DIAGNOSIS — O35.9XX0 KNOWN FETAL ANOMALY, ANTEPARTUM, SINGLE OR UNSPECIFIED FETUS: ICD-10-CM

## 2022-02-01 PROCEDURE — 76815 OB US LIMITED FETUS(S): CPT

## 2022-02-08 ENCOUNTER — PRENATAL OFFICE VISIT (OUTPATIENT)
Dept: OBGYN | Facility: OTHER | Age: 42
End: 2022-02-08
Attending: OBSTETRICS & GYNECOLOGY
Payer: COMMERCIAL

## 2022-02-08 ENCOUNTER — HOSPITAL ENCOUNTER (OUTPATIENT)
Dept: ULTRASOUND IMAGING | Facility: OTHER | Age: 42
End: 2022-02-08
Attending: OBSTETRICS & GYNECOLOGY
Payer: COMMERCIAL

## 2022-02-08 VITALS
WEIGHT: 183 LBS | HEART RATE: 68 BPM | SYSTOLIC BLOOD PRESSURE: 112 MMHG | BODY MASS INDEX: 30.45 KG/M2 | DIASTOLIC BLOOD PRESSURE: 68 MMHG

## 2022-02-08 DIAGNOSIS — O35.9XX0 KNOWN FETAL ANOMALY, ANTEPARTUM, SINGLE OR UNSPECIFIED FETUS: ICD-10-CM

## 2022-02-08 DIAGNOSIS — O09.529 SUPERVISION OF HIGH-RISK PREGNANCY OF ELDERLY MULTIGRAVIDA: ICD-10-CM

## 2022-02-08 DIAGNOSIS — O35.9XX0 KNOWN FETAL ANOMALY, ANTEPARTUM, SINGLE OR UNSPECIFIED FETUS: Primary | ICD-10-CM

## 2022-02-08 PROCEDURE — 99207 PR OB VISIT-NO CHARGE - GICH ONLY: CPT | Performed by: OBSTETRICS & GYNECOLOGY

## 2022-02-08 PROCEDURE — 76815 OB US LIMITED FETUS(S): CPT

## 2022-02-08 ASSESSMENT — PAIN SCALES - GENERAL: PAINLEVEL: NO PAIN (0)

## 2022-02-08 NOTE — NURSING NOTE
Chief Complaint   Patient presents with     Prenatal Care     34w5d       Medication Reconciliation: complete    Sasha Gustafson LPN........................2/8/2022  4:04 PM

## 2022-02-08 NOTE — PROGRESS NOTES
Return OB Visit    S: Patient is feeling well. Some BH ctx, no VB or LOF. +FM    O: /68 (BP Location: Right arm, Patient Position: Sitting, Cuff Size: Adult Regular)   Pulse 68   Wt 83 kg (183 lb)   LMP 06/10/2021 (Within Days)   Breastfeeding No   BMI 30.45 kg/m    Gen: Well-appearing, NAD  See OB Flowsheet    A/P:  Christine Moncada is a 42 year old  at 34w5d by LMP c/w 9w5d US, here for return OB visit.  AMA: declined aneuploidy screening. Normal baseline HELLP labs. Recommend starting ASA 81mg- patient declines.      Suspected T18 on level 2 US: positive [place] screen for T18. Declines confirmatory testing. S/p MFM consult- Desires comfort cares only. Wants to be induced at any point if her health starts to be compromised. Growth US 2022 EFW 1956g (2%ile)     Moderate polyhydramnios: weekly JENNY checks     Care Plan: Plans to have only , possible birth  in room at time of delivery. Will do monitoring in labor only upon request.  Is hoping for grandparents, her children to meet baby after birth if possible. After birth, plan for baby to mother's chest. If baby is breathing and has a heart beat, she will consider vitals to be done on mother's chest. She is okay with NC O2 if it makes baby more comfortable but DOES NOT want further resuscitation efforts (no chest compressions, no intubation). She is okay with NG tube for feedings if baby will not nurse or take a bottle. She is okay with PO morphine if needed for pain. She will plan to do baby meds if baby survives the first few hours. She will discuss IV/umbilical line with her . she has no Episcopal requests for after birth. Is interested in breech vaginal delivery if needed, prefers over  section. Cord blood needs to be collected at time of birth for confirmation of genetics.     Hx of VAVD x2  Hx of retained placenta with PPH  Considering NuvaRing for contraception     PNC: Rh positive, Rubella immune.  Declines GCT  Genetics: declined  Imaging: dating US at 9w5d  Immunizations: s/p COVID series. declines Tdap.  RTC 2 weeks with MD, weekly JENNY checks     Josey Sanchez MD FACOG  OB/GYN  2/8/2022 4:54 PM

## 2022-02-17 ENCOUNTER — HOSPITAL ENCOUNTER (OUTPATIENT)
Dept: ULTRASOUND IMAGING | Facility: OTHER | Age: 42
Discharge: HOME | End: 2022-02-17
Attending: OBSTETRICS & GYNECOLOGY | Admitting: OBSTETRICS & GYNECOLOGY
Payer: COMMERCIAL

## 2022-02-17 DIAGNOSIS — O09.529 SUPERVISION OF HIGH-RISK PREGNANCY OF ELDERLY MULTIGRAVIDA: ICD-10-CM

## 2022-02-17 DIAGNOSIS — O35.9XX0 KNOWN FETAL ANOMALY, ANTEPARTUM, SINGLE OR UNSPECIFIED FETUS: ICD-10-CM

## 2022-02-17 PROCEDURE — 76815 OB US LIMITED FETUS(S): CPT

## 2022-02-22 ENCOUNTER — PRENATAL OFFICE VISIT (OUTPATIENT)
Dept: OBGYN | Facility: OTHER | Age: 42
End: 2022-02-22
Attending: OBSTETRICS & GYNECOLOGY
Payer: COMMERCIAL

## 2022-02-22 VITALS
SYSTOLIC BLOOD PRESSURE: 118 MMHG | DIASTOLIC BLOOD PRESSURE: 68 MMHG | HEART RATE: 80 BPM | WEIGHT: 186 LBS | BODY MASS INDEX: 30.95 KG/M2

## 2022-02-22 DIAGNOSIS — O35.9XX0 KNOWN FETAL ANOMALY, ANTEPARTUM, SINGLE OR UNSPECIFIED FETUS: Primary | ICD-10-CM

## 2022-02-22 PROCEDURE — 87653 STREP B DNA AMP PROBE: CPT | Mod: ZL | Performed by: OBSTETRICS & GYNECOLOGY

## 2022-02-22 PROCEDURE — 99207 PR OB VISIT-NO CHARGE - GICH ONLY: CPT | Performed by: OBSTETRICS & GYNECOLOGY

## 2022-02-22 ASSESSMENT — PAIN SCALES - GENERAL: PAINLEVEL: NO PAIN (0)

## 2022-02-22 NOTE — NURSING NOTE
Chief Complaint   Patient presents with     Prenatal Care     36w5d       Medication Reconciliation: complete    Sasha Gustafson LPN........................2/22/2022  4:14 PM

## 2022-02-22 NOTE — PROGRESS NOTES
Return OB Visit    S: Patient is feeling well. Some BH ctx, improve with fluids. No VB or LOF. +FM    O: /68 (BP Location: Right arm, Patient Position: Sitting, Cuff Size: Adult Regular)   Pulse 80   Wt 84.4 kg (186 lb)   LMP 06/10/2021 (Within Days)   Breastfeeding No   BMI 30.95 kg/m    Gen: Well-appearing, NAD  See OB Flowsheet    A/P:  Christine Moncada is a 42 year old  at 36w5d by LMP c/w 9w5d US, here for return OB visit.  AMA: declined aneuploidy screening. Normal baseline HELLP labs. Recommend starting ASA 81mg- patient declines.      Suspected T18 on level 2 US: positive [place] screen for T18. Declines confirmatory testing. S/p MFM consult- Desires comfort cares only. Wants to be induced at any point if her health starts to be compromised. Growth US 2022 EFW 1956g (2%ile)     Moderate polyhydramnios: now resolved, weekly JENNY checks     Care Plan: Plans to have only , possible birth  in room at time of delivery. Will do monitoring in labor only upon request.  Is hoping for grandparents, her children to meet baby after birth if possible. After birth, plan for baby to mother's chest. If baby is breathing and has a heart beat, she will consider vitals to be done on mother's chest. She is okay with NC O2 if it makes baby more comfortable but DOES NOT want further resuscitation efforts (no chest compressions, no intubation). She is okay with NG tube for feedings if baby will not nurse or take a bottle. She is okay with PO morphine if needed for pain. She will plan to do baby meds if baby survives the first few hours. She will discuss IV/umbilical line with her . she has no Yazidi requests for after birth. Is interested in breech vaginal delivery if needed, prefers over  section. Cord blood needs to be collected at time of birth for confirmation of genetics. Has approval for visitors, 2 at a time, for after birth to meet baby.     Hx of VAVD x2  Hx of  retained placenta with PPH  Considering NuvaRing for contraception     PNC: Rh positive, Rubella immune. Declines GCT  Genetics: declined  Imaging: dating US at 9w5d  Immunizations: s/p COVID series. declines Tdap.  RTC weekly with JENNY checks    Josey Sanchez MD FACOG  OB/GYN  2/22/2022 4:22 PM

## 2022-02-23 ENCOUNTER — HOSPITAL ENCOUNTER (OUTPATIENT)
Dept: ULTRASOUND IMAGING | Facility: CLINIC | Age: 42
End: 2022-02-23
Attending: OBSTETRICS & GYNECOLOGY
Payer: COMMERCIAL

## 2022-02-23 ENCOUNTER — HOSPITAL ENCOUNTER (OUTPATIENT)
Dept: ULTRASOUND IMAGING | Facility: OTHER | Age: 42
Discharge: HOME | End: 2022-02-23
Attending: OBSTETRICS & GYNECOLOGY | Admitting: OBSTETRICS & GYNECOLOGY
Payer: COMMERCIAL

## 2022-02-23 ENCOUNTER — OFFICE VISIT (OUTPATIENT)
Dept: MATERNAL FETAL MEDICINE | Facility: CLINIC | Age: 42
End: 2022-02-23
Attending: OBSTETRICS & GYNECOLOGY
Payer: COMMERCIAL

## 2022-02-23 DIAGNOSIS — O36.5930 POOR FETAL GROWTH AFFECTING MANAGEMENT OF MOTHER IN THIRD TRIMESTER, SINGLE OR UNSPECIFIED FETUS: ICD-10-CM

## 2022-02-23 DIAGNOSIS — O35.10X0 SUSPECTED CHROMOSOME ANOMALY OF FETUS AFFECTING MANAGEMENT OF MOTHER IN SINGLETON PREGNANCY, ANTEPARTUM: Primary | ICD-10-CM

## 2022-02-23 DIAGNOSIS — O35.9XX0 KNOWN FETAL ANOMALY, ANTEPARTUM, SINGLE OR UNSPECIFIED FETUS: ICD-10-CM

## 2022-02-23 PROCEDURE — 76816 OB US FOLLOW-UP PER FETUS: CPT

## 2022-02-23 NOTE — PROGRESS NOTES
Type of service:    Telemedicine Office Visit for Fetal Ultrasound    Date of service:     Date: 02/23/22     Time service began:    8:00 AM  Time service ended:    9:00 AM    Reason:      .tel: Lack of available service in patient area    Description of basis or telemedicine appropriateness:     Consultation provided at the request of Dr. Sanchez for advice regarding the diagnosis and treatment of this patient's fetal ultrasound.  The patient's condition can be safely assessed via telemedicine.    The Mode of Transmission:    Secure interactive audio and visual telecommunication system (Video Guidance)    Location of originating and distant sites:      Originating site:   [place], [place]    Distant site:    [place], [place]    Stacy Singer MD

## 2022-02-24 LAB
GP B STREP DNA SPEC QL NAA+PROBE: NEGATIVE
PATIENT PENICILLIN, AMOXICILLIN, CEPHALOSPORINS ALLERGY: YES

## 2022-02-25 ENCOUNTER — PRENATAL OFFICE VISIT (OUTPATIENT)
Dept: OBGYN | Facility: OTHER | Age: 42
End: 2022-02-25
Attending: OBSTETRICS & GYNECOLOGY
Payer: COMMERCIAL

## 2022-02-25 VITALS
DIASTOLIC BLOOD PRESSURE: 76 MMHG | BODY MASS INDEX: 31.28 KG/M2 | HEART RATE: 76 BPM | SYSTOLIC BLOOD PRESSURE: 118 MMHG | WEIGHT: 188 LBS

## 2022-02-25 DIAGNOSIS — O35.9XX0 KNOWN FETAL ANOMALY, ANTEPARTUM, SINGLE OR UNSPECIFIED FETUS: Primary | ICD-10-CM

## 2022-02-25 PROCEDURE — 99207 PR OB VISIT-NO CHARGE - GICH ONLY: CPT | Performed by: OBSTETRICS & GYNECOLOGY

## 2022-02-25 ASSESSMENT — PAIN SCALES - GENERAL: PAINLEVEL: NO PAIN (0)

## 2022-02-25 NOTE — NURSING NOTE
Chief Complaint   Patient presents with     Prenatal Care     37w1d       Medication Reconciliation: complete   Offers no complaints    Sasha Gustafson LPN........................2/25/2022  3:37 PM

## 2022-02-25 NOTE — PROGRESS NOTES
Return OB Visit    S: Patient is coming in today due to confusion regarding the MFM recommendations for delivery. She still prefers to hold off for natural labor as long as possible but is willing to be induced if it is indicated. Still feeling baby move.     O: /76 (BP Location: Right arm, Patient Position: Sitting, Cuff Size: Adult Large)   Pulse 76   Wt 85.3 kg (188 lb)   LMP 06/10/2021 (Within Days)   Breastfeeding No   BMI 31.28 kg/m    Gen: Well-appearing, NAD  See OB Flowsheet    A/P:  Christine Moncada is a 42 year old  at 37w1d by LMP c/w 9w5d US, here for return OB visit.  AMA: declined aneuploidy screening. Normal baseline HELLP labs. Recommend starting ASA 81mg- patient declines.      Suspected T18 on level 2 US: positive [place] screen for T18. Declines confirmatory testing. S/p MFM consult- Desires comfort cares only. Wants to be induced at any point if her health starts to be compromised. Growth US 2022 EFW 1956g (2%ile)     Moderate polyhydramnios: now resolved, weekly JENNY checks     Care Plan: Plans to have only , possible birth  in room at time of delivery. Will do monitoring in labor only upon request.  Is hoping for grandparents, her children to meet baby after birth if possible. After birth, plan for baby to mother's chest. If baby is breathing and has a heart beat, she will consider vitals to be done on mother's chest. She is okay with NC O2 if it makes baby more comfortable but DOES NOT want further resuscitation efforts (no chest compressions, no intubation). She is okay with NG tube for feedings if baby will not nurse or take a bottle. She is okay with PO morphine if needed for pain. She will plan to do baby meds if baby survives the first few hours. She will discuss IV/umbilical line with her . she has no Church requests for after birth. Is interested in breech vaginal delivery if needed, prefers over  section. Cord blood needs to  be collected at time of birth for confirmation of genetics. Has approval for visitors, 2 at a time, for after birth to meet baby.    Discussed MFM recommendations would pertain to a baby that was expected to do better after delivery than in utero, which is no guarantee for her baby. After lengthy discussion, patient wants to continue with expectant management.     Hx of VAVD x2  Hx of retained placenta with PPH  Considering NuvaRing for contraception     PNC: Rh positive, Rubella immune. Declines GCT  Genetics: declined  Imaging: dating US at 9w5d  Immunizations: s/p COVID series. declines Tdap.  RTC weekly with JENNY checks  Josey Sanchez MD FACOG  OB/GYN  2/25/2022 3:43 PM

## 2022-03-01 ENCOUNTER — HOSPITAL ENCOUNTER (OUTPATIENT)
Dept: ULTRASOUND IMAGING | Facility: OTHER | Age: 42
End: 2022-03-01
Attending: OBSTETRICS & GYNECOLOGY
Payer: COMMERCIAL

## 2022-03-01 ENCOUNTER — PRENATAL OFFICE VISIT (OUTPATIENT)
Dept: OBGYN | Facility: OTHER | Age: 42
End: 2022-03-01
Attending: OBSTETRICS & GYNECOLOGY
Payer: COMMERCIAL

## 2022-03-01 VITALS
DIASTOLIC BLOOD PRESSURE: 76 MMHG | BODY MASS INDEX: 31.12 KG/M2 | HEART RATE: 76 BPM | WEIGHT: 187 LBS | SYSTOLIC BLOOD PRESSURE: 122 MMHG

## 2022-03-01 DIAGNOSIS — O09.529 SUPERVISION OF HIGH-RISK PREGNANCY OF ELDERLY MULTIGRAVIDA: ICD-10-CM

## 2022-03-01 DIAGNOSIS — O35.9XX0 KNOWN FETAL ANOMALY, ANTEPARTUM, SINGLE OR UNSPECIFIED FETUS: Primary | ICD-10-CM

## 2022-03-01 DIAGNOSIS — O35.9XX0 KNOWN FETAL ANOMALY, ANTEPARTUM, SINGLE OR UNSPECIFIED FETUS: ICD-10-CM

## 2022-03-01 PROCEDURE — 76815 OB US LIMITED FETUS(S): CPT

## 2022-03-01 PROCEDURE — 99207 PR OB VISIT-NO CHARGE - GICH ONLY: CPT | Performed by: OBSTETRICS & GYNECOLOGY

## 2022-03-01 ASSESSMENT — PAIN SCALES - GENERAL: PAINLEVEL: NO PAIN (0)

## 2022-03-01 NOTE — PROGRESS NOTES
Return OB Visit    S: Patient is feeling well. Much better after our conversation last week about delivery timing. No ctx, VB or LOF. +FM    O: /76 (BP Location: Right arm, Patient Position: Sitting, Cuff Size: Adult Regular)   Pulse 76   Wt 84.8 kg (187 lb)   LMP 06/10/2021 (Within Days)   Breastfeeding No   BMI 31.12 kg/m    Gen: Well-appearing, NAD  See OB Flowsheet    A/P:  Christine Moncada is a 42 year old  at 37w5d by LMP c/w 9w5d US, here for return OB visit.  AMA: declined aneuploidy screening. Normal baseline HELLP labs. Recommend starting ASA 81mg- patient declines.      Suspected T18 on level 2 US: positive [place] screen for T18. Declines confirmatory testing. S/p MFM consult- Desires comfort cares only. Wants to be induced at any point if her health starts to be compromised. Growth US 2022 EFW 1956g (2%ile)     Moderate polyhydramnios: now resolved, weekly JENNY checks     Care Plan: Plans to have only , possible birth  in room at time of delivery. Will do monitoring in labor only upon request.  Is hoping for grandparents, her children to meet baby after birth if possible. After birth, plan for baby to mother's chest. If baby is breathing and has a heart beat, she will consider vitals to be done on mother's chest. She is okay with NC O2 if it makes baby more comfortable but DOES NOT want further resuscitation efforts (no chest compressions, no intubation). She is okay with NG tube for feedings if baby will not nurse or take a bottle. She is okay with PO morphine if needed for pain. She will plan to do baby meds if baby survives the first few hours. She will discuss IV/umbilical line with her . she has no Mormon requests for after birth. Is interested in breech vaginal delivery if needed, prefers over  section. Cord blood needs to be collected at time of birth for confirmation of genetics. Has approval for visitors, 2 at a time, for after birth  to meet baby, including her children.     Discussed MFM recommendations would pertain to a baby that was expected to do better after delivery than in utero, which is no guarantee for her baby. After lengthy discussion, patient wants to continue with expectant management.     Hx of VAVD x2  Hx of retained placenta with PPH  Considering NuvaRing for contraception     PNC: Rh positive, Rubella immune. Declines GCT  Genetics: declined  Imaging: dating US at 9w5d  Immunizations: s/p COVID series. declines Tdap.  RTC weekly with JENNY checks      Josey Sanchez MD FACOG  OB/GYN  3/1/2022 3:56 PM

## 2022-03-01 NOTE — NURSING NOTE
Chief Complaint   Patient presents with     Prenatal Care     347w5d       Medication Reconciliation: complete       Sasha Gustafson LPN........................3/1/2022  3:53 PM

## 2022-03-08 ENCOUNTER — PRENATAL OFFICE VISIT (OUTPATIENT)
Dept: OBGYN | Facility: OTHER | Age: 42
End: 2022-03-08
Attending: STUDENT IN AN ORGANIZED HEALTH CARE EDUCATION/TRAINING PROGRAM
Payer: COMMERCIAL

## 2022-03-08 ENCOUNTER — HOSPITAL ENCOUNTER (OUTPATIENT)
Dept: ULTRASOUND IMAGING | Facility: OTHER | Age: 42
End: 2022-03-08
Attending: OBSTETRICS & GYNECOLOGY
Payer: COMMERCIAL

## 2022-03-08 VITALS
DIASTOLIC BLOOD PRESSURE: 80 MMHG | HEART RATE: 82 BPM | WEIGHT: 189.6 LBS | SYSTOLIC BLOOD PRESSURE: 122 MMHG | BODY MASS INDEX: 31.55 KG/M2

## 2022-03-08 DIAGNOSIS — O35.9XX0 KNOWN FETAL ANOMALY, ANTEPARTUM, SINGLE OR UNSPECIFIED FETUS: Primary | ICD-10-CM

## 2022-03-08 DIAGNOSIS — O35.9XX0 KNOWN FETAL ANOMALY, ANTEPARTUM, SINGLE OR UNSPECIFIED FETUS: ICD-10-CM

## 2022-03-08 DIAGNOSIS — O09.529 SUPERVISION OF HIGH-RISK PREGNANCY OF ELDERLY MULTIGRAVIDA: ICD-10-CM

## 2022-03-08 PROCEDURE — 76815 OB US LIMITED FETUS(S): CPT

## 2022-03-08 PROCEDURE — 99207 PR OB VISIT-NO CHARGE - GICH ONLY: CPT | Performed by: STUDENT IN AN ORGANIZED HEALTH CARE EDUCATION/TRAINING PROGRAM

## 2022-03-08 NOTE — NURSING NOTE
Pt presents to clinic today for prenatal care 38w5d. Pt denies any contractions, bleeding, or leakage of fluid at this time. Pt does baby has not moved in the last 2 days.    Medication Reconciliation: complete  Yoko Warner LPN

## 2022-03-08 NOTE — PROGRESS NOTES
Return OB Visit    S: Ms. Moncada reported that she has not felt her baby move in over 2 days. She is anxious and notes that she has had this happen once more when her baby flipped from breech to cephalic. She endorses some mild cramping, no strong contractions, LOF or VB.     O:   /80   Pulse 82   Wt 86 kg (189 lb 9.6 oz)   LMP 06/10/2021 (Within Days)   BMI 31.55 kg/m      Gen: Well-appearing, NAD     bpm    Assessment:  Ms. Christine Moncada is a 42 year old yo  here for an OB follow up visit. She is currently 38w5d. This pregnancy is complicated by suspected T18, AMA, moderate polyhydramnios.    Plan:  # Routine Prenatal Care  -- Dating: LMP=9w2d US ZACH: 3/17/2022  -- PNLs:   O+, Ab screen neg   RPR nr   Hep B S Ag neg   Rubella immune   HIV neg    -- Genetic Screening: declined  -- Immunizations: flu 12/15/21, Tdap declined  -- 3rd TM labs including CBC, RPR: Hgb 13.4, RPR nr  -- 1 hr GTT: declines  -- GBS: Negative  -- Postpartum Planning: Nuva Ring  -- Delivery Planning: see below  -- Planning to do at next visit: SVE, JENNY    # AMA  -- Declined aneuploidy screening  -- ASA 81 mg recommended    # T18 suspected on level II US  --S/p MFM consult- Desires comfort cares only. Wants to be induced at any point if her health starts to be compromised. Growth US 2022 EFW 1956g (2%ile)    # moderate polyhydramnios  -- Weekly JENNY checks   Today 20 cm    ----------Copied from Dr. Sanchez's note for consistency of care plan-------------------  Care Plan: Plans to have only , possible birth  in room at time of delivery. Will do monitoring in labor only upon request.  Is hoping for grandparents, her children to meet baby after birth if possible. After birth, plan for baby to mother's chest. If baby is breathing and has a heart beat, she will consider vitals to be done on mother's chest. She is okay with NC O2 if it makes baby more comfortable but DOES NOT want further  resuscitation efforts (no chest compressions, no intubation). She is okay with NG tube for feedings if baby will not nurse or take a bottle. She is okay with PO morphine if needed for pain. She will plan to do baby meds if baby survives the first few hours. She will discuss IV/umbilical line with her . she has no [facility] requests for after birth. Is interested in breech vaginal delivery if needed, prefers over  section. Cord blood needs to be collected at time of birth for confirmation of genetics. Has approval for visitors, 2 at a time, for after birth to meet baby, including her children.     Discussed MFM recommendations would pertain to a baby that was expected to do better after delivery than in utero, which is no guarantee for her baby. After lengthy discussion, patient wants to continue with expectant management.     Hx of VAVD x2  Hx of retained placenta with PPH  Considering NuvaRing for contraception     PNC: Rh positive, Rubella immune. Declines GCT  Genetics: declined  Imaging: dating US at 9w5d  Immunizations: s/p COVID series. declines Tdap.  RTC weekly with JENNY checks     Chelsey Anne MD  OB/GYN  3/8/2022 3:40 PM

## 2022-03-13 ENCOUNTER — NURSE TRIAGE (OUTPATIENT)
Dept: NURSING | Facility: CLINIC | Age: 42
End: 2022-03-13
Payer: COMMERCIAL

## 2022-03-14 ENCOUNTER — HOSPITAL ENCOUNTER (INPATIENT)
Facility: OTHER | Age: 42
LOS: 1 days | Discharge: HOME | End: 2022-03-15
Attending: OBSTETRICS & GYNECOLOGY | Admitting: OBSTETRICS & GYNECOLOGY
Payer: COMMERCIAL

## 2022-03-14 PROBLEM — Z37.9 NORMAL LABOR: Status: ACTIVE | Noted: 2022-03-14

## 2022-03-14 LAB
ABO/RH(D): NORMAL
ANTIBODY SCREEN: NEGATIVE
APTT PPP: 29 SECONDS (ref 22–38)
ERYTHROCYTE [DISTWIDTH] IN BLOOD BY AUTOMATED COUNT: 13.2 % (ref 10–15)
FIBRINOGEN PPP-MCNC: 591 MG/DL (ref 170–490)
FLUAV RNA SPEC QL NAA+PROBE: NEGATIVE
FLUBV RNA RESP QL NAA+PROBE: NEGATIVE
HCT VFR BLD AUTO: 39.1 % (ref 35–47)
HGB BLD-MCNC: 12.7 G/DL (ref 11.7–15.7)
INR PPP: 1.05 (ref 0.85–1.15)
MCH RBC QN AUTO: 29.1 PG (ref 26.5–33)
MCHC RBC AUTO-ENTMCNC: 32.5 G/DL (ref 31.5–36.5)
MCV RBC AUTO: 90 FL (ref 78–100)
PLATELET # BLD AUTO: 300 10E3/UL (ref 150–450)
RBC # BLD AUTO: 4.37 10E6/UL (ref 3.8–5.2)
RSV RNA SPEC NAA+PROBE: NEGATIVE
SARS-COV-2 RNA RESP QL NAA+PROBE: NEGATIVE
SPECIMEN EXPIRATION DATE: NORMAL
WBC # BLD AUTO: 9.4 10E3/UL (ref 4–11)

## 2022-03-14 PROCEDURE — 59400 OBSTETRICAL CARE: CPT | Performed by: OBSTETRICS & GYNECOLOGY

## 2022-03-14 PROCEDURE — 250N000011 HC RX IP 250 OP 636: Performed by: OBSTETRICS & GYNECOLOGY

## 2022-03-14 PROCEDURE — 88262 CHROMOSOME ANALYSIS 15-20: CPT

## 2022-03-14 PROCEDURE — 86850 RBC ANTIBODY SCREEN: CPT | Performed by: OBSTETRICS & GYNECOLOGY

## 2022-03-14 PROCEDURE — 85610 PROTHROMBIN TIME: CPT | Performed by: OBSTETRICS & GYNECOLOGY

## 2022-03-14 PROCEDURE — 120N000001 HC R&B MED SURG/OB

## 2022-03-14 PROCEDURE — 86901 BLOOD TYPING SEROLOGIC RH(D): CPT | Performed by: OBSTETRICS & GYNECOLOGY

## 2022-03-14 PROCEDURE — 85730 THROMBOPLASTIN TIME PARTIAL: CPT | Performed by: OBSTETRICS & GYNECOLOGY

## 2022-03-14 PROCEDURE — 87637 SARSCOV2&INF A&B&RSV AMP PRB: CPT | Performed by: OBSTETRICS & GYNECOLOGY

## 2022-03-14 PROCEDURE — 250N000013 HC RX MED GY IP 250 OP 250 PS 637: Performed by: OBSTETRICS & GYNECOLOGY

## 2022-03-14 PROCEDURE — 85384 FIBRINOGEN ACTIVITY: CPT | Performed by: OBSTETRICS & GYNECOLOGY

## 2022-03-14 PROCEDURE — 88307 TISSUE EXAM BY PATHOLOGIST: CPT

## 2022-03-14 PROCEDURE — 85027 COMPLETE CBC AUTOMATED: CPT | Performed by: OBSTETRICS & GYNECOLOGY

## 2022-03-14 PROCEDURE — G0463 HOSPITAL OUTPT CLINIC VISIT: HCPCS | Mod: 25

## 2022-03-14 PROCEDURE — 258N000003 HC RX IP 258 OP 636: Performed by: OBSTETRICS & GYNECOLOGY

## 2022-03-14 PROCEDURE — 722N000001 HC LABOR CARE VAGINAL DELIVERY SINGLE

## 2022-03-14 PROCEDURE — 88233 TISSUE CULTURE SKIN/BIOPSY: CPT

## 2022-03-14 PROCEDURE — 36415 COLL VENOUS BLD VENIPUNCTURE: CPT | Performed by: OBSTETRICS & GYNECOLOGY

## 2022-03-14 RX ORDER — MISOPROSTOL 100 UG/1
400 TABLET ORAL
Status: DISCONTINUED | OUTPATIENT
Start: 2022-03-14 | End: 2022-03-14 | Stop reason: HOSPADM

## 2022-03-14 RX ORDER — CARBOPROST TROMETHAMINE 250 UG/ML
250 INJECTION, SOLUTION INTRAMUSCULAR
Status: DISCONTINUED | OUTPATIENT
Start: 2022-03-14 | End: 2022-03-15 | Stop reason: HOSPADM

## 2022-03-14 RX ORDER — BISACODYL 10 MG
10 SUPPOSITORY, RECTAL RECTAL DAILY PRN
Status: DISCONTINUED | OUTPATIENT
Start: 2022-03-14 | End: 2022-03-15 | Stop reason: HOSPADM

## 2022-03-14 RX ORDER — IBUPROFEN 600 MG/1
600 TABLET, FILM COATED ORAL
Status: DISCONTINUED | OUTPATIENT
Start: 2022-03-14 | End: 2022-03-14

## 2022-03-14 RX ORDER — OXYTOCIN/0.9 % SODIUM CHLORIDE 30/500 ML
100-340 PLASTIC BAG, INJECTION (ML) INTRAVENOUS CONTINUOUS PRN
Status: DISCONTINUED | OUTPATIENT
Start: 2022-03-14 | End: 2022-03-14

## 2022-03-14 RX ORDER — ONDANSETRON 4 MG/1
4 TABLET, ORALLY DISINTEGRATING ORAL EVERY 6 HOURS PRN
Status: DISCONTINUED | OUTPATIENT
Start: 2022-03-14 | End: 2022-03-14 | Stop reason: HOSPADM

## 2022-03-14 RX ORDER — KETOROLAC TROMETHAMINE 30 MG/ML
30 INJECTION, SOLUTION INTRAMUSCULAR; INTRAVENOUS
Status: DISCONTINUED | OUTPATIENT
Start: 2022-03-14 | End: 2022-03-14

## 2022-03-14 RX ORDER — METOCLOPRAMIDE HYDROCHLORIDE 5 MG/ML
10 INJECTION INTRAMUSCULAR; INTRAVENOUS EVERY 6 HOURS PRN
Status: DISCONTINUED | OUTPATIENT
Start: 2022-03-14 | End: 2022-03-14 | Stop reason: HOSPADM

## 2022-03-14 RX ORDER — PROCHLORPERAZINE 25 MG
25 SUPPOSITORY, RECTAL RECTAL EVERY 12 HOURS PRN
Status: DISCONTINUED | OUTPATIENT
Start: 2022-03-14 | End: 2022-03-14 | Stop reason: HOSPADM

## 2022-03-14 RX ORDER — ACETAMINOPHEN 325 MG/1
650 TABLET ORAL EVERY 4 HOURS PRN
Status: DISCONTINUED | OUTPATIENT
Start: 2022-03-14 | End: 2022-03-15 | Stop reason: HOSPADM

## 2022-03-14 RX ORDER — ONDANSETRON 2 MG/ML
4 INJECTION INTRAMUSCULAR; INTRAVENOUS EVERY 6 HOURS PRN
Status: DISCONTINUED | OUTPATIENT
Start: 2022-03-14 | End: 2022-03-14 | Stop reason: HOSPADM

## 2022-03-14 RX ORDER — OXYTOCIN 10 [USP'U]/ML
10 INJECTION, SOLUTION INTRAMUSCULAR; INTRAVENOUS
Status: DISCONTINUED | OUTPATIENT
Start: 2022-03-14 | End: 2022-03-14 | Stop reason: HOSPADM

## 2022-03-14 RX ORDER — TRANEXAMIC ACID 10 MG/ML
1 INJECTION, SOLUTION INTRAVENOUS EVERY 30 MIN PRN
Status: DISCONTINUED | OUTPATIENT
Start: 2022-03-14 | End: 2022-03-14 | Stop reason: HOSPADM

## 2022-03-14 RX ORDER — OXYTOCIN 10 [USP'U]/ML
10 INJECTION, SOLUTION INTRAMUSCULAR; INTRAVENOUS
Status: DISCONTINUED | OUTPATIENT
Start: 2022-03-14 | End: 2022-03-15 | Stop reason: HOSPADM

## 2022-03-14 RX ORDER — METHYLERGONOVINE MALEATE 0.2 MG/ML
200 INJECTION INTRAVENOUS
Status: DISCONTINUED | OUTPATIENT
Start: 2022-03-14 | End: 2022-03-14 | Stop reason: HOSPADM

## 2022-03-14 RX ORDER — METOCLOPRAMIDE 10 MG/1
10 TABLET ORAL EVERY 6 HOURS PRN
Status: DISCONTINUED | OUTPATIENT
Start: 2022-03-14 | End: 2022-03-14 | Stop reason: HOSPADM

## 2022-03-14 RX ORDER — NALOXONE HYDROCHLORIDE 0.4 MG/ML
0.4 INJECTION, SOLUTION INTRAMUSCULAR; INTRAVENOUS; SUBCUTANEOUS
Status: DISCONTINUED | OUTPATIENT
Start: 2022-03-14 | End: 2022-03-14 | Stop reason: HOSPADM

## 2022-03-14 RX ORDER — FENTANYL CITRATE 50 UG/ML
25 INJECTION, SOLUTION INTRAMUSCULAR; INTRAVENOUS ONCE
Status: DISCONTINUED | OUTPATIENT
Start: 2022-03-14 | End: 2022-03-14 | Stop reason: HOSPADM

## 2022-03-14 RX ORDER — DOCUSATE SODIUM 100 MG/1
100 CAPSULE, LIQUID FILLED ORAL DAILY
Status: DISCONTINUED | OUTPATIENT
Start: 2022-03-14 | End: 2022-03-15 | Stop reason: HOSPADM

## 2022-03-14 RX ORDER — TRANEXAMIC ACID 10 MG/ML
1 INJECTION, SOLUTION INTRAVENOUS EVERY 30 MIN PRN
Status: DISCONTINUED | OUTPATIENT
Start: 2022-03-14 | End: 2022-03-15 | Stop reason: HOSPADM

## 2022-03-14 RX ORDER — OXYTOCIN 10 [USP'U]/ML
10 INJECTION, SOLUTION INTRAMUSCULAR; INTRAVENOUS
Status: DISCONTINUED | OUTPATIENT
Start: 2022-03-14 | End: 2022-03-14

## 2022-03-14 RX ORDER — OXYTOCIN/0.9 % SODIUM CHLORIDE 30/500 ML
340 PLASTIC BAG, INJECTION (ML) INTRAVENOUS CONTINUOUS PRN
Status: DISCONTINUED | OUTPATIENT
Start: 2022-03-14 | End: 2022-03-14 | Stop reason: HOSPADM

## 2022-03-14 RX ORDER — ZOLPIDEM TARTRATE 5 MG/1
5 TABLET ORAL
Status: DISCONTINUED | OUTPATIENT
Start: 2022-03-14 | End: 2022-03-15 | Stop reason: HOSPADM

## 2022-03-14 RX ORDER — HYDROCORTISONE 2.5 %
CREAM (GRAM) TOPICAL 3 TIMES DAILY PRN
Status: DISCONTINUED | OUTPATIENT
Start: 2022-03-14 | End: 2022-03-15 | Stop reason: HOSPADM

## 2022-03-14 RX ORDER — METHYLERGONOVINE MALEATE 0.2 MG/ML
200 INJECTION INTRAVENOUS
Status: DISCONTINUED | OUTPATIENT
Start: 2022-03-14 | End: 2022-03-15 | Stop reason: HOSPADM

## 2022-03-14 RX ORDER — MISOPROSTOL 100 UG/1
400 TABLET ORAL
Status: DISCONTINUED | OUTPATIENT
Start: 2022-03-14 | End: 2022-03-15 | Stop reason: HOSPADM

## 2022-03-14 RX ORDER — NALBUPHINE HYDROCHLORIDE 10 MG/ML
2.5-5 INJECTION, SOLUTION INTRAMUSCULAR; INTRAVENOUS; SUBCUTANEOUS EVERY 6 HOURS PRN
Status: DISCONTINUED | OUTPATIENT
Start: 2022-03-14 | End: 2022-03-14

## 2022-03-14 RX ORDER — PROCHLORPERAZINE MALEATE 10 MG
10 TABLET ORAL EVERY 6 HOURS PRN
Status: DISCONTINUED | OUTPATIENT
Start: 2022-03-14 | End: 2022-03-14 | Stop reason: HOSPADM

## 2022-03-14 RX ORDER — IBUPROFEN 400 MG/1
800 TABLET, FILM COATED ORAL EVERY 6 HOURS PRN
Status: DISCONTINUED | OUTPATIENT
Start: 2022-03-14 | End: 2022-03-15 | Stop reason: HOSPADM

## 2022-03-14 RX ORDER — FENTANYL CITRATE-0.9 % NACL/PF 10 MCG/ML
100 PLASTIC BAG, INJECTION (ML) INTRAVENOUS EVERY 5 MIN PRN
Status: DISCONTINUED | OUTPATIENT
Start: 2022-03-14 | End: 2022-03-14 | Stop reason: HOSPADM

## 2022-03-14 RX ORDER — OXYTOCIN/0.9 % SODIUM CHLORIDE 30/500 ML
340 PLASTIC BAG, INJECTION (ML) INTRAVENOUS CONTINUOUS PRN
Status: DISCONTINUED | OUTPATIENT
Start: 2022-03-14 | End: 2022-03-15 | Stop reason: HOSPADM

## 2022-03-14 RX ORDER — NALOXONE HYDROCHLORIDE 0.4 MG/ML
0.2 INJECTION, SOLUTION INTRAMUSCULAR; INTRAVENOUS; SUBCUTANEOUS
Status: DISCONTINUED | OUTPATIENT
Start: 2022-03-14 | End: 2022-03-14 | Stop reason: HOSPADM

## 2022-03-14 RX ORDER — MORPHINE SULFATE 0.5 MG/ML
100 INJECTION, SOLUTION EPIDURAL; INTRATHECAL; INTRAVENOUS ONCE
Status: DISCONTINUED | OUTPATIENT
Start: 2022-03-14 | End: 2022-03-14 | Stop reason: HOSPADM

## 2022-03-14 RX ORDER — MODIFIED LANOLIN
OINTMENT (GRAM) TOPICAL
Status: DISCONTINUED | OUTPATIENT
Start: 2022-03-14 | End: 2022-03-15 | Stop reason: HOSPADM

## 2022-03-14 RX ORDER — CARBOPROST TROMETHAMINE 250 UG/ML
250 INJECTION, SOLUTION INTRAMUSCULAR
Status: DISCONTINUED | OUTPATIENT
Start: 2022-03-14 | End: 2022-03-14 | Stop reason: HOSPADM

## 2022-03-14 RX ADMIN — KETOROLAC TROMETHAMINE 30 MG: 30 INJECTION, SOLUTION INTRAMUSCULAR at 06:11

## 2022-03-14 RX ADMIN — ONDANSETRON 4 MG: 2 INJECTION INTRAMUSCULAR; INTRAVENOUS at 05:17

## 2022-03-14 RX ADMIN — SODIUM CHLORIDE, POTASSIUM CHLORIDE, SODIUM LACTATE AND CALCIUM CHLORIDE 1000 ML: 600; 310; 30; 20 INJECTION, SOLUTION INTRAVENOUS at 04:38

## 2022-03-14 RX ADMIN — IBUPROFEN 800 MG: 400 TABLET, FILM COATED ORAL at 12:00

## 2022-03-14 RX ADMIN — IBUPROFEN 800 MG: 400 TABLET, FILM COATED ORAL at 18:27

## 2022-03-14 RX ADMIN — DOCUSATE SODIUM 100 MG: 100 CAPSULE, LIQUID FILLED ORAL at 12:00

## 2022-03-14 ASSESSMENT — ACTIVITIES OF DAILY LIVING (ADL)
FALL_HISTORY_WITHIN_LAST_SIX_MONTHS: NO
HEARING_DIFFICULTY_OR_DEAF: NO
WEAR_GLASSES_OR_BLIND: NO
DOING_ERRANDS_INDEPENDENTLY_DIFFICULTY: NO
CHANGE_IN_FUNCTIONAL_STATUS_SINCE_ONSET_OF_CURRENT_ILLNESS/INJURY: NO
DRESSING/BATHING_DIFFICULTY: NO
DIFFICULTY_EATING/SWALLOWING: NO
TOILETING_ISSUES: NO
WALKING_OR_CLIMBING_STAIRS_DIFFICULTY: NO
CONCENTRATING,_REMEMBERING_OR_MAKING_DECISIONS_DIFFICULTY: NO
DIFFICULTY_COMMUNICATING: NO

## 2022-03-14 NOTE — PROGRESS NOTES
Gonzalez Schulz from [facility] home here. Talking with parents. All forms completed.

## 2022-03-14 NOTE — PROGRESS NOTES
Pt found to be complete and wanting to push. Pt does not want to proceed with IT at this point. CRNA notified.

## 2022-03-14 NOTE — H&P
[facility] Labor and Delivery History and Physical    Christine Moncada MRN# [id number]   Age: 42 year old YOB: 1980     Date of Admission:  3/14/2022    Primary care provider: Baylee Lyle           Chief Complaint:   Christine Moncada is a 42 year old  at 39w4d by LMP c/w 9w2d US admitted for spontaneous labor, SROM. Was arianne every 10 minutes until her water broke, copious fluid. Has not felt baby move in a week.          Pregnancy history:     OBSTETRIC HISTORY:    OB History    Para Term  AB Living   6 3 3 0 2 3   SAB IAB Ectopic Multiple Live Births   2 0 0 0 3      # Outcome Date GA Lbr Yao/2nd Weight Sex Delivery Anes PTL Lv   6 Current            5 SAB 19 9w4d    SAB      4 Term 18 41w2d 04:03 / 00:13 3.731 kg (8 lb 3.6 oz) M Vag-Spont INT N ANGELI      Name: CAYLA MONCADA      Apgar1: 8  Apgar5: 9   3 Term 16 38w4d  3.654 kg (8 lb 0.9 oz) M Vag-Vacuum INT N ANGELI      Complications: Excessive Vaginal Bleeding, Retained placenta      Name: Manpreet      Apgar1: 9  Apgar5: 9   2 SAB 10/27/14 9w0d   U SAB  N    1 Term 12/15/09 39w5d  3.884 kg (8 lb 9 oz) M IVD INT N ANGELI      Name: Balwinder      Obstetric Comments   Pregnancy risk factors include: AMA, History of PPH, vacuum delivery x 2, cardiac dysrhythmia.       EDC: Estimated Date of Delivery: Mar 17, 2022    Prenatal Labs:   Lab Results   Component Value Date    ABO O 2018    RH Pos 2018    AS Negative 2021    HEPBANG Nonreactive 2021    TREPAB Negative 2018    HGB 13.4 2021       GBS Status:   negative    Active Problem List  Patient Active Problem List   Diagnosis     Dyspareunia (CODE)     Hip disease       Medication Prior to Admission  Medications Prior to Admission   Medication Sig Dispense Refill Last Dose     alcohol swab prep pads Use to swab area of injection/phill as directed. 400 each 3      blood glucose (NO  BRAND SPECIFIED) test strip Use to test blood sugar 4 times daily or as directed. To accompany: Blood Glucose Monitor Brands: per insurance. 400 strip 3      blood glucose calibration (NO BRAND SPECIFIED) solution To accompany: Blood Glucose Monitor Brands: per insurance. 1 each 3      blood glucose monitoring (NO BRAND SPECIFIED) meter device kit Use to test blood sugar 4 times daily or as directed. Preferred blood glucose meter OR supplies to accompany: Blood Glucose Monitor Brands: per insurance. 1 kit 0      metroNIDAZOLE (METROCREAM) 0.75 % external cream Apply topically 2 times daily (Patient not taking: Reported on 3/8/2022) 45 g 3      Prenatal MV-Min-Fe Fum-FA-DHA (PRENATAL 1 PO) NO iron        thin (NO BRAND SPECIFIED) lancets Use with lanceting device. To accompany: Blood Glucose Monitor Brands: per insurance. 400 each 3    .        Maternal Past Medical History:     Past Medical History:   Diagnosis Date     Abdominal migraine     ,abdominal, quiescent     Encounter for full-term uncomplicated delivery     VAVD x2     Joint disorder     2014     Major depressive disorder, single episode     remote hx     Other idiopathic scoliosis, site unspecified     in childhood     Past Surgical History:   Procedure Laterality Date     MT HIP ARTHROSCOPY, DX  2014    hip labrum repair                       Family History:     Family History   Problem Relation Age of Onset     Other - See Comments Mother          due to brother's large size               Social History:     Social History     Tobacco Use     Smoking status: Never Smoker     Smokeless tobacco: Never Used     Tobacco comment: Quit smoking: In childhood home   Substance Use Topics     Alcohol use: Not Currently            Review of Systems:   The Review of Systems is negative other than noted in the HPI          Physical Exam:     Patient Vitals for the past 8 hrs:   BP Temp Temp src Pulse Resp SpO2   22 0433 -- 97  F (36.1  C)  Temporal 83 18 100 %   22 0432 134/80 -- -- -- -- --     Gen: resting in bed, breathing through contractions  CV: RRR  Resp: CTAB  Abd: gravid, soft, nontender  Ext: nontender    Cervix: 5-6 cm per RN  Membranes: SROM  EFW: 2500g  Presentation:Cephalic by US    FHT: no cardiac activity detected by ultrasound        Assessment:   Christine Moncada is a 42 year old  at 39w4d admitted for spontaneous labor, SROM with pregnancy complicated by suspected T18 based on cfDNA and ultrasound findings, found to have IUFD upon admission          Plan:   Fetus: will plan to obtain karyotype for confirmation of genetics  Labor: spontaneous  Pain: Per patient request  Rh positive, rubella immune  Anticipate     Josey Sanchez MD

## 2022-03-14 NOTE — PROGRESS NOTES
Pt presents to B stating that she has been having irregular contractions since about 2200 last evening. She states her water broke 0330 this morning. Fluid is dark brown in color in large amounts. Please see previous notes for obstetrical history. We will not be doing any monitoring of baby, nor contractions.

## 2022-03-14 NOTE — PROGRESS NOTES
[facility] [place], [place] notified of fetal demise & parent's wishes for cremation.

## 2022-03-14 NOTE — PROGRESS NOTES
Call placed to Dr. DARREL Sanchez to update on pt's arrival/status. Dr. Sanchez to present to hospital.

## 2022-03-14 NOTE — PLAN OF CARE
Patient is staying the night. VSS. Fundus at umbilicus and firm. Patient voiding spontaneously without difficulty. Light to scant bleeding. Denies nausea. Passing flatus. Fetal demise baby was picked up by [facility]. All mementos with patient. Foot molds are in the cupboard setting up. All required documentation is complete.     /68 (BP Location: Right arm, Patient Position: Sitting, Cuff Size: Adult Regular)   Pulse 68   Temp 97.5  F (36.4  C) (Temporal)   Resp 14   LMP 06/10/2021 (Within Days)   SpO2 99%     Kenroy Maya RN on 3/14/2022 at 6:23 PM

## 2022-03-14 NOTE — L&D DELIVERY NOTE
OB Vaginal Delivery Note    Christine Moncada MRN# [id number]   Age: 42 year old YOB: 1980       GA: 39w4d  GP:   Labor Complications: None   EBL:   mL  Delivery QBL:    Delivery Type: Vaginal, Spontaneous   ROM to Delivery Time: Hours: 2 Minutes: 45   Weight:     1 Minute 5 Minute 10 Minute   Apgar Totals: 0   0   0     VALENTINO MORALES     Delivery Details:  Christine Moncada, a 42 year old  who presented at 39w4d with SROM, spontaneous labor. Her pregnancy was complicated by fetus with suspected trisomy 18 based on cell-free fetal DNA screening and ultrasound findings. She had declined prenatal diagnostic testing. Upon admission, she was noted to have a cephalic fetus with no cardiac activity confirmed with ultrasound. She progressed spontaneously through labor to complete dilation. She pushed over 1 contraction and delivered a female stillborn fetus. Placenta delivered spontaneously and appeared intact. A placenta block adjacent to the cord insertion site was excised and sent for karyotype confirmation. No lacerations. QBL pending. Limited fetal exam on mother's chest demonstrated a term female infant with moderate skin sloughing, 5 fingers on each hand, 5 toes on each foot. No facial clefting. Ears appropriately set. Patient declines formal autopsy.     Valentino Morales MD FACOG  OB/GYN  3/14/2022 6:23 AM         Antwan, Joseluising Baby Christine Arevalo FD [[id number]]    Labor Event Times    Labor onset date: 3/14/22    Dilation complete date: 3/14/22 Complete time:  5:55 AM   Start pushing date/time: 3/14/2022 0557      Labor Events     labor?: No   steroids: None  Labor Type: Spontaneous     Antibiotics received during labor?: No     Rupture identifier: Sac 1  Rupture date/time: 3/14/22 0335   Rupture type: Spontaneous rupture of membranes occuring during spontaneous labor or augmentation  Fluid color: Other (comment)     1:1 continuous labor support provided by?:  RN Labor partogram used?: no      Delivery/Placenta Date and Time     Other personnel present at delivery:  Provider Role   Josey Sanchez MD Obstetrician         Apgars    Living status: Fetal Demise   1 Minute 5 Minute 10 Minute 15 Minute 20 Minute   Skin color: 0  0  0  0  0    Heart rate: 0  0  0  0  0    Reflex irritability: 0  0  0  0  0    Muscle tone: 0  0  0  0  0    Respiratory effort: 0  0  0  0  0    Total: 0  0  0  0  0       Cord    Cord Complications: None               Stem cell collection?: No       Labor Events and Shoulder Dystocia    Fetal Tracing Comments: stillbirth  Shoulder dystocia present?: Neg     Delivery (Maternal) (Provider to Complete) (137851)    Episiotomy: None  Perineal lacerations: None    Genital tract inspection done: Pos     Blood Loss  Mother: AntwanChristine #[id number]   Start of Mother's Information    Delivery Blood Loss  03/13/22 1820 - 03/14/22 0620    None           End of Mother's Information  Mother: Antwan Christine Arevalo V #[id number]          Delivery - Provider to Complete ([id number])    Delivery Type (Choose the 1 that will go to the Birth History): Vaginal, Spontaneous                   Other personnel:  Provider Role   Josey Sanchez MD Obstetrician                Placenta    Removal: Expressed  Disposition: Pathology           Anesthesia    Method: None                Presentation and Position    Presentation: Vertex    Position: Left Occiput Anterior                 Josey Sanchez MD

## 2022-03-14 NOTE — PROGRESS NOTES
Pt delivered a stillborn baby girl at 0601 by Dr. Sanchez. Placenta delivered at 0603 without difficulty. Fundus found to be firm, bleeding is light. Will continue to monitor and provide interventions as needed.

## 2022-03-14 NOTE — TELEPHONE ENCOUNTER
Patient is pregnant almost 40 weeks.  Having some contractions and small amount mucus brown discharge.    OB Triage Call      Is patient's OB/Midwife with the [facility] or [facility]? [facility]- Proceed with triage     Reason for call: Patient thinks she may be going into labor.      Assessment: She says she have lost her mucus plug and is having contractions 10 or more minutes apart.  She has not felt the baby move much this past week but has been examined and baby ok.  Denies baby is moving less today.    Plan: Home care advice and will call back when contractions become less than 10 minutes apart or ROM.    Patient plans to deliver at [facility]    Patient's primary OB Provider is Ina Sanchez MD.      Per protocol recommendations Patient to follow home care recommendations. An FYI page or consult is not needed unless patient is requesting to speak to midwife or MD, they are in labor, or it is recommended by triage protocol    Is patient's delivering hospital on divert? Does not apply due to Patient given home care instructions      39w3d    Estimated Date of Delivery: Mar 17, 2022        OB History    Para Term  AB Living   6 3 3 0 2 3   SAB IAB Ectopic Multiple Live Births   2 0 0 0 3      # Outcome Date GA Lbr Yao/2nd Weight Sex Delivery Anes PTL Lv   6 Current            5 SAB 19 9w4d    SAB      4 Term 18 41w2d 04:03 / 00:13 3.731 kg (8 lb 3.6 oz) M Vag-Spont INT N ANGELI      Name: CAYLA DALY      Apgar1: 8  Apgar5: 9   3 Term 16 38w4d  3.654 kg (8 lb 0.9 oz) M Vag-Vacuum INT N ANGELI      Complications: Excessive Vaginal Bleeding, Retained placenta      Name: Manpreet      Apgar1: 9  Apgar5: 9   2 SAB 10/27/14 9w0d   U SAB  N    1 Term 12/15/09 39w5d  3.884 kg (8 lb 9 oz) M IVD INT N ANGELI      Name: Balwinder      Obstetric Comments   Pregnancy risk factors include: AMA, History of PPH, vacuum delivery x 2, cardiac dysrhythmia.       No results found for:  "GBS       Jocelin Adams RN 03/13/22 9:49 PM  [facility] Nurse Advisor    Reason for Disposition    [1] History of prior delivery (multipara) AND [2] contractions > 10 minutes, or for < 1 hour    Additional Information    Negative: Passed out (i.e., lost consciousness, collapsed and was not responding)    Negative: Shock suspected (e.g., cold/pale/clammy skin, too weak to stand, low BP, rapid pulse)    Negative: Difficult to awaken or acting confused (e.g., disoriented, slurred speech)    Negative: [1] SEVERE abdominal pain (e.g., excruciating) AND [2] constant AND [3] present > 1 hour    Negative: Severe bleeding (e.g., continuous red blood from vagina, or large blood clots)    Negative: Umbilical cord hanging out of the vagina (shiny, white, curled appearance, \"like telephone cord\")    Negative: Uncontrollable urge to push (i.e., feels like baby is coming out now)    Negative: Can see baby    Negative: Sounds like a life-threatening emergency to the triager    Negative: [1] First baby (primipara) AND [2] contractions < 6 minutes apart  AND [3] present 2 hours    Negative: [1] History of prior delivery (multipara) AND [2] contractions < 10 minutes apart AND [3] present 1 hour    Negative: [1] History of rapid prior delivery AND [2] contractions < 10 minutes apart    Negative: [1] Leakage of fluid from vagina AND [2] green or brown in color    Negative: [1] Leakage of fluid from vagina AND [2] leakage started > 4 hours ago    Negative: Vaginal bleeding or spotting    Negative: Baby moving less today (e.g., kick count < 5 in 1 hour or < 10 in 2 hours)    Negative: Severe headache or headache that won't go away    Negative: New blurred vision or vision changes    Negative: MODERATE-SEVERE abdominal pain    Negative: [1] MILD abdominal pain (e.g., doesn't interfere with normal activities) AND [2] constant AND [3] present > 2 hours    Negative: Fever > 100.4 F (38.0 C)    Negative: [1] Leakage of fluid from " vagina AND [2] leakage started < 4 hours ago    Negative: Patient sounds very sick or weak to the triager    Negative: [1] First baby (primipara) AND [2] contractions > 5 minutes apart, or for < 2 hours    Protocols used: PREGNANCY - LABOR-A-AH

## 2022-03-15 VITALS
DIASTOLIC BLOOD PRESSURE: 54 MMHG | SYSTOLIC BLOOD PRESSURE: 101 MMHG | OXYGEN SATURATION: 96 % | TEMPERATURE: 96.6 F | HEART RATE: 54 BPM | RESPIRATION RATE: 16 BRPM

## 2022-03-15 PROCEDURE — 250N000013 HC RX MED GY IP 250 OP 250 PS 637: Performed by: OBSTETRICS & GYNECOLOGY

## 2022-03-15 RX ADMIN — DOCUSATE SODIUM 100 MG: 100 CAPSULE, LIQUID FILLED ORAL at 07:37

## 2022-03-15 RX ADMIN — IBUPROFEN 800 MG: 400 TABLET, FILM COATED ORAL at 05:52

## 2022-03-15 NOTE — DISCHARGE SUMMARY
VAGINAL DELIVERY DISCHARGE SUMMARY    Admit date: 3/14/2022  Discharge date: 3/15/2022     Admit Dx:   - 42 year old  at 39w4d    - Spontaneous labor, SROM  - Fetus with suspected trisomy 18  - Intrauterine fetal demise      Discharge Dx:  - Same as above, s/p     Procedures:  - Spontaneous vaginal delivery    Admit HPI:  Ms. Christine Moncada is a   at 39w4d  who was admitted for spontaneous labor, SROM on 3/14/2022. Upon admission, was found to have an IUFD. Please see her admit H&P for full details of her PMH, PSH, Meds, Allergies and exam on admit.    Hospital course:  Christine Moncada, a 42 year old  who presented at 39w4d with SROM, spontaneous labor. Her pregnancy was complicated by fetus with suspected trisomy 18 based on cell-free fetal DNA screening and ultrasound findings. She had declined prenatal diagnostic testing. Upon admission, she was noted to have a cephalic fetus with no cardiac activity confirmed with ultrasound. She progressed spontaneously through labor to complete dilation. She pushed over 1 contraction and delivered a female stillborn fetus. Weight 2075g. Placenta delivered spontaneously and appeared intact. A placenta block adjacent to the cord insertion site was excised and sent for karyotype confirmation. No lacerations.  Limited fetal exam on mother's chest demonstrated a term female infant with moderate skin sloughing, 5 fingers on each hand, 5 toes on each foot. No facial clefting. Ears appropriately set. Patient declines formal autopsy. Please see her Delivery Summary for full details regarding her delivery.    Her postpartum course was complicated by nothing. On PPD#1, she was meeting all of her postpartum goals and deemed stable for discharge. She was voiding without difficulty, tolerating a regular diet without nausea and vomiting, her pain was well controlled on oral pain medicines and her lochia was appropriate. Her Rh status was positive, and Rhogam was  not indicated.     Discharge Medications:  Discharge Medication List as of 3/15/2022  8:54 AM      CONTINUE these medications which have NOT CHANGED    Details   alcohol swab prep pads Use to swab area of injection/phill as directed.Disp-400 each, [id number]      blood glucose (NO BRAND SPECIFIED) test strip Use to test blood sugar 4 times daily or as directed. To accompany: Blood Glucose Monitor Brands: per insurance., Disp-400 strip, R-3, E-Prescribe      blood glucose calibration (NO BRAND SPECIFIED) solution To accompany: Blood Glucose Monitor Brands: per insurance., Disp-1 each, R-3, E-Prescribe      blood glucose monitoring (NO BRAND SPECIFIED) meter device kit Use to test blood sugar 4 times daily or as directed. Preferred blood glucose meter OR supplies to accompany: Blood Glucose Monitor Brands: per insurance.Disp-1 kit, [id number]      metroNIDAZOLE (METROCREAM) 0.75 % external cream Apply topically 2 times dailyDisp-45 g,[id number]      Prenatal MV-Min-Fe Fum-FA-DHA (PRENATAL 1 PO) NO iron, Historical      thin (NO BRAND SPECIFIED) lancets Use with lanceting device. To accompany: Blood Glucose Monitor Brands: per insurance., Disp-400 each, R-3, E-Prescribe             Discharge/Disposition:  Christine Moncada was discharged to home in stable condition with the following instructions/medications:  1) Call for temperature > 100.4, bright red vaginal bleeding >1 pad an hour x 2 hours, foul smelling vaginal discharge, pain not controlled by usual oral pain meds, persistent nausea and vomiting not controlled on medications  2) She desired NuvaRing for contraception.  3) She was instructed to follow-up with Dr Sanchez in 6 weeks for a routine postpartum visit. Postpartum depression was discussed extensively.      Josey Sanchze MD  OBGYN  3/15/2022 8:30 AM

## 2022-03-15 NOTE — PLAN OF CARE
Pt is doing well, VSS. She slept most of the night. Fundus is firm, bleeding is light to scant without clots. She is taking Ibuprofen for occasional cramping. Pt has been pleasant and handling the situation very well. Spouse is at bedside and very supportive. Pt would like to discharge home today.

## 2022-03-15 NOTE — PROGRESS NOTES
NSG DISCHARGE NOTE    Patient discharged to home at 0900 AM via ambulation. Accompanied by spouse and staff. Discharge instructions reviewed with patient and spouse, opportunity offered to ask questions. Prescriptions - None ordered for discharge. All belongings sent with patient.    Shey Castellano RN

## 2022-03-15 NOTE — PROGRESS NOTES
OB/GYN Postpartum Note      S:  Patient is feeling as well as can be expected. Physically doing okay.  Lochia light.  Eating and drinking without nausea.  Ambulating without difficulty.  Pain is well controlled. Mentally is still processing. Did family photos yesterday, wanting to go home to see her boys.    O:   Vitals:    22 1655 22 2059 03/15/22 0553 03/15/22 0554   BP: 118/68 108/57  101/54   BP Location: Right arm      Patient Position: Sitting      Cuff Size: Adult Regular      Pulse: 68 73 54    Resp: 14 15 16    Temp: 97.5  F (36.4  C) 97.3  F (36.3  C) (!) 96.6  F (35.9  C)    TempSrc: Temporal Temporal Temporal    SpO2: 99% 98% 96%      General: resting in bed, in NAD  Resp: nonlabored  Abdomen: soft, nontender, nondistended  Fundus firm at umbilicus  Extremities: no edema in BLE    Hemoglobin   Date Value Ref Range Status   2022 12.7 11.7 - 15.7 g/dL Final   2020 13.0 11.7 - 15.7 g/dL Final   ]    A: Ms. Christine Moncada is a 42 year old  PPD #1 s/p  of IUFD secondary to suspected T18    P:  Rh positive, Rubella immune  Discussed postpartum depression extensively  Okay to discharge home    Josey Sanchez MD FACOG  OB/GYN  3/15/2022 8:30 AM

## 2022-03-15 NOTE — DISCHARGE INSTRUCTIONS
Activity: May return to normal activities. Abstain from sexual intercourse x 6 weeks.   Diet: You may eat a regular diet. Drink plenty of fluids.    Call your Doctor if you experience any of the following symptoms:      Soak an entire sanitary pad with blood in 1 hour or you note clots greater than the size of a golf ball.     Bleeding that lasts greater than 6 weeks.    Foul smelling fluid that comes out of your vagina.    A fever greater than 100.4 degrees.    Severe pain, cramping, or tenderness in your lower belly area.    Increased pain, swelling, redness or fluid around your stitches.    A need to urinate more frequently (use the toilet more often), more urgently (use the toilet very quickly), or it burns when you urinate.    Redness, swelling, or pain around a vein in your leg.    Problems coping with sadness, anxiety, or depression.    You have questions or concerns after you return home.    [place]: [phone].

## 2022-03-15 NOTE — PLAN OF CARE
Goal Outcome Evaluation:   Pt and  educated on mental health and well being. Will follow up with heidy Sanchez on any post partum concerns. Signs and symptoms reviewed on Infection, Mental health, post partum hemorrhage, future appointments.

## 2022-03-25 LAB
PATH REPORT.COMMENTS IMP SPEC: NORMAL
PATH REPORT.FINAL DX SPEC: NORMAL
PHOTO IMAGE: NORMAL

## 2022-04-26 ENCOUNTER — PRENATAL OFFICE VISIT (OUTPATIENT)
Dept: OBGYN | Facility: OTHER | Age: 42
End: 2022-04-26
Attending: OBSTETRICS & GYNECOLOGY
Payer: COMMERCIAL

## 2022-04-26 VITALS
SYSTOLIC BLOOD PRESSURE: 118 MMHG | WEIGHT: 176 LBS | BODY MASS INDEX: 29.29 KG/M2 | DIASTOLIC BLOOD PRESSURE: 60 MMHG | HEART RATE: 64 BPM

## 2022-04-26 DIAGNOSIS — Z12.31 ENCOUNTER FOR SCREENING MAMMOGRAM FOR BREAST CANCER: ICD-10-CM

## 2022-04-26 PROCEDURE — 99207 PR POST-PARTUM 6 WK VISIT - GICH ONLY: CPT | Performed by: OBSTETRICS & GYNECOLOGY

## 2022-04-26 ASSESSMENT — ANXIETY QUESTIONNAIRES
GAD7 TOTAL SCORE: 6
2. NOT BEING ABLE TO STOP OR CONTROL WORRYING: SEVERAL DAYS
7. FEELING AFRAID AS IF SOMETHING AWFUL MIGHT HAPPEN: SEVERAL DAYS
GAD7 TOTAL SCORE: 6
3. WORRYING TOO MUCH ABOUT DIFFERENT THINGS: SEVERAL DAYS
4. TROUBLE RELAXING: SEVERAL DAYS
5. BEING SO RESTLESS THAT IT IS HARD TO SIT STILL: NOT AT ALL
1. FEELING NERVOUS, ANXIOUS, OR ON EDGE: SEVERAL DAYS
7. FEELING AFRAID AS IF SOMETHING AWFUL MIGHT HAPPEN: SEVERAL DAYS
GAD7 TOTAL SCORE: 6
6. BECOMING EASILY ANNOYED OR IRRITABLE: SEVERAL DAYS

## 2022-04-26 ASSESSMENT — EDINBURGH POSTNATAL DEPRESSION SCALE (EPDS)
I HAVE BEEN SO UNHAPPY THAT I HAVE BEEN CRYING: ONLY OCCASIONALLY
I HAVE BEEN SO UNHAPPY THAT I HAVE HAD DIFFICULTY SLEEPING: NOT VERY OFTEN
I HAVE LOOKED FORWARD WITH ENJOYMENT TO THINGS: AS MUCH AS I EVER DID
I HAVE BEEN SO UNHAPPY THAT I HAVE HAD DIFFICULTY SLEEPING: NOT VERY OFTEN
I HAVE BEEN ANXIOUS OR WORRIED FOR NO GOOD REASON: YES, SOMETIMES
I HAVE BEEN ABLE TO LAUGH AND SEE THE FUNNY SIDE OF THINGS: AS MUCH AS I ALWAYS COULD
I HAVE BEEN ANXIOUS OR WORRIED FOR NO GOOD REASON: YES, SOMETIMES
THINGS HAVE BEEN GETTING ON TOP OF ME: YES, SOMETIMES I HAVEN'T BEEN COPING AS WELL AS USUAL
I HAVE FELT SCARED OR PANICKY FOR NO GOOD REASON: YES, SOMETIMES
I HAVE BLAMED MYSELF UNNECESSARILY WHEN THINGS WENT WRONG: NO, NEVER
TOTAL SCORE: 9
THE THOUGHT OF HARMING MYSELF HAS OCCURRED TO ME: NEVER
I HAVE FELT SCARED OR PANICKY FOR NO GOOD REASON: YES, SOMETIMES
I HAVE BEEN SO UNHAPPY THAT I HAVE BEEN CRYING: ONLY OCCASIONALLY
THINGS HAVE BEEN GETTING ON TOP OF ME: YES, SOMETIMES I HAVEN'T BEEN COPING AS WELL AS USUAL
I HAVE BEEN ABLE TO LAUGH AND SEE THE FUNNY SIDE OF THINGS: AS MUCH AS I ALWAYS COULD
I HAVE FELT SAD OR MISERABLE: NOT VERY OFTEN
I HAVE BLAMED MYSELF UNNECESSARILY WHEN THINGS WENT WRONG: NO, NEVER
I HAVE LOOKED FORWARD WITH ENJOYMENT TO THINGS: AS MUCH AS I EVER DID
THE THOUGHT OF HARMING MYSELF HAS OCCURRED TO ME: NEVER
I HAVE FELT SAD OR MISERABLE: NOT VERY OFTEN

## 2022-04-26 ASSESSMENT — PATIENT HEALTH QUESTIONNAIRE - PHQ9
SUM OF ALL RESPONSES TO PHQ QUESTIONS 1-9: 0
10. IF YOU CHECKED OFF ANY PROBLEMS, HOW DIFFICULT HAVE THESE PROBLEMS MADE IT FOR YOU TO DO YOUR WORK, TAKE CARE OF THINGS AT HOME, OR GET ALONG WITH OTHER PEOPLE: NOT DIFFICULT AT ALL
SUM OF ALL RESPONSES TO PHQ QUESTIONS 1-9: 0

## 2022-04-26 ASSESSMENT — PAIN SCALES - GENERAL: PAINLEVEL: NO PAIN (0)

## 2022-04-26 NOTE — NURSING NOTE
Chief Complaint   Patient presents with     Postpartum Care     6 week post partum        Medication Reconciliation: complete    Sasha Gustafson LPN........................4/26/2022  1:02 PM

## 2022-04-26 NOTE — PROGRESS NOTES
6 week Postpartum Visit Note    S:  Ms. Christine Moncada is a 42 year old  here for her 6-week postpartum checkup.   - Had a  of an IUFD, T18 on 3/14/22.  - Infant gender:  girl, weight 4 pounds 9.2 oz.   - Bleeding:  Stopped after about 2 weeks. Menses resumed:  Yes   - Bowel/Urinary problems:  No  - Mood: high anxiety. Was okay the first 3 weeks, the last few weeks have been more difficult. Isn't sure if it is her hormones or other life stressors (her  has some medical issues right now). Her children have adjusted fairly well.   - Sleep: okay    - Contraception Planned:  Undecided, was originally planning NuvaRing but now not sure.   - She  has not had intercourse since delivery..    - Current tobacco use:  No  - Hx of Abuse:  No  ================================================================  ROS: 10 point ROS neg other than the symptoms noted above in the HPI.     O:  /60 (BP Location: Right arm, Patient Position: Sitting, Cuff Size: Adult Large)   Pulse 64   Wt 79.8 kg (176 lb)   LMP 06/10/2021 (Within Days)   Breastfeeding No   BMI 29.29 kg/m    Gen: Well-appearing, NAD  Psych:  Appropriate mood and affect  Breast: Deferred, no concerns  Abd:  Benign, Soft, flat, non-tender, No masses, organomegaly and Diastasis less than 1-2 FB  Pelvic: Declined, currently menstruating    Answers for HPI/ROS submitted by the patient on 2022  If you checked off any problems, how difficult have these problems made it for you to do your work, take care of things at home, or get along with other people?: Not difficult at all  PHQ9 TOTAL SCORE: 0  DANIELLE 7 TOTAL SCORE: 6        A/P:  Ms. Christine Moncada is a 42 year old  here for 6 week postpartum visit after  of IUFD. Doing well overall. Discussed karyotype results consistent with 47 XX + 18  - Mood: discussed her anxiety, is hoping once her hormones get back to normal her mood will improve.   - Contraception: undecided, reviewed  options. Can call if she wants an Rx sent for pills/patch/ring  - Follow-up: prn.    Josey Sanchez MD  OB/GYN  4/26/2022 2:13 PM

## 2022-04-27 ASSESSMENT — ANXIETY QUESTIONNAIRES: GAD7 TOTAL SCORE: 6

## 2022-04-27 ASSESSMENT — PATIENT HEALTH QUESTIONNAIRE - PHQ9: SUM OF ALL RESPONSES TO PHQ QUESTIONS 1-9: 0

## 2022-09-04 ENCOUNTER — HEALTH MAINTENANCE LETTER (OUTPATIENT)
Age: 42
End: 2022-09-04

## 2023-01-15 ENCOUNTER — HEALTH MAINTENANCE LETTER (OUTPATIENT)
Age: 43
End: 2023-01-15

## 2023-06-06 ENCOUNTER — OFFICE VISIT (OUTPATIENT)
Dept: FAMILY MEDICINE | Facility: OTHER | Age: 43
End: 2023-06-06
Attending: FAMILY MEDICINE
Payer: COMMERCIAL

## 2023-06-06 VITALS
RESPIRATION RATE: 16 BRPM | TEMPERATURE: 98.9 F | SYSTOLIC BLOOD PRESSURE: 124 MMHG | HEART RATE: 67 BPM | WEIGHT: 166 LBS | OXYGEN SATURATION: 98 % | DIASTOLIC BLOOD PRESSURE: 70 MMHG | BODY MASS INDEX: 27.62 KG/M2

## 2023-06-06 DIAGNOSIS — Z13.0 SCREENING FOR DEFICIENCY ANEMIA: ICD-10-CM

## 2023-06-06 DIAGNOSIS — Z13.29 SCREENING FOR THYROID DISORDER: ICD-10-CM

## 2023-06-06 DIAGNOSIS — Z13.1 SCREENING FOR DIABETES MELLITUS: ICD-10-CM

## 2023-06-06 DIAGNOSIS — Z13.220 SCREENING FOR LIPID DISORDERS: ICD-10-CM

## 2023-06-06 DIAGNOSIS — T69.1XXA CHILBLAINS, INITIAL ENCOUNTER: Primary | ICD-10-CM

## 2023-06-06 DIAGNOSIS — R73.09 IMPAIRED GLUCOSE TOLERANCE TEST: ICD-10-CM

## 2023-06-06 DIAGNOSIS — T78.1XXA GASTROINTESTINAL FOOD SENSITIVITY: ICD-10-CM

## 2023-06-06 LAB
ALBUMIN SERPL BCG-MCNC: 4.5 G/DL (ref 3.5–5.2)
ALP SERPL-CCNC: 59 U/L (ref 35–104)
ALT SERPL W P-5'-P-CCNC: 22 U/L (ref 10–35)
ANION GAP SERPL CALCULATED.3IONS-SCNC: 11 MMOL/L (ref 7–15)
AST SERPL W P-5'-P-CCNC: 25 U/L (ref 10–35)
BASOPHILS # BLD AUTO: 0 10E3/UL (ref 0–0.2)
BASOPHILS NFR BLD AUTO: 1 %
BILIRUB SERPL-MCNC: 0.3 MG/DL
BUN SERPL-MCNC: 23.3 MG/DL (ref 6–20)
CALCIUM SERPL-MCNC: 9.2 MG/DL (ref 8.6–10)
CHLORIDE SERPL-SCNC: 105 MMOL/L (ref 98–107)
CHOLEST SERPL-MCNC: 196 MG/DL
CREAT SERPL-MCNC: 0.91 MG/DL (ref 0.51–0.95)
CRP SERPL-MCNC: <3 MG/L
DEPRECATED HCO3 PLAS-SCNC: 25 MMOL/L (ref 22–29)
EOSINOPHIL # BLD AUTO: 0.1 10E3/UL (ref 0–0.7)
EOSINOPHIL NFR BLD AUTO: 1 %
ERYTHROCYTE [DISTWIDTH] IN BLOOD BY AUTOMATED COUNT: 12.9 % (ref 10–15)
ERYTHROCYTE [SEDIMENTATION RATE] IN BLOOD BY WESTERGREN METHOD: 6 MM/HR (ref 0–20)
GFR SERPL CREATININE-BSD FRML MDRD: 81 ML/MIN/1.73M2
GLUCOSE SERPL-MCNC: 91 MG/DL (ref 70–99)
HBA1C MFR BLD: 5.4 % (ref 4–6.2)
HCT VFR BLD AUTO: 41.8 % (ref 35–47)
HDLC SERPL-MCNC: 77 MG/DL
HGB BLD-MCNC: 13.8 G/DL (ref 11.7–15.7)
IMM GRANULOCYTES # BLD: 0 10E3/UL
IMM GRANULOCYTES NFR BLD: 0 %
LDLC SERPL CALC-MCNC: 107 MG/DL
LYMPHOCYTES # BLD AUTO: 1.6 10E3/UL (ref 0.8–5.3)
LYMPHOCYTES NFR BLD AUTO: 29 %
MCH RBC QN AUTO: 29.2 PG (ref 26.5–33)
MCHC RBC AUTO-ENTMCNC: 33 G/DL (ref 31.5–36.5)
MCV RBC AUTO: 89 FL (ref 78–100)
MONOCYTES # BLD AUTO: 0.5 10E3/UL (ref 0–1.3)
MONOCYTES NFR BLD AUTO: 8 %
NEUTROPHILS # BLD AUTO: 3.5 10E3/UL (ref 1.6–8.3)
NEUTROPHILS NFR BLD AUTO: 61 %
NONHDLC SERPL-MCNC: 119 MG/DL
NRBC # BLD AUTO: 0 10E3/UL
NRBC BLD AUTO-RTO: 0 /100
PLATELET # BLD AUTO: 278 10E3/UL (ref 150–450)
POTASSIUM SERPL-SCNC: 4 MMOL/L (ref 3.4–5.3)
PROT SERPL-MCNC: 7.1 G/DL (ref 6.4–8.3)
RBC # BLD AUTO: 4.72 10E6/UL (ref 3.8–5.2)
SODIUM SERPL-SCNC: 141 MMOL/L (ref 136–145)
TRIGL SERPL-MCNC: 58 MG/DL
TSH SERPL DL<=0.005 MIU/L-ACNC: 0.5 UIU/ML (ref 0.3–4.2)
URATE SERPL-MCNC: 4.5 MG/DL (ref 2.4–5.7)
WBC # BLD AUTO: 5.7 10E3/UL (ref 4–11)

## 2023-06-06 PROCEDURE — 86200 CCP ANTIBODY: CPT | Mod: ZL | Performed by: FAMILY MEDICINE

## 2023-06-06 PROCEDURE — 84443 ASSAY THYROID STIM HORMONE: CPT | Mod: ZL | Performed by: FAMILY MEDICINE

## 2023-06-06 PROCEDURE — 86038 ANTINUCLEAR ANTIBODIES: CPT | Mod: ZL | Performed by: FAMILY MEDICINE

## 2023-06-06 PROCEDURE — 83036 HEMOGLOBIN GLYCOSYLATED A1C: CPT | Mod: ZL | Performed by: FAMILY MEDICINE

## 2023-06-06 PROCEDURE — 85025 COMPLETE CBC W/AUTO DIFF WBC: CPT | Mod: ZL | Performed by: FAMILY MEDICINE

## 2023-06-06 PROCEDURE — 84550 ASSAY OF BLOOD/URIC ACID: CPT | Mod: ZL | Performed by: FAMILY MEDICINE

## 2023-06-06 PROCEDURE — 80061 LIPID PANEL: CPT | Mod: ZL | Performed by: FAMILY MEDICINE

## 2023-06-06 PROCEDURE — 36415 COLL VENOUS BLD VENIPUNCTURE: CPT | Mod: ZL | Performed by: FAMILY MEDICINE

## 2023-06-06 PROCEDURE — 99213 OFFICE O/P EST LOW 20 MIN: CPT | Performed by: FAMILY MEDICINE

## 2023-06-06 PROCEDURE — 86140 C-REACTIVE PROTEIN: CPT | Mod: ZL | Performed by: FAMILY MEDICINE

## 2023-06-06 PROCEDURE — 86618 LYME DISEASE ANTIBODY: CPT | Mod: ZL | Performed by: FAMILY MEDICINE

## 2023-06-06 PROCEDURE — 80053 COMPREHEN METABOLIC PANEL: CPT | Mod: ZL | Performed by: FAMILY MEDICINE

## 2023-06-06 PROCEDURE — 85652 RBC SED RATE AUTOMATED: CPT | Mod: ZL | Performed by: FAMILY MEDICINE

## 2023-06-06 PROCEDURE — 86431 RHEUMATOID FACTOR QUANT: CPT | Mod: ZL | Performed by: FAMILY MEDICINE

## 2023-06-06 ASSESSMENT — PAIN SCALES - GENERAL: PAINLEVEL: NO PAIN (0)

## 2023-06-06 NOTE — PROGRESS NOTES
Nursing Notes:   June Carver LPN  6/6/2023 11:40 AM  Signed  Chief Complaint   Patient presents with     Edema       Medication Reconciliation: complete    June Carver LPN        Subjective   Christine Arevalo is a 43 year old, presenting for the following health issues:  Edema        6/6/2023    11:39 AM   Additional Questions   Roomed by June Carver     Has had ongoing inflammation in her toes on and off for a couple of years.  Doesn't affect the rest of her foot.  The toe will get red and puffy on the tops of the distal part of her toes.  Flares 3-4 times per year and is more prominent, but might be less intense other times.  Gets little better, but will have a line of redness she will see.  Her left foot is worse than her right.  Started about 4 years ago when she was pregnant.  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  She eliminated that from her diet and she had fewer symptoms.  In November she had another flare.  She had been eating more oranges and cashews at that time.  In February, she had another flare causing a lot of pain in her feet and had a rash of perioral dermatitis at that time as well.  Hurt to walk.  Her period was thrown off at that time too.  She is wondering if she has a histamine reaction causing this.  She is already avoiding gluten and very limited dairy.  If she is eating more proteins only, she seems to do better.  There is a functional medicine provider at [facility] in [place] she is interested in getting a referral to see.    History of Present Illness       Reason for visit:  Follow up with inflamation in toes    She eats 4 or more servings of fruits and vegetables daily.She consumes 0 sweetened beverage(s) daily.She exercises with enough effort to increase her heart rate 60 or more minutes per day.  She exercises with enough effort to increase her heart rate 4 days per week.   She is taking medications  regularly.       Edema         Review of Systems   Constitutional, HEENT, cardiovascular, pulmonary, GI, , musculoskeletal, neuro, skin, endocrine and psych systems are negative, except as otherwise noted.      Objective    /70   Pulse 67   Temp 98.9  F (37.2  C) (Tympanic)   Resp 16   Wt 75.3 kg (166 lb)   LMP 05/18/2023 (Exact Date)   SpO2 98%   Breastfeeding No   BMI 27.62 kg/m    Body mass index is 27.62 kg/m .  Physical Exam  Constitutional:       Appearance: Normal appearance.   HENT:      Head: Normocephalic.   Eyes:      Extraocular Movements: Extraocular movements intact.      Pupils: Pupils are equal, round, and reactive to light.   Skin:     Comments: Skin on toes is slightly red, minimally swollen right now.   Neurological:      Mental Status: She is alert.   Psychiatric:         Mood and Affect: Mood normal.         Behavior: Behavior normal.          Assessment & Plan     ICD-10-CM    1. Chilblains, initial encounter  T69.1XXA Integrative Medicine Referral     Anti Nuclear Lissett IgG by IFA with Reflex     Cyclic Citrullinated Peptide Antibody IgG     CRP inflammation     Erythrocyte sedimentation rate auto     Rheumatoid factor     Lyme Disease Total Abs Bld with Reflex to Confirm CLIA     Uric acid     Uric acid     Lyme Disease Total Abs Bld with Reflex to Confirm CLIA     Rheumatoid factor     Erythrocyte sedimentation rate auto     CRP inflammation     Cyclic Citrullinated Peptide Antibody IgG     Anti Nuclear Lissett IgG by IFA with Reflex      2. Gastrointestinal food sensitivity  T78.1XXA Integrative Medicine Referral      3. Impaired glucose tolerance test  R73.09       4. Screening for lipid disorders  Z13.220 Lipid Profile     Lipid Profile      5. Screening for diabetes mellitus  Z13.1 Hemoglobin A1c     Comprehensive metabolic panel     Comprehensive metabolic panel     Hemoglobin A1c      6. Screening for deficiency anemia  Z13.0 CBC with Platelets & Differential     CBC with  Platelets & Differential      7. Screening for thyroid disorder  Z13.29 TSH with free T4 reflex     TSH with free T4 reflex        1.  Inflammation that she gets intermittently in her toes seems consistent with chilblains.  I showed her pictures of what this looks like in she feels this is how her toes look when she has this happen.  Discussed that sometimes this can be a variant of lupus.  She wishes to work further with identifying foods that might be triggers for her and would like to have a referral to a functional medicine provider in [place].  Referral was placed.  Discussed that if she is not making progress with that, she could consider a referral to rheumatology as well to see if they feel that she could have chilblains lupus.  Labs were repeated as above.  2.  See #1.  She definitely notices that strawberries and citrus as well as possibly cashews are triggers for her, but she may have others.  3.  Labs completed as above with her past history of impaired glucose tolerance during pregnancy.  4.  Lipid profile completed as above.  5.  Comprehensive metabolic profile completed as above.  6.  CBC as above.  7.  TSH as above.             Encouraged regular exercise.     No follow-ups on file.    Baylee Lyle MD  [facility] AND [facility]

## 2023-06-06 NOTE — NURSING NOTE
Chief Complaint   Patient presents with     Edema       Medication Reconciliation: complete    June Carver, LPN

## 2023-06-07 LAB
ANA SER QL IF: NEGATIVE
B BURGDOR IGG+IGM SER QL: 0.12
CCP AB SER IA-ACNC: 0.9 U/ML
RHEUMATOID FACT SER NEPH-ACNC: <7 IU/ML

## 2024-02-18 ENCOUNTER — HEALTH MAINTENANCE LETTER (OUTPATIENT)
Age: 44
End: 2024-02-18

## 2024-07-26 ENCOUNTER — OFFICE VISIT (OUTPATIENT)
Dept: FAMILY MEDICINE | Facility: OTHER | Age: 44
End: 2024-07-26
Attending: FAMILY MEDICINE
Payer: COMMERCIAL

## 2024-07-26 VITALS
DIASTOLIC BLOOD PRESSURE: 72 MMHG | BODY MASS INDEX: 27.96 KG/M2 | TEMPERATURE: 98.4 F | HEART RATE: 68 BPM | RESPIRATION RATE: 16 BRPM | OXYGEN SATURATION: 95 % | SYSTOLIC BLOOD PRESSURE: 116 MMHG | WEIGHT: 168 LBS

## 2024-07-26 DIAGNOSIS — T69.1XXA CHILBLAINS, INITIAL ENCOUNTER: Primary | ICD-10-CM

## 2024-07-26 DIAGNOSIS — K63.8219 SMALL INTESTINAL BACTERIAL OVERGROWTH (SIBO): ICD-10-CM

## 2024-07-26 DIAGNOSIS — L71.9 ROSACEA: ICD-10-CM

## 2024-07-26 PROCEDURE — 99214 OFFICE O/P EST MOD 30 MIN: CPT | Performed by: FAMILY MEDICINE

## 2024-07-26 PROCEDURE — G2211 COMPLEX E/M VISIT ADD ON: HCPCS | Performed by: FAMILY MEDICINE

## 2024-07-26 ASSESSMENT — PAIN SCALES - GENERAL: PAINLEVEL: NO PAIN (0)

## 2024-07-26 NOTE — NURSING NOTE
Chief Complaint   Patient presents with    RECHECK         Medication Reconciliation: complete    June Carver, LPN

## 2024-07-26 NOTE — PROGRESS NOTES
Nursing Notes:   June Carver LPN  7/26/2024  9:54 AM  Sign at exiting of workspace  Chief Complaint   Patient presents with    RECHECK         Medication Reconciliation: complete    June Carver LPN        Subjective   Christine Arevalo is a 44 year old, presenting for the following health issues:  RECHECK        7/26/2024     9:53 AM   Additional Questions   Roomed by June Carver     Has been working with integrative health provider and she feels that a lot of her symptoms were getting worse.  She is having chilblains in her feet.  Working on wearing slippers all working and watching diet, which has helped a lot.  If her feet get cold, her toes will start tingling.  Hasn't had any summertime flare ups, like she used to.  Had been having a rash on her chest and left arm, which has gone away.  Was tested for SIBO and was positive.  Just treated with Xifaxin.  It did help a lot.  She took this for a month.  Also has been taking some supplements earlier this year.  She gained back a lot of energy.  Still is avoiding gluten and dairy.  Started trying to introduce sourdough bread.  Seems to tolerate that ok.   Has been able to add back veggies and fermented foods again.  If she eats peanuts, she will get some of her left upper arm rash back.  Still is having some occasional joint swelling.  Just had a bunch of Rheumatologic labs which were all again negative.    History of Present Illness       Reason for visit:  Follow up from last year for swollen toes    She eats 2-3 servings of fruits and vegetables daily.She consumes 0 sweetened beverage(s) daily.She exercises with enough effort to increase her heart rate 30 to 60 minutes per day.  She exercises with enough effort to increase her heart rate 4 days per week.   She is taking medications regularly.         Follow-up swelling and rash         Review of Systems  Constitutional, HEENT, cardiovascular, pulmonary, GI, , musculoskeletal, neuro, skin, endocrine and psych  systems are negative, except as otherwise noted.      Objective    /72   Pulse 68   Temp 98.4  F (36.9  C) (Tympanic)   Resp 16   Wt 76.2 kg (168 lb)   LMP 07/03/2024 (Exact Date)   SpO2 95%   Breastfeeding No   BMI 27.96 kg/m    Body mass index is 27.96 kg/m .  Physical Exam  Constitutional:       Appearance: Normal appearance.   HENT:      Head: Normocephalic.   Eyes:      Extraocular Movements: Extraocular movements intact.      Pupils: Pupils are equal, round, and reactive to light.   Cardiovascular:      Rate and Rhythm: Normal rate and regular rhythm.      Heart sounds: Normal heart sounds. No murmur heard.  Pulmonary:      Effort: Pulmonary effort is normal.      Breath sounds: Normal breath sounds. No wheezing, rhonchi or rales.   Musculoskeletal:      Cervical back: Normal range of motion and neck supple.      Right lower leg: No edema.      Left lower leg: No edema.   Lymphadenopathy:      Cervical: No cervical adenopathy.   Skin:     Comments: Slight rash on cheeks currently.   Neurological:      Mental Status: She is alert.   Psychiatric:         Mood and Affect: Mood normal.         Behavior: Behavior normal.                ICD-10-CM    1. Chilblains, initial encounter  T69.1XXA       2. Small intestinal bacterial overgrowth (SIBO)  K63.8219       3. Rosacea  L71.9 metroNIDAZOLE (METROCREAM) 0.75 % external cream          Her symptoms are much better now that it is summertime.  Tends to have more trouble in the winter.  She has been diligent with trying to keep her feet warm, particularly if she is going to be outside for an extended period of time.  If she continues to have increased symptoms, could consider trial of nifedipine versus referral to rheumatology.  Improved symptoms with treatment with Xifaxan.  She has completed this and is doing much better now.  Able to tolerate some foods that she had not been before.  Follow-up as needed.  She continues to work with integrative medicine  as well.  Metronidazole cream was refilled.    She declined mammogram, hepatitis C screening due to low risk, hepatitis B vaccine.  Encouraged her to make an appointment for physical as she is due for a Pap.    No follow-ups on file.     30 minutes spent in review of chart, interviewing patient, examining patient, discussing plan, reviewing results and completing note on the date of encounter.    The longitudinal plan of care for the diagnosis(es)/condition(s) as documented were addressed during this visit. Due to the added complexity in care, I will continue to support Christine Arevalo in the subsequent management and with ongoing continuity of care.    Baylee Lyle MD

## 2025-02-21 PROBLEM — Z37.9 NORMAL LABOR: Status: RESOLVED | Noted: 2022-03-14 | Resolved: 2025-02-21

## 2025-04-03 ENCOUNTER — OFFICE VISIT (OUTPATIENT)
Dept: FAMILY MEDICINE | Facility: OTHER | Age: 45
End: 2025-04-03
Attending: FAMILY MEDICINE
Payer: COMMERCIAL

## 2025-04-03 ENCOUNTER — HOSPITAL ENCOUNTER (OUTPATIENT)
Dept: GENERAL RADIOLOGY | Facility: OTHER | Age: 45
Discharge: HOME | End: 2025-04-03
Attending: FAMILY MEDICINE
Payer: COMMERCIAL

## 2025-04-03 VITALS
BODY MASS INDEX: 28.02 KG/M2 | SYSTOLIC BLOOD PRESSURE: 128 MMHG | TEMPERATURE: 97.2 F | WEIGHT: 171 LBS | HEART RATE: 72 BPM | DIASTOLIC BLOOD PRESSURE: 86 MMHG | RESPIRATION RATE: 16 BRPM | OXYGEN SATURATION: 99 %

## 2025-04-03 DIAGNOSIS — M75.41 SUBACROMIAL IMPINGEMENT OF RIGHT SHOULDER: Primary | ICD-10-CM

## 2025-04-03 DIAGNOSIS — M67.911 TENDINOPATHY OF RIGHT ROTATOR CUFF: ICD-10-CM

## 2025-04-03 PROCEDURE — 73030 X-RAY EXAM OF SHOULDER: CPT | Mod: RT

## 2025-04-03 ASSESSMENT — PAIN SCALES - GENERAL: PAINLEVEL_OUTOF10: MODERATE PAIN (5)

## 2025-04-03 NOTE — PROGRESS NOTES
"Sports Medicine Office Note    HPI:  45-year-old female coming in for evaluation of right shoulder pain.  She has had pain for 8-9 months.  No inciting event or injury.  4 weeks ago she had a significant flare of her pain where her pain reached a level of 10/10.  Currently she rates her pain at 5/10.  She characterized the pain as sharp and throbbing.  Lifting is particularly bothersome.  She has significant pain when she tries to abduct her arm away from her body.  She has tried heat, ice, rest, stretching, and over-the-counter medications.  She is an avid weightlifter and some weightlifting exercises are particularly bothersome.  She denies radicular symptoms.  She does report an occasional feeling of having to circumduct her arm to get it out of a \"stuck\" position.      EXAM:  /86 (BP Location: Right arm, Patient Position: Sitting, Cuff Size: Adult Regular)   Pulse 72   Temp 97.2  F (36.2  C) (Temporal)   Resp 16   Wt 77.6 kg (171 lb)   LMP 01/30/2025 (Exact Date)   SpO2 99%   BMI 28.02 kg/m    MUSCULOSKELETAL EXAM:  RIGHT SHOULDER  Inspection:  -No gross deformity  -No bruising or swelling  -Scars:  None    Tenderness to palpation of the:  -SC joint:  Negative  -AC joint:   Negative  -Clavicle:   Negative  -Biceps tendon in bicipital groove:   Negative  -Deltoid musculature:   Negative  -Upper trapezius musculature:   Negative    Range of Motion:  -Active flexion:  180 left, 170 right  -Active abduction:  180 left, 30 right  -Passive external rotation in 90 of abduction: Unable to perform secondary to pain  -Passive internal rotation in 90 of abduction: Unable to perform secondary to pain    Strength:  -Supraspinatus:  5/5  -Infraspinatus:  5/5  -Subscapularis:  5/5  -Deltoid:  5/5    Special Tests:  -Logan test: Mild pain without weakness  -Bonds test:   Positive  -Neer test:   Positive  -Mid-arc pain: Present  -Speeds test:   Negative  -O Saul active compression test:   Positive  -Crossarm " adduction test:   Negative  -Apprehension test:   Unable to perform  -Yergason test:   Negative  -Lag sign:   Negative  -Anterior drawer:  Short of the rim  -Posterior drawer:  Short of the rim    Other:  -Intact sensation to light touch distally.  -No signs of cyanosis. Normal skin temperature of the upper extremity.  -Elbow:  No gross deformity. Full range of motion.  -Hand/wrist:  No gross deformity. Full range of motion.  -Left shoulder:  No gross deformity. No palpable tenderness. Normal strength.      IMAGIN/3/2025: 3 view right shoulder x-ray  - No fracture, dislocation, or bony lesion      ASSESSMENT/PLAN:  Diagnoses and all orders for this visit:  Subacromial impingement of right shoulder  -     XR Shoulder Right G/E 3 Views  -     Orthopedic  Referral  -     Physical Therapy  Referral; Future  Tendinopathy of right rotator cuff  -     Physical Therapy  Referral; Future    45-year-old female with what appears to be subacromial impingement.  She could have a potential underlying component of a labral tear.  No worrisome findings to suggest instability or significant rotator cuff pathology.  X-rays were performed in the office today and personally reviewed by me with the findings as demonstrated above by my interpretation.  We reviewed treatment options to include physical therapy, anti-inflammatories, injection, or proceeding with an MRI.  At this time we will start with less invasive interventions and advance as symptoms necessitate  - Referral placed for physical therapy  - If symptoms or not improving over the coming weeks, recommend follow-up evaluation and consideration of CSI  - If there is a desire to consider surgical management, would proceed with an MR arthrogram      Sean Anne MD  4/3/2025  2:13 PM    Total time spent with this patient was 38 minutes which included chart review, visualization and independent interpretation of images, time spent with the patient,  and documentation.    Procedure time:  0 minute(s)

## 2025-04-30 ENCOUNTER — TRANSFERRED RECORDS (OUTPATIENT)
Dept: HEALTH INFORMATION MANAGEMENT | Facility: OTHER | Age: 45
End: 2025-04-30
Payer: COMMERCIAL

## 2025-07-11 ENCOUNTER — TRANSFERRED RECORDS (OUTPATIENT)
Dept: HEALTH INFORMATION MANAGEMENT | Facility: OTHER | Age: 45
End: 2025-07-11
Payer: COMMERCIAL

## 2025-08-13 ENCOUNTER — TRANSFERRED RECORDS (OUTPATIENT)
Dept: HEALTH INFORMATION MANAGEMENT | Facility: OTHER | Age: 45
End: 2025-08-13
Payer: COMMERCIAL

## (undated) RX ORDER — SODIUM CHLORIDE, SODIUM LACTATE, POTASSIUM CHLORIDE, CALCIUM CHLORIDE 600; 310; 30; 20 MG/100ML; MG/100ML; MG/100ML; MG/100ML
INJECTION, SOLUTION INTRAVENOUS
Status: DISPENSED
Start: 2022-03-14